# Patient Record
Sex: MALE | Race: WHITE | Employment: FULL TIME | URBAN - METROPOLITAN AREA
[De-identification: names, ages, dates, MRNs, and addresses within clinical notes are randomized per-mention and may not be internally consistent; named-entity substitution may affect disease eponyms.]

---

## 2019-08-19 ENCOUNTER — HOSPITAL ENCOUNTER (INPATIENT)
Facility: CLINIC | Age: 54
LOS: 3 days | Discharge: HOME | DRG: 392 | End: 2019-08-22
Attending: EMERGENCY MEDICINE | Admitting: INTERNAL MEDICINE
Payer: COMMERCIAL

## 2019-08-19 ENCOUNTER — APPOINTMENT (OUTPATIENT)
Dept: CT IMAGING | Facility: CLINIC | Age: 54
DRG: 392 | End: 2019-08-19
Attending: EMERGENCY MEDICINE
Payer: COMMERCIAL

## 2019-08-19 DIAGNOSIS — K57.20 PERFORATION OF SIGMOID COLON DUE TO DIVERTICULITIS: ICD-10-CM

## 2019-08-19 PROBLEM — K57.92 DIVERTICULITIS: Status: ACTIVE | Noted: 2019-08-19

## 2019-08-19 LAB
ALBUMIN SERPL-MCNC: 4.2 G/DL (ref 3.4–5)
ALBUMIN UR-MCNC: 10 MG/DL
ALP SERPL-CCNC: 90 U/L (ref 40–150)
ALT SERPL W P-5'-P-CCNC: 52 U/L (ref 0–70)
ANION GAP SERPL CALCULATED.3IONS-SCNC: 7 MMOL/L (ref 3–14)
APPEARANCE UR: CLEAR
AST SERPL W P-5'-P-CCNC: 28 U/L (ref 0–45)
BASOPHILS # BLD AUTO: 0 10E9/L (ref 0–0.2)
BASOPHILS NFR BLD AUTO: 0.2 %
BILIRUB DIRECT SERPL-MCNC: 0.2 MG/DL (ref 0–0.2)
BILIRUB SERPL-MCNC: 0.8 MG/DL (ref 0.2–1.3)
BILIRUB UR QL STRIP: NEGATIVE
BUN SERPL-MCNC: 14 MG/DL (ref 7–30)
CALCIUM SERPL-MCNC: 9.5 MG/DL (ref 8.5–10.1)
CHLORIDE SERPL-SCNC: 106 MMOL/L (ref 94–109)
CO2 SERPL-SCNC: 22 MMOL/L (ref 20–32)
COLOR UR AUTO: YELLOW
CREAT SERPL-MCNC: 0.93 MG/DL (ref 0.66–1.25)
DIFFERENTIAL METHOD BLD: ABNORMAL
EOSINOPHIL # BLD AUTO: 0.1 10E9/L (ref 0–0.7)
EOSINOPHIL NFR BLD AUTO: 0.4 %
ERYTHROCYTE [DISTWIDTH] IN BLOOD BY AUTOMATED COUNT: 12.5 % (ref 10–15)
GFR SERPL CREATININE-BSD FRML MDRD: >90 ML/MIN/{1.73_M2}
GLUCOSE SERPL-MCNC: 137 MG/DL (ref 70–99)
GLUCOSE UR STRIP-MCNC: NEGATIVE MG/DL
HCT VFR BLD AUTO: 45.5 % (ref 40–53)
HGB BLD-MCNC: 15.6 G/DL (ref 13.3–17.7)
HGB UR QL STRIP: NEGATIVE
IMM GRANULOCYTES # BLD: 0.1 10E9/L (ref 0–0.4)
IMM GRANULOCYTES NFR BLD: 0.7 %
KETONES UR STRIP-MCNC: NEGATIVE MG/DL
LACTATE BLD-SCNC: 1.2 MMOL/L (ref 0.7–2)
LEUKOCYTE ESTERASE UR QL STRIP: NEGATIVE
LYMPHOCYTES # BLD AUTO: 0.8 10E9/L (ref 0.8–5.3)
LYMPHOCYTES NFR BLD AUTO: 6.8 %
MAGNESIUM SERPL-MCNC: 1.7 MG/DL (ref 1.6–2.3)
MCH RBC QN AUTO: 30.4 PG (ref 26.5–33)
MCHC RBC AUTO-ENTMCNC: 34.3 G/DL (ref 31.5–36.5)
MCV RBC AUTO: 89 FL (ref 78–100)
MONOCYTES # BLD AUTO: 0.8 10E9/L (ref 0–1.3)
MONOCYTES NFR BLD AUTO: 7.1 %
MUCOUS THREADS #/AREA URNS LPF: PRESENT /LPF
NEUTROPHILS # BLD AUTO: 9.7 10E9/L (ref 1.6–8.3)
NEUTROPHILS NFR BLD AUTO: 84.8 %
NITRATE UR QL: NEGATIVE
NRBC # BLD AUTO: 0 10*3/UL
NRBC BLD AUTO-RTO: 0 /100
PH UR STRIP: 5.5 PH (ref 5–7)
PLATELET # BLD AUTO: 267 10E9/L (ref 150–450)
POTASSIUM SERPL-SCNC: 4.3 MMOL/L (ref 3.4–5.3)
PROT SERPL-MCNC: 7.9 G/DL (ref 6.8–8.8)
RBC # BLD AUTO: 5.13 10E12/L (ref 4.4–5.9)
RBC #/AREA URNS AUTO: 1 /HPF (ref 0–2)
SODIUM SERPL-SCNC: 135 MMOL/L (ref 133–144)
SOURCE: ABNORMAL
SP GR UR STRIP: 1.03 (ref 1–1.03)
UROBILINOGEN UR STRIP-MCNC: NORMAL MG/DL (ref 0–2)
WBC # BLD AUTO: 11.4 10E9/L (ref 4–11)
WBC #/AREA URNS AUTO: 1 /HPF (ref 0–5)

## 2019-08-19 PROCEDURE — 96365 THER/PROPH/DIAG IV INF INIT: CPT | Mod: 59

## 2019-08-19 PROCEDURE — 25000128 H RX IP 250 OP 636: Performed by: INTERNAL MEDICINE

## 2019-08-19 PROCEDURE — 25000128 H RX IP 250 OP 636: Performed by: EMERGENCY MEDICINE

## 2019-08-19 PROCEDURE — 83605 ASSAY OF LACTIC ACID: CPT | Performed by: INTERNAL MEDICINE

## 2019-08-19 PROCEDURE — 12000000 ZZH R&B MED SURG/OB

## 2019-08-19 PROCEDURE — 85025 COMPLETE CBC W/AUTO DIFF WBC: CPT | Performed by: EMERGENCY MEDICINE

## 2019-08-19 PROCEDURE — 80048 BASIC METABOLIC PNL TOTAL CA: CPT | Performed by: EMERGENCY MEDICINE

## 2019-08-19 PROCEDURE — 25000125 ZZHC RX 250: Performed by: EMERGENCY MEDICINE

## 2019-08-19 PROCEDURE — 96361 HYDRATE IV INFUSION ADD-ON: CPT

## 2019-08-19 PROCEDURE — 99223 1ST HOSP IP/OBS HIGH 75: CPT | Mod: AI | Performed by: INTERNAL MEDICINE

## 2019-08-19 PROCEDURE — 99221 1ST HOSP IP/OBS SF/LOW 40: CPT | Performed by: SURGERY

## 2019-08-19 PROCEDURE — 99285 EMERGENCY DEPT VISIT HI MDM: CPT | Mod: 25

## 2019-08-19 PROCEDURE — 83735 ASSAY OF MAGNESIUM: CPT | Performed by: EMERGENCY MEDICINE

## 2019-08-19 PROCEDURE — 96376 TX/PRO/DX INJ SAME DRUG ADON: CPT

## 2019-08-19 PROCEDURE — 96375 TX/PRO/DX INJ NEW DRUG ADDON: CPT

## 2019-08-19 PROCEDURE — 80076 HEPATIC FUNCTION PANEL: CPT | Performed by: EMERGENCY MEDICINE

## 2019-08-19 PROCEDURE — 74177 CT ABD & PELVIS W/CONTRAST: CPT

## 2019-08-19 PROCEDURE — 81001 URINALYSIS AUTO W/SCOPE: CPT | Performed by: EMERGENCY MEDICINE

## 2019-08-19 PROCEDURE — 25800025 ZZH RX 258: Performed by: INTERNAL MEDICINE

## 2019-08-19 PROCEDURE — 36415 COLL VENOUS BLD VENIPUNCTURE: CPT | Performed by: INTERNAL MEDICINE

## 2019-08-19 PROCEDURE — 25000132 ZZH RX MED GY IP 250 OP 250 PS 637: Performed by: INTERNAL MEDICINE

## 2019-08-19 RX ORDER — ONDANSETRON 2 MG/ML
4 INJECTION INTRAMUSCULAR; INTRAVENOUS
Status: COMPLETED | OUTPATIENT
Start: 2019-08-19 | End: 2019-08-19

## 2019-08-19 RX ORDER — HYDROMORPHONE HYDROCHLORIDE 1 MG/ML
.3-.5 INJECTION, SOLUTION INTRAMUSCULAR; INTRAVENOUS; SUBCUTANEOUS
Status: DISCONTINUED | OUTPATIENT
Start: 2019-08-19 | End: 2019-08-22 | Stop reason: HOSPADM

## 2019-08-19 RX ORDER — PANTOPRAZOLE SODIUM 20 MG/1
20 TABLET, DELAYED RELEASE ORAL
Status: DISCONTINUED | OUTPATIENT
Start: 2019-08-20 | End: 2019-08-22 | Stop reason: HOSPADM

## 2019-08-19 RX ORDER — PROCHLORPERAZINE MALEATE 10 MG
10 TABLET ORAL EVERY 6 HOURS PRN
Status: DISCONTINUED | OUTPATIENT
Start: 2019-08-19 | End: 2019-08-22 | Stop reason: HOSPADM

## 2019-08-19 RX ORDER — ONDANSETRON 4 MG/1
4 TABLET, ORALLY DISINTEGRATING ORAL EVERY 6 HOURS PRN
Status: DISCONTINUED | OUTPATIENT
Start: 2019-08-19 | End: 2019-08-22 | Stop reason: HOSPADM

## 2019-08-19 RX ORDER — ONDANSETRON 2 MG/ML
4 INJECTION INTRAMUSCULAR; INTRAVENOUS EVERY 6 HOURS PRN
Status: DISCONTINUED | OUTPATIENT
Start: 2019-08-19 | End: 2019-08-22 | Stop reason: HOSPADM

## 2019-08-19 RX ORDER — LIDOCAINE 40 MG/G
CREAM TOPICAL
Status: DISCONTINUED | OUTPATIENT
Start: 2019-08-19 | End: 2019-08-22 | Stop reason: HOSPADM

## 2019-08-19 RX ORDER — POTASSIUM CHLORIDE 1500 MG/1
20-40 TABLET, EXTENDED RELEASE ORAL
Status: DISCONTINUED | OUTPATIENT
Start: 2019-08-19 | End: 2019-08-22 | Stop reason: HOSPADM

## 2019-08-19 RX ORDER — ERTAPENEM 1 G/1
1 INJECTION, POWDER, LYOPHILIZED, FOR SOLUTION INTRAMUSCULAR; INTRAVENOUS ONCE
Status: COMPLETED | OUTPATIENT
Start: 2019-08-19 | End: 2019-08-19

## 2019-08-19 RX ORDER — IOPAMIDOL 755 MG/ML
500 INJECTION, SOLUTION INTRAVASCULAR ONCE
Status: COMPLETED | OUTPATIENT
Start: 2019-08-19 | End: 2019-08-19

## 2019-08-19 RX ORDER — DEXTROSE MONOHYDRATE, SODIUM CHLORIDE, AND POTASSIUM CHLORIDE 50; 1.49; 9 G/1000ML; G/1000ML; G/1000ML
INJECTION, SOLUTION INTRAVENOUS CONTINUOUS
Status: DISCONTINUED | OUTPATIENT
Start: 2019-08-19 | End: 2019-08-21

## 2019-08-19 RX ORDER — POTASSIUM CL/LIDO/0.9 % NACL 10MEQ/0.1L
10 INTRAVENOUS SOLUTION, PIGGYBACK (ML) INTRAVENOUS
Status: DISCONTINUED | OUTPATIENT
Start: 2019-08-19 | End: 2019-08-22 | Stop reason: HOSPADM

## 2019-08-19 RX ORDER — PROCHLORPERAZINE 25 MG
25 SUPPOSITORY, RECTAL RECTAL EVERY 12 HOURS PRN
Status: DISCONTINUED | OUTPATIENT
Start: 2019-08-19 | End: 2019-08-22 | Stop reason: HOSPADM

## 2019-08-19 RX ORDER — NALOXONE HYDROCHLORIDE 0.4 MG/ML
.1-.4 INJECTION, SOLUTION INTRAMUSCULAR; INTRAVENOUS; SUBCUTANEOUS
Status: DISCONTINUED | OUTPATIENT
Start: 2019-08-19 | End: 2019-08-22 | Stop reason: HOSPADM

## 2019-08-19 RX ORDER — POTASSIUM CHLORIDE 7.45 MG/ML
10 INJECTION INTRAVENOUS
Status: DISCONTINUED | OUTPATIENT
Start: 2019-08-19 | End: 2019-08-22 | Stop reason: HOSPADM

## 2019-08-19 RX ORDER — MAGNESIUM SULFATE HEPTAHYDRATE 40 MG/ML
4 INJECTION, SOLUTION INTRAVENOUS EVERY 4 HOURS PRN
Status: DISCONTINUED | OUTPATIENT
Start: 2019-08-19 | End: 2019-08-22 | Stop reason: HOSPADM

## 2019-08-19 RX ORDER — LORAZEPAM 1 MG/1
1 TABLET ORAL EVERY 6 HOURS PRN
COMMUNITY
End: 2020-01-02

## 2019-08-19 RX ORDER — POTASSIUM CHLORIDE 1.5 G/1.58G
20-40 POWDER, FOR SOLUTION ORAL
Status: DISCONTINUED | OUTPATIENT
Start: 2019-08-19 | End: 2019-08-22 | Stop reason: HOSPADM

## 2019-08-19 RX ORDER — LORAZEPAM 1 MG/1
1 TABLET ORAL EVERY 6 HOURS PRN
Status: DISCONTINUED | OUTPATIENT
Start: 2019-08-19 | End: 2019-08-22 | Stop reason: HOSPADM

## 2019-08-19 RX ORDER — OXYCODONE HYDROCHLORIDE 5 MG/1
5-10 TABLET ORAL EVERY 4 HOURS PRN
Status: DISCONTINUED | OUTPATIENT
Start: 2019-08-19 | End: 2019-08-22 | Stop reason: HOSPADM

## 2019-08-19 RX ORDER — ACETAMINOPHEN 325 MG/1
650 TABLET ORAL EVERY 4 HOURS PRN
Status: DISCONTINUED | OUTPATIENT
Start: 2019-08-19 | End: 2019-08-22 | Stop reason: HOSPADM

## 2019-08-19 RX ORDER — ACETAMINOPHEN 500 MG
1000 TABLET ORAL EVERY 6 HOURS PRN
COMMUNITY

## 2019-08-19 RX ORDER — MORPHINE SULFATE 4 MG/ML
4 INJECTION, SOLUTION INTRAMUSCULAR; INTRAVENOUS
Status: COMPLETED | OUTPATIENT
Start: 2019-08-19 | End: 2019-08-19

## 2019-08-19 RX ORDER — ERTAPENEM 1 G/1
1 INJECTION, POWDER, LYOPHILIZED, FOR SOLUTION INTRAMUSCULAR; INTRAVENOUS EVERY 24 HOURS
Status: DISCONTINUED | OUTPATIENT
Start: 2019-08-20 | End: 2019-08-19

## 2019-08-19 RX ORDER — POTASSIUM CHLORIDE 29.8 MG/ML
20 INJECTION INTRAVENOUS
Status: DISCONTINUED | OUTPATIENT
Start: 2019-08-19 | End: 2019-08-22 | Stop reason: HOSPADM

## 2019-08-19 RX ADMIN — HYDROMORPHONE HYDROCHLORIDE 0.5 MG: 1 INJECTION, SOLUTION INTRAMUSCULAR; INTRAVENOUS; SUBCUTANEOUS at 18:33

## 2019-08-19 RX ADMIN — ERTAPENEM SODIUM 1 G: 1 INJECTION, POWDER, LYOPHILIZED, FOR SOLUTION INTRAMUSCULAR; INTRAVENOUS at 10:51

## 2019-08-19 RX ADMIN — ACETAMINOPHEN 650 MG: 325 TABLET, FILM COATED ORAL at 19:58

## 2019-08-19 RX ADMIN — ONDANSETRON 4 MG: 2 INJECTION INTRAMUSCULAR; INTRAVENOUS at 08:04

## 2019-08-19 RX ADMIN — SODIUM CHLORIDE 1000 ML: 9 INJECTION, SOLUTION INTRAVENOUS at 08:02

## 2019-08-19 RX ADMIN — POTASSIUM CHLORIDE, DEXTROSE MONOHYDRATE AND SODIUM CHLORIDE: 150; 5; 900 INJECTION, SOLUTION INTRAVENOUS at 13:35

## 2019-08-19 RX ADMIN — MORPHINE SULFATE 4 MG: 4 INJECTION INTRAVENOUS at 09:49

## 2019-08-19 RX ADMIN — HYDROMORPHONE HYDROCHLORIDE 0.5 MG: 1 INJECTION, SOLUTION INTRAMUSCULAR; INTRAVENOUS; SUBCUTANEOUS at 15:36

## 2019-08-19 RX ADMIN — MORPHINE SULFATE 4 MG: 4 INJECTION INTRAVENOUS at 11:34

## 2019-08-19 RX ADMIN — RANITIDINE 150 MG: 150 TABLET ORAL at 21:11

## 2019-08-19 RX ADMIN — SODIUM CHLORIDE 61 ML: 9 INJECTION, SOLUTION INTRAVENOUS at 09:15

## 2019-08-19 RX ADMIN — MORPHINE SULFATE 4 MG: 4 INJECTION INTRAVENOUS at 08:08

## 2019-08-19 RX ADMIN — TAZOBACTAM SODIUM AND PIPERACILLIN SODIUM 3.38 G: 375; 3 INJECTION, SOLUTION INTRAVENOUS at 19:57

## 2019-08-19 RX ADMIN — HYDROMORPHONE HYDROCHLORIDE 0.5 MG: 1 INJECTION, SOLUTION INTRAMUSCULAR; INTRAVENOUS; SUBCUTANEOUS at 21:11

## 2019-08-19 RX ADMIN — HYDROMORPHONE HYDROCHLORIDE 0.3 MG: 1 INJECTION, SOLUTION INTRAMUSCULAR; INTRAVENOUS; SUBCUTANEOUS at 13:35

## 2019-08-19 RX ADMIN — IOPAMIDOL 84 ML: 755 INJECTION, SOLUTION INTRAVENOUS at 09:15

## 2019-08-19 ASSESSMENT — ACTIVITIES OF DAILY LIVING (ADL)
ADLS_ACUITY_SCORE: 11
ADLS_ACUITY_SCORE: 11

## 2019-08-19 ASSESSMENT — ENCOUNTER SYMPTOMS
ABDOMINAL PAIN: 1
DIAPHORESIS: 1
VOMITING: 1
CHILLS: 1

## 2019-08-19 ASSESSMENT — MIFFLIN-ST. JEOR
SCORE: 1553.63
SCORE: 1562.02

## 2019-08-19 NOTE — PHARMACY-ADMISSION MEDICATION HISTORY
Admission medication history interview status for this patient is complete. See [facility] admission navigator for allergy information, prior to admission medications and immunization status.     Medication history interview source(s):Patient  Medication history resources (including written lists, pill bottles, clinic record):None  Primary pharmacy:[facility] in [place]    Changes made to PTA medication list:  Added: all  Deleted: none  Changed: none    Actions taken by pharmacist (provider contacted, etc):None     Additional medication history information:  Note that patient has been taking SPIRAXIN from [place] 200 mg each tablet  Note that patient had Rx for Lexapro 20 mg qam and Lisinopril 10 mg daily written on 6-4-2019: patient has not taken either of these for over 1 month    Medication reconciliation/reorder completed by provider prior to medication history? No    Do you take OTC medications (eg tylenol, ibuprofen, fish oil, eye/ear drops, etc)? See list    For patients on insulin therapy:No    Prior to Admission medications    Medication Sig Last Dose Taking? Auth Provider   acetaminophen (TYLENOL) 500 MG tablet Take 1,000 mg by mouth every 6 hours as needed for mild pain 8/18/2019 at 2300 Yes Unknown, Entered By History   esomeprazole (NEXIUM) 20 MG DR capsule Take 20 mg by mouth every morning (before breakfast) Take 30-60 minutes before eating. 8/18/2019 at Unknown time Yes Unknown, Entered By History   LORazepam (ATIVAN) 1 MG tablet Take 1 mg by mouth every 6 hours as needed for anxiety 8/17/2019 at pm Yes Unknown, Entered By History   rifaximin (XIFAXAN) 200 MG tablet Take 200 mg by mouth 4 times daily BRAND is SPIRAXIN (medication for [place]) 8/18/2019 at 2300 Yes Unknown, Entered By History

## 2019-08-19 NOTE — ED TRIAGE NOTES
Pt has abdominal pain for past 5 days.  He reports hx of diverticulitis and is on Spiraxin a Rx from [place].  Also has Rifaximinia on the box.

## 2019-08-19 NOTE — PLAN OF CARE
Ambulatory Status:  Pt up standby.  Orientation: a/o  VS:  Tmax 99.0  Pain:  abd-IV dilaudid given   Resp: LS clear.   GI:  denies nausea.  NPO with ice chips diet. Faint/Hypo BS.  Denies passing flatus.  Last BM 8/19.  :  voiding without difficulty   Tx:  Zosyn   Consults:  Surgery  Disposition:  tbd   Pt admitted to the floor at 1200 from ER.

## 2019-08-19 NOTE — ED NOTES
[facility]  ED Nurse Handoff Report    Jim Mixon is a 54 year old male   ED Chief complaint: Abdominal Pain  . ED Diagnosis:   Final diagnoses:   Perforation of sigmoid colon due to diverticulitis     Allergies:   Allergies   Allergen Reactions     Ciprofloxacin        Code Status: Full Code  Activity level - Baseline/Home:  Independent. Activity Level - Current:   Independent. Lift room needed: No. Bariatric: No   Needed: Yes. Pt understands and speaks english well. Does need help with some medical terminology. Pt would like  for significant other  Isolation: No. Infection: Not Applicable.     Vital Signs:   Vitals:    08/19/19 0940 08/19/19 0949 08/19/19 0950 08/19/19 1000   BP:    128/89   Pulse:    100   Resp:  20  16   Temp:       TempSrc:       SpO2: 92%  94% 90%   Weight:       Height:           Cardiac Rhythm:  ,      Pain level: 0-10 Pain Scale: 1  Patient confused: No. Patient Falls Risk: No.   Elimination Status: Has voided   Patient Report - Initial Complaint: abd pain . Focused Assessment: left lower quad/pelvis pain  Tests Performed: labs, CT. Abnormal Results:   Labs Ordered and Resulted from Time of ED Arrival Up to the Time of Departure from the ED   CBC WITH PLATELETS DIFFERENTIAL - Abnormal; Notable for the following components:       Result Value    WBC 11.4 (*)     Absolute Neutrophil 9.7 (*)     All other components within normal limits   BASIC METABOLIC PANEL - Abnormal; Notable for the following components:    Glucose 137 (*)     All other components within normal limits   ROUTINE UA WITH MICROSCOPIC - Abnormal; Notable for the following components:    Protein Albumin Urine 10 (*)     Mucous Urine Present (*)     All other components within normal limits   PERIPHERAL IV CATHETER     CT Abdomen Pelvis w Contrast   Final Result   IMPRESSION: Extensive sigmoid diverticulitis. There is contained fluid   within the wall of the sigmoid as well as  immediately adjacent to the   bowel suspicious for abscess formation.       NARESH ANGUIANO MD         Treatments provided: morphine, invanz, ivf,   Family Comments: at the bedside.   OBS brochure/video discussed/provided to patient:  N/A  ED Medications:   Medications   morphine (PF) injection 4 mg (4 mg Intravenous Given 8/19/19 0949)   ertapenem (INVanz) 1 g vial to attach to  mL bag (1 g Intravenous New Bag 8/19/19 1051)   0.9% sodium chloride BOLUS (0 mLs Intravenous Stopped 8/19/19 0929)   ondansetron (ZOFRAN) injection 4 mg (4 mg Intravenous Given 8/19/19 0804)   CT Scan Flush (61 mLs Intravenous Given 8/19/19 0915)   iopamidol (ISOVUE-370) solution 500 mL (84 mLs Intravenous Given 8/19/19 0915)     Drips infusing:  No  For the majority of the shift, the patient's behavior Green. Interventions performed were axb, pain meds and ivf.     Severe Sepsis OR Septic Shock Diagnosis Present: No      ED Nurse Name/Phone Number: Karl LIV Marley,   11:00 AM    RECEIVING UNIT ED HANDOFF REVIEW    Above ED Nurse Handoff Report was reviewed: Yes  Reviewed by: Cesilia Nichole on August 19, 2019 at 11:37 AM

## 2019-08-19 NOTE — CONSULTS
"[facility]  [facility] - H&P     Jim Mixon MRN# [id number]   Age: 54 year old YOB: 1965     HPI:  Jim Mixon is a 54 year old male who has been experiencing acute RLQ and LLQ abdominal pain for the past 3-4 days associated with chills, nausea, vomiting, loose stools and anorexia.  These symptoms have been increasing in severity since Friday starting despite him having taken antibiotics (Spiraxin)  which he has with him from [place] for diverticulitis.      He was diagnosed with diverticulosis 5 years ago on a routine colonoscopy.  No polyps found, no family history of colon cancer.  Since that time, he has had a least two bouts of diverticulitis treated as an outpatient.  This is his first hospitalization for treatment and seems worse than his other bouts.     History is obtained from the patient and his wife.    Review Of Systems:  Respiratory: No shortness of breath, dyspnea on exertion, cough, or hemoptysis  Cardiovascular: negative  Gastrointestinal: as above  Genitourinary: negative    PMH:  Past Medical History:   Diagnosis Date     Anxiety      Diverticulitis      GERD (gastroesophageal reflux disease)      Hypertension        PSH:  Past Surgical History:   Procedure Laterality Date     CHOLECYSTECTOMY  01/2004       Allergies:  Allergies   Allergen Reactions     Ciprofloxacin        Home Medications:  No current outpatient medications on file.       Social History:  Social History     Tobacco Use     Smoking status: Not on file   Substance Use Topics     Alcohol use: Not on file     Drug use: Not on file       Family History:  No family history on file.    Objective:  /67 (BP Location: Right arm)   Pulse 95   Temp 99  F (37.2  C) (Oral)   Resp 16   Ht 1.702 m (5' 7\")   Wt 76.8 kg (169 lb 6.4 oz)   SpO2 98%   BMI 26.53 kg/m      General appearance: healthy, alert and mild distress.  Hydration: well hydrated  HEENT: normocephalic, " atraumatic  Neck: no adenopathy  Lungs: normal and no respiratory distress  Heart: regular rate and rhythm  Abdomen: rounded, protuberant and symmetric, hypoactive bowel sounds. Tenderness: present: LLQ moderate and involuntary guarding.  Masses: none.  Organomegaly: none  Rectal: deferred  Skin: clear without rashes/lesions  Extremities: no gross deformities  Neuro: oriented to time & place, moves all extremities with normal strength, speech clear    Labs Reviewed:  Recent Labs     08/19/19  0756   HGB 15.6   WBC 11.4*       Radiology:  All imaging studies reviewed by me.  CT scan of the abdomen:   Colon: Extensive sigmoid diverticulitis. There is contained fluid  within the wall of the sigmoid as well as immediately adjacent to the  bowel suspicious for abscess formation.   No free air.  CT scan interpreted by radiologist         ASSESSMENT/PLAN:  Jim has complicated diverticulitis with a possible early abscess.  There is nothing to drain at this time and no free air.  I would keep him NPO except ice chips and aggressively treat with antibiotics.  I think he should be considered for elective surgery once this episode resolves, to avoid the chance of a perforation and a two stage operation.  He is high risk for this due to the extent of his disease and his young age.      If he worsens, would repeat imaging to see if he has free air or evolving abscess which is amenable to drainage.  Will follow along with you.      Yi Jacinto MD

## 2019-08-19 NOTE — PLAN OF CARE
Ambulatory Status:  Pt up SBA.  Orientation: a/o  VS:  Tmax 101.1, HR tachy  Pain:  abd-IV dilaudid given   Resp: LS clear.   GI: No c/o nausea.  NPO with ice chips. Faint/Hypo BS. +passing flatus.  Last BM 8/19.  :  voiding without difficulty   Tx:  Zosyn   Consults:  Surgery  Disposition:  tbd   Pt admitted to the floor at 1200 from ER

## 2019-08-19 NOTE — ED PROVIDER NOTES
History     Chief Complaint:  Abdominal Pain    HPI   Jim Mixon is a 54 year old male with a history of diverticulitis and cholecystectomy who presents with abdominal pain. The patient describes how he developed left lower abdominal pain 4-5 days ago. The following day, he says he began to take his antibiotic Spiraxin (perscribed in [place]) and Tylenol, with improvement of symptoms. However, early this morning he began to feel intermittent abdominal pain again at 0100. This pain was accompanied by vomiting and cold sweats and increased until ED arrival. The patient denies other medication use beyond Nexium. Of note, the patient was diagnosed with diverticulitis in [place] five years ago and has previously had a flare up while in the [place] requiring ED treatment. A  was present for the benefit of the patient's wife.     Allergies:  Ciprofloxacin     Medications:    Ativan  Nexium  Lexapro  Lisinopril  Nexium  Spiraxin - day 3    Past Medical History:    Anxiety   Diverticulitis  GERD  Hypertension   Depression    Past Surgical History:    Cholecystectomy    Family History:    Father: diabetes    Social History:  The patient was accompanied to the ED by his wife.  Smoking Status: Never Smoker  Smokeless Tobacco: Never Used  Alcohol Use: Negative  PCP: Alex Cherry  Marital Status:        Review of Systems   Constitutional: Positive for chills (Cold sweats) and diaphoresis.   Gastrointestinal: Positive for abdominal pain and vomiting.   All other systems reviewed and are negative.    Physical Exam   First Vitals:  Patient Vitals for the past 24 hrs:   BP Temp Temp src Pulse Resp SpO2 Height Weight   08/19/19 1100  141/90 -- -- 105 -- 95 % -- --   08/19/19 1000 128/89 -- -- 100 16 90 % -- --   08/19/19 0950 -- -- -- -- -- 94 % -- --   08/19/19 0949 -- -- -- -- 20 -- -- --   08/19/19 0940 -- -- -- -- -- 92 % -- --   08/19/19 0930  146/88 -- -- 98 -- 96 % -- --  "  08/19/19 0920 -- -- -- -- -- 97 % -- --   08/19/19 0900  140/78 -- -- 95 -- 95 % -- --   08/19/19 0734  165/110 98  F (36.7  C) Temporal 103 20 96 % 1.702 m (5' 7\") 76 kg (167 lb 8.8 oz)     Physical Exam  Nursing note and vitals reviewed.  Constitutional: Cooperative. Uncomfortable appearing.   HENT:   Mouth/Throat: Mucous membranes are normal.   Cardiovascular: Normal rate, regular rhythm and normal heart sounds.  No murmur.  Pulmonary/Chest: Effort normal and breath sounds normal. No respiratory distress. No wheezes. No rales.   Abdominal: Soft. Normal appearance and bowel sounds are normal. No distension. Left lower quadrant tenderness.  Neurological: Alert. Oriented x4  Skin: Skin is warm and dry. .   Psychiatric: Normal mood and affect.     Emergency Department Course     Imaging:  Radiology findings were communicated with the patient who voiced understanding of the findings.    CT Abdomen Pelvis w Contrast  Extensive sigmoid diverticulitis. There is contained fluid within the wall of the sigmoid as well as immediately adjacent to the bowel suspicious for abscess formation.   Reading per radiology    Laboratory:  Laboratory findings were communicated with the patient who voiced understanding of the findings.    UA with Microscopic: Protein Albumin: 10 (!), Mucous: Present (!), o/w WNL    CBC: WBC 11.4 (H), HGB 15.6,   BMP: Glucose: 137 (H)  o/w WNL (Creatinine 0.93)    Interventions:  0802 NS, 1 L, IV  0808 Morphine 4 mg IV  0804 Zofran 4 mg IV  0949 Morphine 4 mg IV  1051 Invanz 1 g IV    Emergency Department Course:   Nursing notes and vitals reviewed.    0736 I performed an exam of the patient as documented above.     Medicine administered as documented above.     IV inserted. Blood drawn. This was sent to the lab for further testing, results above.     The patient provided a urine sample here in the emergency department. This was sent for laboratory testing, findings above.     The patient was " sent for a CT Abdomen Pelvis w Contrast while in the emergency department, findings above.     1002 I rechecked the patient and discussed the results of his workup thus far.     1028 I rechecked the patient, who noted his pain as improving to a 1/10 level    1046 I called Dr. Yi Jacinto, general surgery     1101 I called Dr. Roberto, hospitalist.     Prior to admission, I personally reviewed the lab and imaging results with the patient and answered all related questions. Patient was admitted to the care of Dr. Roberto.       Impression & Plan      Medical Decision Making:  Mr. Abelardo Mixon is a 54 year old gentleman with a history of diverticulitis on antibiotics but worsening pain. Unfortunately, CT scan shows fluid collection surrounding the colon, extensive diverticulitis, suspicious for microperforation with abscess. Pain has been well controlled. Will start him on IV antibiotics and admit him to the medical service with surgery consulting.     Diagnosis:    ICD-10-CM    1. Perforation of sigmoid colon due to diverticulitis K57.20        Disposition:  Admitted to the care of Dr. Roberto    Scribe Disclosure:  I, Tomas Garcia, am serving as a scribe on 8/19/2019 at 7:44 AM to personally document services performed by Donnie Sen MD based on my observations and the provider's statements to me.       Tomas Garcia  8/19/2019   [facility] EMERGENCY DEPARTMENT       Donnie Sen MD  08/19/19 1120

## 2019-08-19 NOTE — H&P
Admitted:     08/19/2019      CHIEF COMPLAINT:  Left lower quadrant abdominal pain.      HISTORY OF PRESENT ILLNESS:  Raoul Mixon is a 54-year-old gentleman, Japanese speaking, who understands English well with past medical history of diverticulitis who mostly treats himself with Spiraxin (Rifaximin) from [place] at home when he has symptoms.  This time, he took it for 3 days.  Despite that, abdominal pain got worse and patient came to the hospital.      The patient describes that pain developed about 4 days ago, he started his Spiraxin, which he usually takes for about 5 days with bouts of the symptoms, but this time it did not show improvement.  Last night, the pain got worse and this morning, he had cold sweats and also nauseated and  vomited once.  His last bowel movement was early this morning.  He denied any fever or chills. He denied diarrhea or constipation. Denied any hematochezia, no change in his weight or appetite.  The patient stated he had a colonoscopy about 5 years ago, at that time, he was diagnosed with diverticulosis.  He stated there was no polyp identified.  In the emergency room, he was evaluated. He has mild leukocytosis. CT scan showed microperforation with extensive sigmoid diverticulitis. He was given a dose of IV Invanz and admitted to the hospital.      PAST MEDICAL HISTORY:   1.  Diverticulitis.   2.  Anxiety.   3.  Gastroesophageal reflux disease.   4.  Hypertension.   5.  Diverticulosis.   6.  Depression.      PAST SURGICAL HISTORY:   1.  Cholecystectomy.   2.  Colonoscopy about 5 years ago.      FAMILY HISTORY:  Reviewed, significant for diabetes in his father, otherwise not significant.      SOCIAL HISTORY:  He is with his wife at bedside.  He never smoked.  He does not drink alcohol, does not use illicit drugs.  He is .  He moved here from [place] about 3 years ago.      REVIEW OF SYSTEMS:  Ten points reviewed, and all are negative except those mentioned in history  of present illness.      HOME MEDICATIONS:     Medications Prior to Admission   Medication Sig Dispense Refill Last Dose     acetaminophen (TYLENOL) 500 MG tablet Take 1,000 mg by mouth every 6 hours as needed for mild pain   8/18/2019 at 2300     esomeprazole (NEXIUM) 20 MG DR capsule Take 20 mg by mouth every morning (before breakfast) Take 30-60 minutes before eating.   8/18/2019 at Unknown time     LORazepam (ATIVAN) 1 MG tablet Take 1 mg by mouth every 6 hours as needed for anxiety   8/17/2019 at pm     rifaximin (XIFAXAN) 200 MG tablet Take 200 mg by mouth 4 times daily BRAND is SPIRAXIN (medication for [place])   8/18/2019 at 2300      PHYSICAL EXAMINATION:   GENERAL:  The patient is awake, alert, oriented, comfortable, pleasant.   VITAL SIGNS:  Blood pressure 115/67, pulse rate 90, temperature 99, oxygen saturation 98% on room air.   HEENT:  Pink, Unicteric.  Extraocular muscle movement intact.  Moist mucosa  NECK:  Supple, no JVD, no thyromegaly.   CHEST:  Good air entry bilaterally.  No wheezing, crackles or rales.   CVS:  S1 and S2 regular, no gallop or murmur.   ABDOMEN:  Protuberant, soft, positive bowel sounds.  Tenderness and guarding in the left lower quadrant, no voluntary guarding, no rebound.   EXTREMITIES:  No edema, cyanosis or clubbing.   NEUROLOGIC:  No focal neurologic deficit.  Cranial nerves II-XII grossly intact.   PSYCHIATRIC:  Normal mood and affect, keeps eye contact, responds to question appropriately.      DIAGNOSTIC TESTS OF INTEREST:  Electrolytes all normal.  Liver function tests added on, pending.  Glucose 137.  WBC 14.4, hemoglobin 15, platelets 267,000, neutrophils 48.8%.  Urinalysis is negative.      CT scan of the abdomen and pelvis with contrast showed extensive sigmoid diverticulitis with contained fluid within the wall of the sigmoid as well as immediately adjacent to the bowel, suspicious for abscess formation.      ASSESSMENT:  Jim Mixon is a 54-year-old  gentleman with past medical history significant for diverticulosis and diverticulitis, GERD, hypertension, who presented today with abdominal pain of about 4-5 days and worsening in the last 18 hours and was found to have sigmoid diverticulitis.  I discussed with the ED physician, Dr. Sen, regarding this patient who also called on-call General Surgery and discussed the case.   1.  Sigmoid diverticulitis with microperforation and possible small abscess.   2.  Hypertension.   3.  Anxiety.      PLAN:  The patient is being admitted as an inpatient.  I expect more than a 2 nights' stay in the hospital.  He was given a dose of ertapenem in the emergency room, will transition it to Zosyn starting this evening. We will monitor the patient.  Surgery was consulted, briefly discussed with Dr. Jacinto   We will continue most of his home medication including Ativan and lisinopril.  I will stop his prior Spiraxin at this point.  I discussed with the patient at length the plan of care.  All his questions and concerns were addressed.  His wife, also at bedside agreed with the plan of care.  He is Full Code.         ROSE VELEZ MD             D: 2019   T: 2019   MT: PAU      Name:     LEIGH COUGHLIN   MRN:      [id number]        Account:      [id number]   :      1965        Admitted:     2019                   Document: [id number]       cc: [facility]

## 2019-08-19 NOTE — LETTER
August 22, 2019    RE:  Jim Mixon                                                                                              [address] [address]    To whom it may concern:    Jim Mixon is under my professional care for recent hospitalization.    Please excuse from work for up to one week from today for continued recovery.      Sincerely,         Alisia Flores PA-C

## 2019-08-20 LAB
ANION GAP SERPL CALCULATED.3IONS-SCNC: 1 MMOL/L (ref 3–14)
BUN SERPL-MCNC: 12 MG/DL (ref 7–30)
CALCIUM SERPL-MCNC: 8.5 MG/DL (ref 8.5–10.1)
CHLORIDE SERPL-SCNC: 109 MMOL/L (ref 94–109)
CO2 SERPL-SCNC: 28 MMOL/L (ref 20–32)
CREAT SERPL-MCNC: 1.14 MG/DL (ref 0.66–1.25)
ERYTHROCYTE [DISTWIDTH] IN BLOOD BY AUTOMATED COUNT: 12.8 % (ref 10–15)
GFR SERPL CREATININE-BSD FRML MDRD: 72 ML/MIN/{1.73_M2}
GLUCOSE SERPL-MCNC: 132 MG/DL (ref 70–99)
HCT VFR BLD AUTO: 40 % (ref 40–53)
HGB BLD-MCNC: 12.9 G/DL (ref 13.3–17.7)
HGB BLD-MCNC: 13.4 G/DL (ref 13.3–17.7)
MCH RBC QN AUTO: 30.3 PG (ref 26.5–33)
MCHC RBC AUTO-ENTMCNC: 32.3 G/DL (ref 31.5–36.5)
MCV RBC AUTO: 94 FL (ref 78–100)
PLATELET # BLD AUTO: 232 10E9/L (ref 150–450)
POTASSIUM SERPL-SCNC: 4.5 MMOL/L (ref 3.4–5.3)
RBC # BLD AUTO: 4.26 10E12/L (ref 4.4–5.9)
SODIUM SERPL-SCNC: 138 MMOL/L (ref 133–144)
WBC # BLD AUTO: 12.7 10E9/L (ref 4–11)

## 2019-08-20 PROCEDURE — 25800025 ZZH RX 258: Performed by: INTERNAL MEDICINE

## 2019-08-20 PROCEDURE — 36415 COLL VENOUS BLD VENIPUNCTURE: CPT | Performed by: INTERNAL MEDICINE

## 2019-08-20 PROCEDURE — 85018 HEMOGLOBIN: CPT | Performed by: INTERNAL MEDICINE

## 2019-08-20 PROCEDURE — 25000128 H RX IP 250 OP 636: Performed by: INTERNAL MEDICINE

## 2019-08-20 PROCEDURE — 85027 COMPLETE CBC AUTOMATED: CPT | Performed by: INTERNAL MEDICINE

## 2019-08-20 PROCEDURE — 80048 BASIC METABOLIC PNL TOTAL CA: CPT | Performed by: INTERNAL MEDICINE

## 2019-08-20 PROCEDURE — 12000000 ZZH R&B MED SURG/OB

## 2019-08-20 PROCEDURE — 25000132 ZZH RX MED GY IP 250 OP 250 PS 637: Performed by: INTERNAL MEDICINE

## 2019-08-20 PROCEDURE — 99231 SBSQ HOSP IP/OBS SF/LOW 25: CPT | Performed by: SURGERY

## 2019-08-20 PROCEDURE — 99232 SBSQ HOSP IP/OBS MODERATE 35: CPT | Performed by: INTERNAL MEDICINE

## 2019-08-20 RX ORDER — BUTALBITAL/ASPIRIN/CAFFEINE 50-325-40
1 CAPSULE ORAL EVERY 4 HOURS PRN
Status: DISCONTINUED | OUTPATIENT
Start: 2019-08-20 | End: 2019-08-22 | Stop reason: HOSPADM

## 2019-08-20 RX ORDER — BUTALBITAL, ASPIRIN AND CAFFEINE 50; 325; 40 MG/1; MG/1; MG/1
1 TABLET ORAL EVERY 4 HOURS PRN
Status: DISCONTINUED | OUTPATIENT
Start: 2019-08-20 | End: 2019-08-20

## 2019-08-20 RX ADMIN — POTASSIUM CHLORIDE, DEXTROSE MONOHYDRATE AND SODIUM CHLORIDE: 150; 5; 900 INJECTION, SOLUTION INTRAVENOUS at 19:43

## 2019-08-20 RX ADMIN — ACETAMINOPHEN 650 MG: 325 TABLET, FILM COATED ORAL at 23:43

## 2019-08-20 RX ADMIN — TAZOBACTAM SODIUM AND PIPERACILLIN SODIUM 3.38 G: 375; 3 INJECTION, SOLUTION INTRAVENOUS at 13:53

## 2019-08-20 RX ADMIN — OXYCODONE HYDROCHLORIDE 10 MG: 5 TABLET ORAL at 08:00

## 2019-08-20 RX ADMIN — BUTALBITAL, ASPIRIN, AND CAFFEINE 1 CAPSULE: 50; 325; 40 CAPSULE ORAL at 18:05

## 2019-08-20 RX ADMIN — OXYCODONE HYDROCHLORIDE 5 MG: 5 TABLET ORAL at 03:40

## 2019-08-20 RX ADMIN — HYDROMORPHONE HYDROCHLORIDE 0.5 MG: 1 INJECTION, SOLUTION INTRAMUSCULAR; INTRAVENOUS; SUBCUTANEOUS at 10:43

## 2019-08-20 RX ADMIN — HYDROMORPHONE HYDROCHLORIDE 0.5 MG: 1 INJECTION, SOLUTION INTRAMUSCULAR; INTRAVENOUS; SUBCUTANEOUS at 00:06

## 2019-08-20 RX ADMIN — PANTOPRAZOLE SODIUM 20 MG: 20 TABLET, DELAYED RELEASE ORAL at 06:11

## 2019-08-20 RX ADMIN — HYDROMORPHONE HYDROCHLORIDE 0.5 MG: 1 INJECTION, SOLUTION INTRAMUSCULAR; INTRAVENOUS; SUBCUTANEOUS at 02:03

## 2019-08-20 RX ADMIN — TAZOBACTAM SODIUM AND PIPERACILLIN SODIUM 3.38 G: 375; 3 INJECTION, SOLUTION INTRAVENOUS at 08:02

## 2019-08-20 RX ADMIN — TAZOBACTAM SODIUM AND PIPERACILLIN SODIUM 3.38 G: 375; 3 INJECTION, SOLUTION INTRAVENOUS at 02:03

## 2019-08-20 RX ADMIN — ACETAMINOPHEN 650 MG: 325 TABLET, FILM COATED ORAL at 13:57

## 2019-08-20 RX ADMIN — RANITIDINE 150 MG: 150 TABLET ORAL at 19:43

## 2019-08-20 RX ADMIN — OXYCODONE HYDROCHLORIDE 10 MG: 5 TABLET ORAL at 13:56

## 2019-08-20 RX ADMIN — POTASSIUM CHLORIDE, DEXTROSE MONOHYDRATE AND SODIUM CHLORIDE: 150; 5; 900 INJECTION, SOLUTION INTRAVENOUS at 10:01

## 2019-08-20 RX ADMIN — TAZOBACTAM SODIUM AND PIPERACILLIN SODIUM 3.38 G: 375; 3 INJECTION, SOLUTION INTRAVENOUS at 19:45

## 2019-08-20 RX ADMIN — HYDROMORPHONE HYDROCHLORIDE 0.5 MG: 1 INJECTION, SOLUTION INTRAMUSCULAR; INTRAVENOUS; SUBCUTANEOUS at 06:14

## 2019-08-20 ASSESSMENT — ACTIVITIES OF DAILY LIVING (ADL)
ADLS_ACUITY_SCORE: 11

## 2019-08-20 NOTE — PROGRESS NOTES
"[facility]  General Surgery Progress Note           Assessment and Plan:   Assessment:   C/S for complicated diverticulitis with possible early abscess  Tmax 100.8      Plan:   -Continue NPO for now  -IV antibiotics: Zosyn  -GI prophylaxis: Protonix  -Pain control: IV dilaudid PRN, oxycodone PRN         Interval History:   Comfortable in bed, reports continued pain although severity of pain is improved from yesterday. Requesting Dilaudid about every 1.5 hours. Denies nausea/vomiting or bloating. +flatus, -BM. Up walking.        Physical Exam:   Blood pressure 120/60, pulse 87, temperature 99.4  F (37.4  C), temperature source Oral, resp. rate 16, height 1.702 m (5' 7\"), weight 76.8 kg (169 lb 6.4 oz), SpO2 95 %.    I/O last 3 completed shifts:  In: 1712 [P.O.:25; I.V.:1687]  Out: 500 [Urine:500]    Abdomen: soft, distended, TTP in LLQ, no masses palpated, +BS          Data:     Recent Labs   Lab 08/20/19  0704 08/19/19  0756   WBC 12.7* 11.4*   HGB 12.9* 15.6   HCT 40.0 45.5   MCV 94 89    267     Recent Labs   Lab 08/20/19  0704 08/19/19  0756    135   POTASSIUM 4.5 4.3   CHLORIDE 109 106   CO2 28 22   ANIONGAP 1* 7   * 137*   BUN 12 14   CR 1.14 0.93   GFRESTIMATED 72 >90   GFRESTBLACK 83 >90   JOESFINA 8.5 9.5   MAG  --  1.7   PROTTOTAL  --  7.9   ALBUMIN  --  4.2   BILITOTAL  --  0.8   ALKPHOS  --  90   AST  --  28   ALT  --  52       Rosy Rush PA-C     Clinically he looks excellent.  Still with pain (improved) and fevers.  Ileus still present.  NPO except ice for now.  Possible clear liquids tomorrow.  Needs iv antibiotics until afebrile and limited diet until ileus resolves.  Still recommending eventual elective sigmoidectomy once this episode is resolved.  Yi Jacinto MD    "

## 2019-08-20 NOTE — PLAN OF CARE
VSS, afebrile overnight. Left abdominal pain continuous- requesting prn dilaudid roughly every 1.5 hours, pain consistently 5/10. Receiving prn dilaudid and oxycodone. Passing gas. Up independently. Continues on IVF, zosyn.

## 2019-08-20 NOTE — PROGRESS NOTES
[facility]    Hospitalist Progress Note      Assessment & Plan   Jim Mixon is a 54 year old [place]orese-speaking gentleman who was admitted on 8/19/2019.  Past medical history significant for diverticulosis with diverticulitis, anxiety, GERD, hypertension, and depression.  Patient reports that he usually takes Spiraxin (rifaximin) from [facility] when he has symptoms of diverticulitis.  The patient reported that symptoms started 4 days prior to presentation he started taking his paroxetine.  Did not have any improvement, however, and ultimately presented to emergency department for further evaluation on 8/19.  Patient was afebrile on arrival, tachycardic, and hypertensive.  Lab evaluation notable for elevated white blood count.  Patient underwent CT abdomen pelvis with contrast which revealed fluid collection surrounding the colon, extensive diverticulitis, and suspicion for microperforation with abscess.  Patient was initially given ertapenem in the emergency department and subsequently switched to IV Zosyn.  Patient was admitted to hospitalist service for further evaluation and treatment.    Sigmoid diverticulitis with microperforation and possible small abscess  Appreciate surgery consultation.  Continue n.p.o.  Continue IV fluids at 100 mL/h.  PTA rifaximin on hold.  Continue antibiotics with IV Zosyn.  PRN pain medications available.  Note that hemoglobin decreased overnight, however suspected that this was spurious/due to dilution.  Repeat this afternoon up to 13.4 which is reasonable given the amount of IV fluid hydration.    History of hypertension  Blood pressures have been normotensive.  Patient is not on any medications PTA.  Recommend following with primary care.    Anxiety  Continue PTA PRN Ativan.    DVT Prophylaxis: Pneumatic Compression Devices  Code Status: Full Code  Expected discharge: Anticipate hospitalization at least 2-3 more days pending clinical improvement and advancement  of diet. Anticipate that patient will discharge to home.    Shalonda Landers MD FACP  Hospitalist Service  [facility]  Text Page (7am - 6pm)    Interval History   No acute events. Patient reports continued, but not worse, pain. Continues to request dilaudid. Patient was up to shower this morning and planning to ambulate in the halls. Denies nausea/vomiting or bowel movement. No new complaints.     -Data reviewed today: I reviewed all new labs and imaging results over the last 24 hours.       Physical Exam   Temp: 99.4  F (37.4  C) Temp src: Oral BP: 120/60 Pulse: 87 Heart Rate: 88 Resp: 16 SpO2: 95 % O2 Device: None (Room air)    Vitals:    08/19/19 0734 08/19/19 1206   Weight: 76 kg (167 lb 8.8 oz) 76.8 kg (169 lb 6.4 oz)     Vital Signs with Ranges  Temp:  [97.3  F (36.3  C)-101.1  F (38.4  C)] 99.4  F (37.4  C)  Pulse:  [] 87  Heart Rate:  [] 88  Resp:  [16-20] 16  BP: (105-146)/(60-90) 120/60  SpO2:  [85 %-98 %] 95 %  I/O last 3 completed shifts:  In: 1712 [P.O.:25; I.V.:1687]  Out: 500 [Urine:500]    Constitutional: Alert and oriented x3. No acute distress. Standing in room initially after showering. Subsequently sitting at side of bed with wife and Hungarian- at bedside. Non-toxic. Pleasant.   HEENT: NCAT. EOMI. Moist oral mucosa.  Respiratory: Clear to auscultation bilaterally. No crackles or wheezes.  Cardiovascular: Regular rate and rhythm. No murmur.  GI: Distended, but soft. Non-tender on exam. Hypoactive, but present, bowel sounds.  Musculoskeletal: No gross deformities. No peripheral edema.  Neurologic: Alert and oriented x3. No focal neurologic deficits. Normal gait.      Medications     dextrose 5% and 0.9% NaCl with potassium chloride 20 mEq 100 mL/hr at 08/20/19 0819       pantoprazole  20 mg Oral QAM AC     piperacillin-tazobactam  3.375 g Intravenous Q6H     ranitidine  150 mg Oral At Bedtime     sodium chloride (PF)  3 mL Intracatheter Q8H       Data   Recent  Labs   Lab 08/20/19  0704 08/19/19  0756   WBC 12.7* 11.4*   HGB 12.9* 15.6   MCV 94 89    267    135   POTASSIUM 4.5 4.3   CHLORIDE 109 106   CO2 28 22   BUN 12 14   CR 1.14 0.93   ANIONGAP 1* 7   JOSEFINA 8.5 9.5   * 137*   ALBUMIN  --  4.2   PROTTOTAL  --  7.9   BILITOTAL  --  0.8   ALKPHOS  --  90   ALT  --  52   AST  --  28       Recent Results (from the past 24 hour(s))   CT Abdomen Pelvis w Contrast    Narrative    CT ABDOMEN AND PELVIS WITH CONTRAST August 19, 2019 9:22 AM    HISTORY: Left lower quadrant abdominal pain.    COMPARISON: None.    TECHNIQUE: Routine transverse CT imaging of the abdomen and pelvis was  performed following the uneventful administration of 84mL Isovue-370  intravenous contrast. Radiation dose for this scan was reduced using  automated exposure control, adjustment of the mA and/or kV according  to patient size, or iterative reconstruction technique.    FINDINGS: The visualized lung bases are clear. There is mild diffuse  decreased density of the liver suggesting fatty infiltration. No focal  hepatic abnormality is seen. The spleen and pancreas are normal. There  has been a cholecystectomy. The adrenal glands are normal. There are a  few small cysts within both kidneys. No other urinary tract  abnormality is seen. No enlarged lymph node or other abnormal mass is  demonstrated. There are several diverticula throughout the sigmoid  colon. There is extensive thickening of the wall of the sigmoid colon  with mild inflammation in the adjacent mesenteric fat. There is a  small amount of fluid situated just posterior and superior to the  sigmoid. In addition, there is tracking of fluid along the wall of the  sigmoid colon posteriorly and inferiorly. No other gastrointestinal  tract abnormality is demonstrated. The appendix is not definitely  seen. There is no additional evidence of appendicitis. No vascular  abnormality is seen. The osseous structures are unremarkable.  No  abdominal or pelvic wall pathology is demonstrated.       Impression    IMPRESSION: Extensive sigmoid diverticulitis. There is contained fluid  within the wall of the sigmoid as well as immediately adjacent to the  bowel suspicious for abscess formation.     NARESH ANGUIANO MD

## 2019-08-21 LAB
ANION GAP SERPL CALCULATED.3IONS-SCNC: 4 MMOL/L (ref 3–14)
BUN SERPL-MCNC: 12 MG/DL (ref 7–30)
CALCIUM SERPL-MCNC: 8.7 MG/DL (ref 8.5–10.1)
CHLORIDE SERPL-SCNC: 110 MMOL/L (ref 94–109)
CO2 SERPL-SCNC: 23 MMOL/L (ref 20–32)
CREAT SERPL-MCNC: 0.96 MG/DL (ref 0.66–1.25)
ERYTHROCYTE [DISTWIDTH] IN BLOOD BY AUTOMATED COUNT: 12.5 % (ref 10–15)
GFR SERPL CREATININE-BSD FRML MDRD: 89 ML/MIN/{1.73_M2}
GLUCOSE SERPL-MCNC: 126 MG/DL (ref 70–99)
HCT VFR BLD AUTO: 39.7 % (ref 40–53)
HGB BLD-MCNC: 13.5 G/DL (ref 13.3–17.7)
MAGNESIUM SERPL-MCNC: 1.9 MG/DL (ref 1.6–2.3)
MCH RBC QN AUTO: 30.6 PG (ref 26.5–33)
MCHC RBC AUTO-ENTMCNC: 34 G/DL (ref 31.5–36.5)
MCV RBC AUTO: 90 FL (ref 78–100)
PLATELET # BLD AUTO: 128 10E9/L (ref 150–450)
POTASSIUM SERPL-SCNC: 4.4 MMOL/L (ref 3.4–5.3)
RBC # BLD AUTO: 4.41 10E12/L (ref 4.4–5.9)
SODIUM SERPL-SCNC: 137 MMOL/L (ref 133–144)
WBC # BLD AUTO: 13.2 10E9/L (ref 4–11)

## 2019-08-21 PROCEDURE — 99232 SBSQ HOSP IP/OBS MODERATE 35: CPT | Performed by: INTERNAL MEDICINE

## 2019-08-21 PROCEDURE — 25000128 H RX IP 250 OP 636: Performed by: INTERNAL MEDICINE

## 2019-08-21 PROCEDURE — 99231 SBSQ HOSP IP/OBS SF/LOW 25: CPT | Performed by: PHYSICIAN ASSISTANT

## 2019-08-21 PROCEDURE — 83735 ASSAY OF MAGNESIUM: CPT | Performed by: INTERNAL MEDICINE

## 2019-08-21 PROCEDURE — 12000000 ZZH R&B MED SURG/OB

## 2019-08-21 PROCEDURE — 25800030 ZZH RX IP 258 OP 636: Performed by: INTERNAL MEDICINE

## 2019-08-21 PROCEDURE — 25000125 ZZHC RX 250: Performed by: INTERNAL MEDICINE

## 2019-08-21 PROCEDURE — 85027 COMPLETE CBC AUTOMATED: CPT | Performed by: INTERNAL MEDICINE

## 2019-08-21 PROCEDURE — 25800025 ZZH RX 258: Performed by: INTERNAL MEDICINE

## 2019-08-21 PROCEDURE — 25000132 ZZH RX MED GY IP 250 OP 250 PS 637: Performed by: INTERNAL MEDICINE

## 2019-08-21 PROCEDURE — 36415 COLL VENOUS BLD VENIPUNCTURE: CPT | Performed by: INTERNAL MEDICINE

## 2019-08-21 PROCEDURE — 80048 BASIC METABOLIC PNL TOTAL CA: CPT | Performed by: INTERNAL MEDICINE

## 2019-08-21 RX ORDER — SODIUM CHLORIDE, SODIUM LACTATE, POTASSIUM CHLORIDE, CALCIUM CHLORIDE 600; 310; 30; 20 MG/100ML; MG/100ML; MG/100ML; MG/100ML
INJECTION, SOLUTION INTRAVENOUS CONTINUOUS
Status: DISCONTINUED | OUTPATIENT
Start: 2019-08-21 | End: 2019-08-22 | Stop reason: HOSPADM

## 2019-08-21 RX ADMIN — POTASSIUM CHLORIDE, DEXTROSE MONOHYDRATE AND SODIUM CHLORIDE: 150; 5; 900 INJECTION, SOLUTION INTRAVENOUS at 06:08

## 2019-08-21 RX ADMIN — TAZOBACTAM SODIUM AND PIPERACILLIN SODIUM 3.38 G: 375; 3 INJECTION, SOLUTION INTRAVENOUS at 16:04

## 2019-08-21 RX ADMIN — RANITIDINE 150 MG: 150 TABLET ORAL at 21:22

## 2019-08-21 RX ADMIN — TAZOBACTAM SODIUM AND PIPERACILLIN SODIUM 3.38 G: 375; 3 INJECTION, SOLUTION INTRAVENOUS at 09:38

## 2019-08-21 RX ADMIN — HYDROMORPHONE HYDROCHLORIDE 0.5 MG: 1 INJECTION, SOLUTION INTRAMUSCULAR; INTRAVENOUS; SUBCUTANEOUS at 12:33

## 2019-08-21 RX ADMIN — OXYCODONE HYDROCHLORIDE 10 MG: 5 TABLET ORAL at 04:03

## 2019-08-21 RX ADMIN — SODIUM CHLORIDE, POTASSIUM CHLORIDE, SODIUM LACTATE AND CALCIUM CHLORIDE: 600; 310; 30; 20 INJECTION, SOLUTION INTRAVENOUS at 12:15

## 2019-08-21 RX ADMIN — PANTOPRAZOLE SODIUM 20 MG: 20 TABLET, DELAYED RELEASE ORAL at 06:08

## 2019-08-21 RX ADMIN — Medication 2 G: at 12:17

## 2019-08-21 RX ADMIN — SODIUM CHLORIDE, POTASSIUM CHLORIDE, SODIUM LACTATE AND CALCIUM CHLORIDE: 600; 310; 30; 20 INJECTION, SOLUTION INTRAVENOUS at 22:51

## 2019-08-21 RX ADMIN — TAZOBACTAM SODIUM AND PIPERACILLIN SODIUM 3.38 G: 375; 3 INJECTION, SOLUTION INTRAVENOUS at 01:44

## 2019-08-21 RX ADMIN — ACETAMINOPHEN 650 MG: 325 TABLET, FILM COATED ORAL at 16:04

## 2019-08-21 RX ADMIN — HYDROMORPHONE HYDROCHLORIDE 0.5 MG: 1 INJECTION, SOLUTION INTRAMUSCULAR; INTRAVENOUS; SUBCUTANEOUS at 09:45

## 2019-08-21 RX ADMIN — OXYCODONE HYDROCHLORIDE 10 MG: 5 TABLET ORAL at 20:34

## 2019-08-21 RX ADMIN — TAZOBACTAM SODIUM AND PIPERACILLIN SODIUM 3.38 G: 375; 3 INJECTION, SOLUTION INTRAVENOUS at 22:00

## 2019-08-21 ASSESSMENT — ACTIVITIES OF DAILY LIVING (ADL)
ADLS_ACUITY_SCORE: 11

## 2019-08-21 NOTE — PLAN OF CARE
Pt up ind. VSS LS clear. BS hypo. Flatus+ BMx2. IVF infusing via PIV. LLQ pain, taking IV dilaudid. Tylenol for HA. Mag 1.9, replaced. Advanced to clear liquids, tolerated, then advanced to full liquids, denies nausea. Voiding adequate amount.

## 2019-08-21 NOTE — PROGRESS NOTES
"[facility]  General Surgery Progress Note         Assessment and Plan:   Assessment:   -C/S for complicated diverticulitis with possible early abscess  -Tmax 100.6      Plan:   -Ok for sips of clears  -Continue IV antibiotics: Zosyn  -GI prophylaxis: Protonix  -Pain control: IV dilaudid PRN, oxycodone PRN  -No surgical plans. Recommend eventual elective sigmoidectomy in the future.     Addendum  Pt much improved from admission, pain minimal. Passing flatus and had a BM  Tolerating clears today  Ok to advance to full liquids (ordered)  Discussed possible discharge tomorrow vs next day with 2 weeks of oral abx if continues to tolerate PO, is afebrile and wbc normalizing.  Discussed colonoscopy in 6 weeks and elective sigmoidectomy after that - my office will arrange  Jaimee Barrios MD  .        Interval History:   Comfortable in bed, pain is slowly improving. Getting oxycodone prn. Had some night sweats with fever around 11:30pm. He states he has been passing quite a bit of flatus and may have BM soon. He has been up walking the halls. Voiding independently. He requests to drink something sweet.         Physical Exam:   Blood pressure 137/84, pulse 91, temperature 96.8  F (36  C), temperature source Oral, resp. rate 20, height 1.702 m (5' 7\"), weight 76.8 kg (169 lb 6.4 oz), SpO2 95 %.    I/O last 3 completed shifts:  In: 1591 [P.O.:145; I.V.:1446]  Out: 100 [Urine:100]    Abdomen: soft, +distended, TTP in LLQ, +Hyperactive BS          Data:     Recent Labs   Lab 08/21/19  0727 08/20/19  1442 08/20/19  0704 08/19/19  0756   WBC 13.2*  --  12.7* 11.4*   HGB 13.5 13.4 12.9* 15.6   HCT 39.7*  --  40.0 45.5   MCV 90  --  94 89   *  --  232 267     Recent Labs   Lab 08/21/19  0727 08/20/19  0704 08/19/19  0756    138 135   POTASSIUM 4.4 4.5 4.3   CHLORIDE 110* 109 106   CO2 23 28 22   ANIONGAP 4 1* 7   * 132* 137*   BUN 12 12 14   CR 0.96 1.14 0.93   GFRESTIMATED 89 72 >90   GFRESTBLACK " >90 83 >90   JOSEFINA 8.7 8.5 9.5   MAG  --   --  1.7   PROTTOTAL  --   --  7.9   ALBUMIN  --   --  4.2   BILITOTAL  --   --  0.8   ALKPHOS  --   --  90   AST  --   --  28   ALT  --   --  52       Atul Merchant PA-C

## 2019-08-21 NOTE — PLAN OF CARE
Ambulatory Status:  Pt up Ind  Orientation: a/o  VS: VSS  Pain:  HA- Fiorinol given x1  Resp: LS clear.   GI: No c/o nausea.  NPO with ice chips. Active BS. +passing flatus.  Last BM 8/19.  :  voiding without difficulty   Tx:  Zosyn   Consults:  Surgery  Disposition:  tbd   WBC 12.7

## 2019-08-21 NOTE — PROGRESS NOTES
[facility]    Hospitalist Progress Note      Assessment & Plan   Jim Mixon is a 54 year old Sami-speaking gentleman who was admitted on 8/19/2019.  Past medical history significant for diverticulosis with diverticulitis, anxiety, GERD, hypertension, and depression.  Patient reports that he usually takes Spiraxin (rifaximin) from [facility] when he has symptoms of diverticulitis.  The patient reported that symptoms started 4 days prior to presentation he started taking his paroxetine.  Did not have any improvement, however, and ultimately presented to emergency department for further evaluation on 8/19.  Patient was afebrile on arrival, tachycardic, and hypertensive.  Lab evaluation notable for elevated white blood count.  Patient underwent CT abdomen pelvis with contrast which revealed fluid collection surrounding the colon, extensive diverticulitis, and suspicion for microperforation with abscess.  Patient was initially given ertapenem in the emergency department and subsequently switched to IV Zosyn.  Patient was admitted to hospitalist service for further evaluation and treatment.    Sigmoid diverticulitis with microperforation and possible small abscess  Appreciate surgery consultation.  Surgery has advanced to sips of clears.   Continue IV fluids at 100 mL/h, though changing to LR.  PTA rifaximin on hold.  Continue IV Zosyn.  PRN pain medications available.    History of hypertension  Blood pressures have been normotensive.  Patient is not on any medications PTA.  Recommend following with primary care.    Anxiety  Continue PTA PRN Ativan.    DVT Prophylaxis: Pneumatic Compression Devices  Code Status: Full Code  Expected discharge: Anticipate hospitalization at least 1-2 more days pending clinical improvement and advancement of diet. Anticipate that patient will discharge to home.    Shalonda Landers MD FACP  Hospitalist Service  [facility]  Text Page (7am -  6pm)    Interval History   No acute events.  Patient has had a bowel movement.  Denies any blood in bowel movement.  Did report increased pain overnight.  Temperature 100.6 overnight and white count slightly increased.  Surgery has advanced to sips of clears.  Patient without any new complaints and planning to continue incentive spirometry and ambulate in boo today.    -Data reviewed today: I reviewed all new labs and imaging results over the last 24 hours.       Physical Exam   Temp: 100.6  F (38.1  C) Temp src: Oral BP: 117/59 Pulse: 91 Heart Rate: 94 Resp: 20 SpO2: 94 % O2 Device: None (Room air)    Vitals:    08/19/19 0734 08/19/19 1206   Weight: 76 kg (167 lb 8.8 oz) 76.8 kg (169 lb 6.4 oz)     Vital Signs with Ranges  Temp:  [97.8  F (36.6  C)-100.6  F (38.1  C)] 100.6  F (38.1  C)  Pulse:  [91] 91  Heart Rate:  [93-94] 94  Resp:  [18-20] 20  BP: (117-131)/(59-82) 117/59  SpO2:  [94 %-97 %] 94 %  I/O last 3 completed shifts:  In: 1591 [P.O.:145; I.V.:1446]  Out: 100 [Urine:100]    Constitutional: Alert and oriented x3. No acute distress.  Sitting up in bed with wife at bedside.   in room.  Patient is nontoxic and pleasant.    HEENT: NCAT. EOMI. Moist oral mucosa.  Respiratory: Clear to auscultation bilaterally. No crackles or wheezes.  Cardiovascular: Regular rate and rhythm. No murmur.  GI: Continues to be distended, but soft.  Nontender on exam.  Normoactive bowel sounds appreciated.  Musculoskeletal: No gross deformities. No peripheral edema.  Neurologic: Alert and oriented x3. No focal neurologic deficits.       Medications     lactated ringers         pantoprazole  20 mg Oral QAM AC     piperacillin-tazobactam  3.375 g Intravenous Q6H     ranitidine  150 mg Oral At Bedtime     sodium chloride (PF)  3 mL Intracatheter Q8H       Data   Recent Labs   Lab 08/20/19  1442 08/20/19  0704 08/19/19  0756   WBC  --  12.7* 11.4*   HGB 13.4 12.9* 15.6   MCV  --  94 89   PLT  --  232 267   NA  --   138 135   POTASSIUM  --  4.5 4.3   CHLORIDE  --  109 106   CO2  --  28 22   BUN  --  12 14   CR  --  1.14 0.93   ANIONGAP  --  1* 7   JOSEFINA  --  8.5 9.5   GLC  --  132* 137*   ALBUMIN  --   --  4.2   PROTTOTAL  --   --  7.9   BILITOTAL  --   --  0.8   ALKPHOS  --   --  90   ALT  --   --  52   AST  --   --  28       No results found for this or any previous visit (from the past 24 hour(s)).

## 2019-08-22 VITALS
SYSTOLIC BLOOD PRESSURE: 134 MMHG | OXYGEN SATURATION: 98 % | HEIGHT: 67 IN | RESPIRATION RATE: 18 BRPM | WEIGHT: 169.4 LBS | TEMPERATURE: 97.5 F | HEART RATE: 78 BPM | BODY MASS INDEX: 26.59 KG/M2 | DIASTOLIC BLOOD PRESSURE: 85 MMHG

## 2019-08-22 LAB
BASOPHILS # BLD AUTO: 0 10E9/L (ref 0–0.2)
BASOPHILS NFR BLD AUTO: 0.4 %
DIFFERENTIAL METHOD BLD: NORMAL
EOSINOPHIL # BLD AUTO: 0.3 10E9/L (ref 0–0.7)
EOSINOPHIL NFR BLD AUTO: 2.7 %
ERYTHROCYTE [DISTWIDTH] IN BLOOD BY AUTOMATED COUNT: 12.1 % (ref 10–15)
HCT VFR BLD AUTO: 40.3 % (ref 40–53)
HGB BLD-MCNC: 13.3 G/DL (ref 13.3–17.7)
IMM GRANULOCYTES # BLD: 0.2 10E9/L (ref 0–0.4)
IMM GRANULOCYTES NFR BLD: 1.8 %
LYMPHOCYTES # BLD AUTO: 1.7 10E9/L (ref 0.8–5.3)
LYMPHOCYTES NFR BLD AUTO: 15.9 %
MCH RBC QN AUTO: 29.9 PG (ref 26.5–33)
MCHC RBC AUTO-ENTMCNC: 33 G/DL (ref 31.5–36.5)
MCV RBC AUTO: 91 FL (ref 78–100)
MONOCYTES # BLD AUTO: 0.9 10E9/L (ref 0–1.3)
MONOCYTES NFR BLD AUTO: 8.6 %
NEUTROPHILS # BLD AUTO: 7.4 10E9/L (ref 1.6–8.3)
NEUTROPHILS NFR BLD AUTO: 70.6 %
NRBC # BLD AUTO: 0 10*3/UL
NRBC BLD AUTO-RTO: 0 /100
PLATELET # BLD AUTO: 311 10E9/L (ref 150–450)
RBC # BLD AUTO: 4.45 10E12/L (ref 4.4–5.9)
WBC # BLD AUTO: 10.5 10E9/L (ref 4–11)

## 2019-08-22 PROCEDURE — 25000132 ZZH RX MED GY IP 250 OP 250 PS 637: Performed by: INTERNAL MEDICINE

## 2019-08-22 PROCEDURE — 36415 COLL VENOUS BLD VENIPUNCTURE: CPT | Performed by: INTERNAL MEDICINE

## 2019-08-22 PROCEDURE — 99232 SBSQ HOSP IP/OBS MODERATE 35: CPT | Performed by: PHYSICIAN ASSISTANT

## 2019-08-22 PROCEDURE — 99239 HOSP IP/OBS DSCHRG MGMT >30: CPT | Performed by: INTERNAL MEDICINE

## 2019-08-22 PROCEDURE — 85025 COMPLETE CBC W/AUTO DIFF WBC: CPT | Performed by: INTERNAL MEDICINE

## 2019-08-22 PROCEDURE — 25000128 H RX IP 250 OP 636: Performed by: INTERNAL MEDICINE

## 2019-08-22 PROCEDURE — 25000125 ZZHC RX 250: Performed by: INTERNAL MEDICINE

## 2019-08-22 RX ORDER — OXYCODONE HYDROCHLORIDE 5 MG/1
5-10 TABLET ORAL EVERY 6 HOURS PRN
Qty: 6 TABLET | Refills: 0 | Status: ON HOLD | OUTPATIENT
Start: 2019-08-22 | End: 2019-09-04

## 2019-08-22 RX ADMIN — TAZOBACTAM SODIUM AND PIPERACILLIN SODIUM 3.38 G: 375; 3 INJECTION, SOLUTION INTRAVENOUS at 04:03

## 2019-08-22 RX ADMIN — TAZOBACTAM SODIUM AND PIPERACILLIN SODIUM 3.38 G: 375; 3 INJECTION, SOLUTION INTRAVENOUS at 09:04

## 2019-08-22 RX ADMIN — OXYCODONE HYDROCHLORIDE 10 MG: 5 TABLET ORAL at 16:01

## 2019-08-22 RX ADMIN — OXYCODONE HYDROCHLORIDE 10 MG: 5 TABLET ORAL at 09:04

## 2019-08-22 RX ADMIN — Medication 2 G: at 09:41

## 2019-08-22 RX ADMIN — OXYCODONE HYDROCHLORIDE 10 MG: 5 TABLET ORAL at 02:21

## 2019-08-22 RX ADMIN — AMOXICILLIN AND CLAVULANATE POTASSIUM 1 TABLET: 875; 125 TABLET, FILM COATED ORAL at 13:03

## 2019-08-22 RX ADMIN — PANTOPRAZOLE SODIUM 20 MG: 20 TABLET, DELAYED RELEASE ORAL at 06:54

## 2019-08-22 ASSESSMENT — ACTIVITIES OF DAILY LIVING (ADL)
ADLS_ACUITY_SCORE: 11

## 2019-08-22 NOTE — PROGRESS NOTES
Pt d/c'd home. discharge instructions given & verbalized understanding. discharge meds sent with pt.

## 2019-08-22 NOTE — PLAN OF CARE
Pt VSS. Reports pain, oxy given x2.  Denies N/V.  LS CTA bilat, on RA.  BS active, +ve flatus. LBM 8/21.  Reports voiding w/no difficulty.  Up independently.  Full liquid diet, tolerating.  LR infusing @ 100/hr.  Tx: Zosyn.  Consults: Surgery.  Discharge: TBD.

## 2019-08-22 NOTE — DISCHARGE SUMMARY
[facility]    Discharge Summary  Hospitalist    Date of Admission:  8/19/2019  Date of Discharge:  8/22/2019  Discharging Provider: Shalonda Landers MD  Date of Service (when I saw the patient): 08/22/19    Discharge Diagnoses   Sigmoid diverticulitis with microperforation and possible small abscess  History of hypertension  Anxiety    History of Present Illness   Jim Mixon is a 54 year old Ugandan-speaking gentleman who was admitted on 8/19/2019.  Past medical history significant for diverticulosis with diverticulitis, anxiety, GERD, hypertension, and depression.  Patient reports that he usually takes Spiraxin (rifaximin) from [facility] when he has symptoms of diverticulitis.  The patient reported that symptoms started 4 days prior to presentation he started taking his paroxetine.  Did not have any improvement, however, and ultimately presented to emergency department for further evaluation on 8/19.  Patient was afebrile on arrival, tachycardic, and hypertensive.  Lab evaluation notable for elevated white blood count.  Patient underwent CT abdomen pelvis with contrast which revealed fluid collection surrounding the colon, extensive diverticulitis, and suspicion for microperforation with abscess.  Patient was initially given ertapenem in the emergency department and subsequently switched to IV Zosyn.  Patient was admitted to hospitalist service for further evaluation and treatment.     Hospital Course   Jim Mixon was admitted on 8/19/2019.  The following problems were addressed during his hospitalization:    Sigmoid diverticulitis with microperforation and possible small abscess  Appreciate surgery consultation.  Diet has been advanced to soft diet today by surgery.   PTA rifaximin on hold.  Patient has been on IV Zosyn during hospitalization.  Has been changed to p.o. Augmentin at this time and will discharge with an additional 14 days per surgery team.  Patient tolerating  diet with no nausea or increasing pain.  Surgery team has provided patient with oxycodone 5 mg, #6 tabs.  Patient is to follow with Dr. Barrios in 2 weeks to discuss/plan outpatient colonoscopy in 6 weeks and possible future surgery.     History of hypertension  Blood pressures have been normotensive.  Patient is not on any medications PTA.  Recommend following with primary care.     Anxiety  Continue PTA PRN Ativan.    Shalonda Landers MD FACP  Hospitalist Service  [facility]    Significant Results and Procedures   None    Pending Results   None    Code Status   Full Code       Primary Care Physician   [facility]    Physical Exam   Temp: 100  F (37.8  C) Temp src: Oral BP: 134/85 Pulse: 78 Heart Rate: 78 Resp: 18 SpO2: 98 % O2 Device: None (Room air)    Vitals:    08/19/19 0734 08/19/19 1206   Weight: 76 kg (167 lb 8.8 oz) 76.8 kg (169 lb 6.4 oz)     Vital Signs with Ranges  Temp:  [98  F (36.7  C)-100  F (37.8  C)] 100  F (37.8  C)  Pulse:  [78-80] 78  Heart Rate:  [78-91] 78  Resp:  [18-20] 18  BP: (121-143)/(75-85) 134/85  SpO2:  [95 %-98 %] 98 %  I/O last 3 completed shifts:  In: 1972 [P.O.:200; I.V.:1772]  Out: -     Constitutional: Alert and oriented x3.  Resting in bed with Dr. Barrios, wife, and  at bedside.  Patient is nontoxic.  Pleasant.  No acute distress.    HEENT: NCAT. EOMI. Moist oral mucosa.  Respiratory: Clear to auscultation bilaterally. No crackles or wheezes.  Cardiovascular: Regular rate and rhythm. No murmur.  GI: Slightly distended, but soft.  Nontender.  Normoactive bowel sounds.    Musculoskeletal: No gross deformities.  No peripheral edema.  Neurologic: Alert and oriented x3. No focal neurologic deficits.     Discharge Disposition   Discharged to home  Condition at discharge: Stable    Consultations This Hospital Stay   SURGERY GENERAL IP CONSULT    Time Spent on this Encounter   I, Shalonda Landers MD, personally saw the patient today and  spent greater than 30 minutes discharging this patient.    Discharge Orders      Reason for your hospital stay    Complicated sigmoid diverticulitis with possible early abscess     Activity    Your activity upon discharge: activity as tolerated     Follow-up and recommended labs and tests     Follow up with primary care provider, Alex Cherry, within 7 days for hospital follow- up.  The following labs/tests are recommended: CBC, BMP.    Follow up with Dr. Jaimee Barrios in 2 weeks for appointment and to discuss/plan outpatient colonoscopy in 6 weeks and possible future surgery. Call [phone] to schedule this.     Full Code     Diet    Follow this diet upon discharge: Soft Diet as recommended by Dr. Barrios.     Discharge Medications   Current Discharge Medication List      START taking these medications    Details   amoxicillin-clavulanate (AUGMENTIN) 875-125 MG tablet Take 1 tablet by mouth 2 times daily for 14 days  Qty: 28 tablet, Refills: 0    Associated Diagnoses: Perforation of sigmoid colon due to diverticulitis      oxyCODONE (ROXICODONE) 5 MG tablet Take 1-2 tablets (5-10 mg) by mouth every 6 hours as needed for severe pain Take with food to minimize side effects.  Qty: 6 tablet, Refills: 0    Associated Diagnoses: Perforation of sigmoid colon due to diverticulitis         CONTINUE these medications which have NOT CHANGED    Details   acetaminophen (TYLENOL) 500 MG tablet Take 1,000 mg by mouth every 6 hours as needed for mild pain      esomeprazole (NEXIUM) 20 MG DR capsule Take 20 mg by mouth every morning (before breakfast) Take 30-60 minutes before eating.      LORazepam (ATIVAN) 1 MG tablet Take 1 mg by mouth every 6 hours as needed for anxiety         STOP taking these medications       rifaximin (XIFAXAN) 200 MG tablet Comments:   Reason for Stopping:             Allergies   Allergies   Allergen Reactions     Ciprofloxacin      Data   Most Recent 3 CBC's:  Recent Labs   Lab Test  08/22/19  0710 08/21/19  0727 08/20/19  1442 08/20/19  0704   WBC 10.5 13.2*  --  12.7*   HGB 13.3 13.5 13.4 12.9*   MCV 91 90  --  94    128*  --  232      Most Recent 3 BMP's:  Recent Labs   Lab Test 08/21/19  0727 08/20/19  0704 08/19/19  0756    138 135   POTASSIUM 4.4 4.5 4.3   CHLORIDE 110* 109 106   CO2 23 28 22   BUN 12 12 14   CR 0.96 1.14 0.93   ANIONGAP 4 1* 7   JOSEFINA 8.7 8.5 9.5   * 132* 137*     Most Recent 2 LFT's:  Recent Labs   Lab Test 08/19/19  0756   AST 28   ALT 52   ALKPHOS 90   BILITOTAL 0.8     Most Recent INR's and Anticoagulation Dosing History:  Anticoagulation Dose History     There is no flowsheet data to display.        Most Recent 3 Troponin's:No lab results found.  Most Recent Cholesterol Panel:No lab results found.  Most Recent 6 Bacteria Isolates From Any Culture (See EPIC Reports for Culture Details):No lab results found.  Most Recent TSH, T4 and A1c Labs:No lab results found.  Results for orders placed or performed during the hospital encounter of 08/19/19   CT Abdomen Pelvis w Contrast    Narrative    CT ABDOMEN AND PELVIS WITH CONTRAST August 19, 2019 9:22 AM    HISTORY: Left lower quadrant abdominal pain.    COMPARISON: None.    TECHNIQUE: Routine transverse CT imaging of the abdomen and pelvis was  performed following the uneventful administration of 84mL Isovue-370  intravenous contrast. Radiation dose for this scan was reduced using  automated exposure control, adjustment of the mA and/or kV according  to patient size, or iterative reconstruction technique.    FINDINGS: The visualized lung bases are clear. There is mild diffuse  decreased density of the liver suggesting fatty infiltration. No focal  hepatic abnormality is seen. The spleen and pancreas are normal. There  has been a cholecystectomy. The adrenal glands are normal. There are a  few small cysts within both kidneys. No other urinary tract  abnormality is seen. No enlarged lymph node or other  abnormal mass is  demonstrated. There are several diverticula throughout the sigmoid  colon. There is extensive thickening of the wall of the sigmoid colon  with mild inflammation in the adjacent mesenteric fat. There is a  small amount of fluid situated just posterior and superior to the  sigmoid. In addition, there is tracking of fluid along the wall of the  sigmoid colon posteriorly and inferiorly. No other gastrointestinal  tract abnormality is demonstrated. The appendix is not definitely  seen. There is no additional evidence of appendicitis. No vascular  abnormality is seen. The osseous structures are unremarkable. No  abdominal or pelvic wall pathology is demonstrated.       Impression    IMPRESSION: Extensive sigmoid diverticulitis. There is contained fluid  within the wall of the sigmoid as well as immediately adjacent to the  bowel suspicious for abscess formation.     NARESH ANGUIANO MD

## 2019-08-22 NOTE — DISCHARGE INSTRUCTIONS
"HOME CARE FOLLOWING DIVERTICULITIS ADMISSION  PARRIS Guzman, MAJOR Collins R. O Donnell, SCARLETT Barrios     RETURN APPOINTMENT:  Schedule a follow-up visit with Dr Barrios 2 weeks after discharge from the hospital.  Office Phone:  [phone]    BATHING:  If you had a drain in place at any time, avoid baths until 1 week after drain removal.  Showers are okay any time after the drain is out.  You may wash your hair at any time.  Gently pat your incision dry after bathing before replacing a dressing.    ACTIVITY:  Light Activity -- you may immediately be up and about as tolerated.  Driving -- you may drive when comfortable and off narcotic pain medications.  Light Work -- resume when comfortable off pain medications.  (If you can drive, you probably can work.)  Strenuous Work/Activity -- limit lifting to 15 pounds for 1-2 weeks.  Then, progressively increase with time.  Active Sports (running, biking, etc.) -- cautiously resume after 4-6 weeks, or when cleared by your surgeon.    DISCOMFORT:  Use pain medications as prescribed by your surgeon.  Take the pain medication with some food, when possible, to minimize side effects.  Expect gradual improvement.    ANTIBIOTIC THERAPY:  Finish the entire course of antibiotics which have been prescribed.  Contact your surgeon's office if you finish your course of antibiotics and are still feeling any residual abdominal pain or signs of your infection.    DIET:  Continue on a \"soft\" diet (i.e. cooked vegetables, soups, processed meats, light/white bread, mashed potatoes, rice, yogurt) for 3-5 days after discharge from the hospital.  After this time, you may return to diet you were on before surgery.  In general, and specifically while taking pain medications, increase dietary fiber or add a fiber supplementation like Metamucil or Citrucel to help prevent constipation (this is also a possible side effect of pain medications).  Drink plenty of " fluids.     CONTACT US IF THE FOLLOWING DEVELOPS:   1. A fever that is above 101     2. If there is a large amount of drainage, bleeding, or swelling.   3. Severe pain that is not relieved by your prescription.   4. Drainage that is thick, cloudy, yellow, green or white.   5. Any other questions not answered by  Frequently Asked Questions  sheet.      FREQUENTLY ASKED QUESTIONS:    Q:  What can I do to minimize constipation (very hard stools, or lack of stools)?  A:  Stay well hydrated.  Increase your dietary fiber intake or take a fiber supplement -with plenty of water.  Walk around frequently.  You may consider an over-the-counter stool-softener.  Your Pharmacist can assist you with choosing one that is stocked at your pharmacy.  Constipation is also one of the most common side effects of pain medication.  If you are using pain medication, be pro-active and try to PREVENT problems with constipation by taking the steps above BEFORE constipation becomes a problem.    Q:  What do I do if I need more pain medications?  A:  Call the office to receive refills.  Be aware that certain pain meds cannot be called into a pharmacy and actually require a paper prescription.  A change may be made in your pain med as you progress thru your recovery period or if you have side effects to certain meds.    --Pain meds are NOT refilled after 5pm on weekdays, and NOT AT ALL on the weekends, so please look ahead to prevent problems.      Q:  Why am I having a hard time sleeping now that I am at home?  A:  Many medications you receive while you are in the hospital can impact your sleep for a number of days after your surgery/hospitalization.  Decreased level of activity and naps during the day may also make sleeping at night difficult.  Try to minimize day-time naps, and get up frequently during the day to walk around your home during your recovery time.  Sleep aides may be of some help, but are not recommended for long-term use.       Q:  I am having some back discomfort.  What should I do?  A:  This may be related to certain positioning that was required for your surgery, extended periods of time in bed, or other changes in your overall activity level.  You may try ice, heat, acetaminophen, or ibuprofen to treat this temporarily.  Note that many pain medications have acetaminophen in them and would state this on the prescription bottle.  Be sure not to exceed the maximum of 4000mg per day of acetaminophen.     **If the pain you are having does not resolve, is severe, or is a flare of back pain you have had on other occasions prior to surgery, please contact your primary physician for further recommendations or for an appointment to be examined at their office.    Q:  Why am I having headaches?  A:  Headaches can be caused by many things:  caffeine withdrawal, use of pain meds, dehydration, high blood pressure, lack of sleep, over-activity/exhaustion, flare-up of usual migraine headaches.  If you feel this is related to muscle tension (a band-like feeling around the head, or a pressure at the low-back of the head) you may try ice or heat to this area.  You may need to drink more fluids (try electrolyte drink like Gatorade), rest, or take your usual migraine medications.   **If your headaches do not resolve, worsen, are accompanied by other symptoms, or if your blood pressure is high, please call your primary physician for recommendation and/or examination.        If you have other questions, please call the office Monday thru Friday between 8am and 5pm to discuss with the nurse or physician assistant.  #[phone]    There is a surgeon ON CALL on weekday evenings and over the weekend in case of urgent need only, and may be contacted at the same number.    If you are having an emergency, call 911 or proceed to your nearest emergency department.

## 2019-08-22 NOTE — PLAN OF CARE
Patient alert and oriented x4. Up independent in room. Pain 4/10, PO pain medication given x1, decrease in pain reported. Transitioned to PO antibiotic. Mag replacement started, pt lost IV shortly after infusion starting, MD okay'd stopping of infusion and no need to finish replacement. Advanced to a regular, soft diet, tolerating for lunch, denies nausea, and increase in pain.  used as needed. Wife at bedside and updated on POC. Plan is to discharge later this afternoon if continuing to tolerate diet, discharge medications locked in med room.

## 2019-08-22 NOTE — PROGRESS NOTES
"[facility]  General Surgery Progress Note         Assessment and Plan:   Assessment:   -C/S for complicated sigmoid diverticulitis with possible early abscess  -Afebrile      Plan:   -Diet: full liquids for breakfast, then soft diet.  Continue soft diet for 3-5 days until abd pain fully resolves.  -Continue IV antibiotics: Zosyn  -GI prophylaxis: Protonix  -Pain control: acetaminophen, ibuprofen, oxycodone PRN  -Possible DC later today if tolerating diet without increase in pain. DC Rx: oxycodone, Augmentin x 2 weeks.  RTC 2 weeks for postop appt with Dr Barrios; discuss/plan outpt colonoscopy in 6 weeks and possible future surgery.  Discharge instructions were reviewed with the patient in detail.  All his questions/concerns were addressed.  He is aware that a printed copy of instructions will be given to him upon discharge.  -Work note done     Addendum  Agree with above  Pt ready for discharge. Afebrile, no leukocytosis, pain improved, tolerating diet  Jaimee Barrios MD          Interval History:   Comfortable in bed, pain much improved but noted pain overnight when lying on his side.  Oxycodone was taken and quite helpful.  No fevers/chills/sweats.  Tolerating diet thus far without early satiety, good appetite.  No bloating sensation.  Discussed increase of diet to soft foods and remaining on this for a few days as abd pain continues to resolve.  Pt likes yogurt, so this was recommended daily while on antibiotics.  Discussed f/u plan and future colonoscopy.         Physical Exam:   Blood pressure 135/75, pulse 80, temperature 98.2  F (36.8  C), temperature source Oral, resp. rate 18, height 1.702 m (5' 7\"), weight 76.8 kg (169 lb 6.4 oz), SpO2 98 %.    I/O last 3 completed shifts:  In: 3065 [I.V.:3065]  Out: -     Abdomen: soft, non-distended, very slight TTP in LLQ, normal BS          Data:     Recent Labs   Lab 08/22/19  0710 08/21/19  0727 08/20/19  1442 08/20/19  0704   WBC 10.5 13.2*  --  " 12.7*   HGB 13.3 13.5 13.4 12.9*   HCT 40.3 39.7*  --  40.0   MCV 91 90  --  94    128*  --  232     Recent Labs   Lab 08/21/19  0727 08/20/19  0704 08/19/19  0756    138 135   POTASSIUM 4.4 4.5 4.3   CHLORIDE 110* 109 106   CO2 23 28 22   ANIONGAP 4 1* 7   * 132* 137*   BUN 12 12 14   CR 0.96 1.14 0.93   GFRESTIMATED 89 72 >90   GFRESTBLACK >90 83 >90   JOSEFINA 8.7 8.5 9.5   MAG 1.9  --  1.7   PROTTOTAL  --   --  7.9   ALBUMIN  --   --  4.2   BILITOTAL  --   --  0.8   ALKPHOS  --   --  90   AST  --   --  28   ALT  --   --  52       Alisia Flores PA-C

## 2019-09-04 ENCOUNTER — OFFICE VISIT (OUTPATIENT)
Dept: SURGERY | Facility: CLINIC | Age: 54
End: 2019-09-04
Payer: COMMERCIAL

## 2019-09-04 ENCOUNTER — HOSPITAL ENCOUNTER (OUTPATIENT)
Dept: LAB | Facility: CLINIC | Age: 54
DRG: 392 | End: 2019-09-04
Attending: SURGERY
Payer: COMMERCIAL

## 2019-09-04 ENCOUNTER — HOSPITAL ENCOUNTER (OUTPATIENT)
Dept: CT IMAGING | Facility: CLINIC | Age: 54
DRG: 392 | End: 2019-09-04
Attending: SURGERY
Payer: COMMERCIAL

## 2019-09-04 ENCOUNTER — HOSPITAL ENCOUNTER (INPATIENT)
Facility: CLINIC | Age: 54
LOS: 6 days | Discharge: HOME | DRG: 392 | End: 2019-09-10
Attending: SURGERY | Admitting: SURGERY
Payer: COMMERCIAL

## 2019-09-04 VITALS
BODY MASS INDEX: 26.53 KG/M2 | DIASTOLIC BLOOD PRESSURE: 86 MMHG | HEART RATE: 82 BPM | TEMPERATURE: 98.5 F | RESPIRATION RATE: 16 BRPM | OXYGEN SATURATION: 97 % | SYSTOLIC BLOOD PRESSURE: 120 MMHG | WEIGHT: 169 LBS | HEIGHT: 67 IN

## 2019-09-04 VITALS
DIASTOLIC BLOOD PRESSURE: 86 MMHG | OXYGEN SATURATION: 97 % | WEIGHT: 169 LBS | HEART RATE: 82 BPM | BODY MASS INDEX: 26.53 KG/M2 | SYSTOLIC BLOOD PRESSURE: 120 MMHG | RESPIRATION RATE: 16 BRPM | HEIGHT: 67 IN

## 2019-09-04 DIAGNOSIS — K57.32 DIVERTICULITIS OF COLON: Primary | ICD-10-CM

## 2019-09-04 DIAGNOSIS — K65.1 INTRA-ABDOMINAL ABSCESS (H): ICD-10-CM

## 2019-09-04 DIAGNOSIS — B37.9 YEAST INFECTION: Primary | ICD-10-CM

## 2019-09-04 DIAGNOSIS — A49.8 ESCHERICHIA COLI (E. COLI) INFECTION: ICD-10-CM

## 2019-09-04 DIAGNOSIS — A31.9: ICD-10-CM

## 2019-09-04 DIAGNOSIS — K57.92 DIVERTICULITIS: ICD-10-CM

## 2019-09-04 DIAGNOSIS — K57.92 DIVERTICULITIS: Primary | ICD-10-CM

## 2019-09-04 DIAGNOSIS — Z16.342: ICD-10-CM

## 2019-09-04 LAB
ANION GAP SERPL CALCULATED.3IONS-SCNC: 4 MMOL/L (ref 3–14)
BUN SERPL-MCNC: 10 MG/DL (ref 7–30)
CALCIUM SERPL-MCNC: 9.3 MG/DL (ref 8.5–10.1)
CHLORIDE SERPL-SCNC: 101 MMOL/L (ref 94–109)
CO2 SERPL-SCNC: 29 MMOL/L (ref 20–32)
CREAT SERPL-MCNC: 1.01 MG/DL (ref 0.66–1.25)
ERYTHROCYTE [DISTWIDTH] IN BLOOD BY AUTOMATED COUNT: 12.4 % (ref 10–15)
GFR SERPL CREATININE-BSD FRML MDRD: 83 ML/MIN/{1.73_M2}
GLUCOSE SERPL-MCNC: 138 MG/DL (ref 70–99)
HCT VFR BLD AUTO: 39.3 % (ref 40–53)
HGB BLD-MCNC: 12.8 G/DL (ref 13.3–17.7)
INR PPP: 1.06 (ref 0.86–1.14)
MCH RBC QN AUTO: 29.5 PG (ref 26.5–33)
MCHC RBC AUTO-ENTMCNC: 32.6 G/DL (ref 31.5–36.5)
MCV RBC AUTO: 91 FL (ref 78–100)
PLATELET # BLD AUTO: 458 10E9/L (ref 150–450)
POTASSIUM SERPL-SCNC: 4.4 MMOL/L (ref 3.4–5.3)
RBC # BLD AUTO: 4.34 10E12/L (ref 4.4–5.9)
SODIUM SERPL-SCNC: 134 MMOL/L (ref 133–144)
WBC # BLD AUTO: 11 10E9/L (ref 4–11)

## 2019-09-04 PROCEDURE — 25800029 ZZH RX IP 258 OP 250: Performed by: PHYSICIAN ASSISTANT

## 2019-09-04 PROCEDURE — 25000128 H RX IP 250 OP 636: Performed by: PHYSICIAN ASSISTANT

## 2019-09-04 PROCEDURE — 85027 COMPLETE CBC AUTOMATED: CPT | Performed by: SURGERY

## 2019-09-04 PROCEDURE — 74177 CT ABD & PELVIS W/CONTRAST: CPT

## 2019-09-04 PROCEDURE — 25000128 H RX IP 250 OP 636: Performed by: SURGERY

## 2019-09-04 PROCEDURE — 25000132 ZZH RX MED GY IP 250 OP 250 PS 637: Performed by: PHYSICIAN ASSISTANT

## 2019-09-04 PROCEDURE — T1013 SIGN LANG/ORAL INTERPRETER: HCPCS | Mod: U3

## 2019-09-04 PROCEDURE — 25000125 ZZHC RX 250: Performed by: SURGERY

## 2019-09-04 PROCEDURE — 99214 OFFICE O/P EST MOD 30 MIN: CPT | Performed by: SURGERY

## 2019-09-04 PROCEDURE — 80048 BASIC METABOLIC PNL TOTAL CA: CPT | Performed by: SURGERY

## 2019-09-04 PROCEDURE — 99221 1ST HOSP IP/OBS SF/LOW 40: CPT | Performed by: SURGERY

## 2019-09-04 PROCEDURE — 85610 PROTHROMBIN TIME: CPT | Performed by: SURGERY

## 2019-09-04 PROCEDURE — 12000000 ZZH R&B MED SURG/OB

## 2019-09-04 RX ORDER — NALOXONE HYDROCHLORIDE 0.4 MG/ML
.1-.4 INJECTION, SOLUTION INTRAMUSCULAR; INTRAVENOUS; SUBCUTANEOUS
Status: DISCONTINUED | OUTPATIENT
Start: 2019-09-04 | End: 2019-09-10 | Stop reason: HOSPADM

## 2019-09-04 RX ORDER — LORAZEPAM 1 MG/1
1 TABLET ORAL EVERY 6 HOURS PRN
Status: DISCONTINUED | OUTPATIENT
Start: 2019-09-04 | End: 2019-09-10 | Stop reason: HOSPADM

## 2019-09-04 RX ORDER — OXYCODONE HYDROCHLORIDE 5 MG/1
5 TABLET ORAL EVERY 6 HOURS PRN
Qty: 20 TABLET | Refills: 0 | Status: ON HOLD | OUTPATIENT
Start: 2019-09-04 | End: 2019-09-10

## 2019-09-04 RX ORDER — HYDROMORPHONE HYDROCHLORIDE 1 MG/ML
0.2 INJECTION, SOLUTION INTRAMUSCULAR; INTRAVENOUS; SUBCUTANEOUS
Status: DISCONTINUED | OUTPATIENT
Start: 2019-09-04 | End: 2019-09-05

## 2019-09-04 RX ORDER — IBUPROFEN 600 MG/1
600 TABLET, FILM COATED ORAL EVERY 6 HOURS PRN
Status: DISCONTINUED | OUTPATIENT
Start: 2019-09-04 | End: 2019-09-10 | Stop reason: HOSPADM

## 2019-09-04 RX ORDER — LIDOCAINE 40 MG/G
CREAM TOPICAL
Status: DISCONTINUED | OUTPATIENT
Start: 2019-09-04 | End: 2019-09-10 | Stop reason: HOSPADM

## 2019-09-04 RX ORDER — ONDANSETRON 2 MG/ML
4 INJECTION INTRAMUSCULAR; INTRAVENOUS EVERY 6 HOURS PRN
Status: DISCONTINUED | OUTPATIENT
Start: 2019-09-04 | End: 2019-09-10 | Stop reason: HOSPADM

## 2019-09-04 RX ORDER — PANTOPRAZOLE SODIUM 20 MG/1
20 TABLET, DELAYED RELEASE ORAL
Status: DISCONTINUED | OUTPATIENT
Start: 2019-09-05 | End: 2019-09-10 | Stop reason: HOSPADM

## 2019-09-04 RX ORDER — ONDANSETRON 4 MG/1
4 TABLET, ORALLY DISINTEGRATING ORAL EVERY 6 HOURS PRN
Status: DISCONTINUED | OUTPATIENT
Start: 2019-09-04 | End: 2019-09-10 | Stop reason: HOSPADM

## 2019-09-04 RX ORDER — ACETAMINOPHEN 650 MG/1
650 SUPPOSITORY RECTAL EVERY 4 HOURS PRN
Status: DISCONTINUED | OUTPATIENT
Start: 2019-09-04 | End: 2019-09-10 | Stop reason: HOSPADM

## 2019-09-04 RX ORDER — IOPAMIDOL 755 MG/ML
500 INJECTION, SOLUTION INTRAVASCULAR ONCE
Status: COMPLETED | OUTPATIENT
Start: 2019-09-04 | End: 2019-09-04

## 2019-09-04 RX ORDER — OXYCODONE HYDROCHLORIDE 5 MG/1
5-10 TABLET ORAL
Status: DISCONTINUED | OUTPATIENT
Start: 2019-09-04 | End: 2019-09-10 | Stop reason: HOSPADM

## 2019-09-04 RX ORDER — ACETAMINOPHEN 325 MG/1
650 TABLET ORAL EVERY 4 HOURS PRN
Status: DISCONTINUED | OUTPATIENT
Start: 2019-09-04 | End: 2019-09-10 | Stop reason: HOSPADM

## 2019-09-04 RX ORDER — SODIUM CHLORIDE 450 MG/100ML
INJECTION, SOLUTION INTRAVENOUS CONTINUOUS
Status: DISCONTINUED | OUTPATIENT
Start: 2019-09-04 | End: 2019-09-10 | Stop reason: HOSPADM

## 2019-09-04 RX ADMIN — SODIUM CHLORIDE: 4.5 INJECTION, SOLUTION INTRAVENOUS at 17:28

## 2019-09-04 RX ADMIN — HYDROMORPHONE HYDROCHLORIDE 0.2 MG: 1 INJECTION, SOLUTION INTRAMUSCULAR; INTRAVENOUS; SUBCUTANEOUS at 17:35

## 2019-09-04 RX ADMIN — HYDROMORPHONE HYDROCHLORIDE 0.2 MG: 1 INJECTION, SOLUTION INTRAMUSCULAR; INTRAVENOUS; SUBCUTANEOUS at 20:34

## 2019-09-04 RX ADMIN — IOPAMIDOL 85 ML: 755 INJECTION, SOLUTION INTRAVENOUS at 15:14

## 2019-09-04 RX ADMIN — HYDROMORPHONE HYDROCHLORIDE 0.2 MG: 1 INJECTION, SOLUTION INTRAMUSCULAR; INTRAVENOUS; SUBCUTANEOUS at 22:30

## 2019-09-04 RX ADMIN — TAZOBACTAM SODIUM AND PIPERACILLIN SODIUM 3.38 G: 375; 3 INJECTION, SOLUTION INTRAVENOUS at 19:25

## 2019-09-04 RX ADMIN — SODIUM CHLORIDE 61 ML: 9 INJECTION, SOLUTION INTRAVENOUS at 15:14

## 2019-09-04 RX ADMIN — ACETAMINOPHEN 650 MG: 325 TABLET ORAL at 22:35

## 2019-09-04 ASSESSMENT — ACTIVITIES OF DAILY LIVING (ADL): ADLS_ACUITY_SCORE: 11

## 2019-09-04 ASSESSMENT — MIFFLIN-ST. JEOR
SCORE: 1560.21
SCORE: 1560.21

## 2019-09-04 NOTE — PROGRESS NOTES
"[facility] Note     Subjective:  Jim Mixon is here for follow up after his CT scan this afternoon. He was found to have worsening of his diverticulitis with multiple abscesses. He was asked to come to the clinic by Dr. Barrios so that he can be admitted to [facility] for IV antibiotics and IR drainage of his abscesses. The patient tells me that he saw Dr. Barrios this morning and that he subjectively feels better than when he was in the hospital, however, he still has LLQ pain. He is also complaining of some left lower back pain. He is voiding independently, his last BM was this morning (soft) and he has been on a liquid diet. His last dose of oral antibiotics is tomorrow. He is OK with being admitted to the hospital.    Objective:  /86   Pulse 82   Resp 16   Ht 1.702 m (5' 7\")   Wt 76.7 kg (169 lb)   SpO2 97%   BMI 26.47 kg/m                                                                 CT 9/4/19 IMPRESSION:   1. Increasing severity of sigmoid diverticulitis since 8/19/2019 now  with multiple abscesses, localized perforation and new left  hydronephrosis.    Assessment:  Complicated diverticulitis with multiple abscesses    Plan:  Admit to [facility]  IV ABX  NPO after midnight  Plan to have IR drain the abscesses      Atul Merchant PA-C  9/4/2019      Please route or send letter to:  *None*      "

## 2019-09-04 NOTE — PATIENT INSTRUCTIONS
CT ABDOMEN AND PELVIS WITH CONTRAST     Date: 9-4-19  Time: 3:00  PM  Location: [facility] [address] [place] [address]        Please check in at 2:30 PM      Preparation for CT scanning      Do not eat or drink anything TWO hours prior to your exam       LAB WORK TO BE DONE - WALK IN ONLY     [facility] [address] [place]  [address]

## 2019-09-04 NOTE — LETTER
2019    RE: Jim Mixon, : 1965      Assessment:    Jim Mixon is a 54 year old male with recurrent sigmoid diverticulitis.  I am considering this complicated diverticulitis due to evidence of microperforation with early abscess formation on his CT scan on admission 2 weeks ago.  Today he returns to clinic to discuss elective sigmoidectomy however he has ongoing pain in the left lower quadrant which indicates some smoldering diverticulitis..       Plan:    Given his ongoing symptoms we will obtain a CT scan of the abdomen and pelvis with IV contrast today as well as CBC.  We have discussed possible drain placement if there is an abscess versus admission for IV antibiotics if his symptoms worsen.  If the studies look okay today, I will give him another 2 weeks of oral antibiotics and we will plan for expedited sigmoidectomy in the next 2 weeks.  My goal was to perform a colonoscopy prior to his elective sigmoidectomy however if there is active diverticulitis this will likely need to be deferred.     We have discussed the goal of sigmoidectomy would be to perform a primary anastomosis however if there is significant contamination or abscess he understands he may need a temporary ostomy.     We have discussed laparoscopic-assisted sigmoid colectomy in detail.  We have discussed risks, recovery, anesthesia, preoperative bowel prep, postoperative hospitalization, postop limitations, incisions, bleeding, blood transfusion, postoperative infections, injury to intraabdominal organs and structures, open conversion, bowel resection, anastomotic leak, anastomotic stricture, abdominal wall hernia, intraabdominal adhesions causing bowel obstruction, DVT, and PE.  We have discussed interventions and treatment for these complications.   All questions have been answered to the best of my ability.  We will request Urology place bilateral ureteral stents at time of  "surgery.     Recommended time off work postop:  2-4 wks     He was prescribed 20 tablets of 5 mg oxycodone for his ongoing pain.                    Chief Complaint:   Recurrent sigmoid diverticulitis.             History of Present Illness:   Jim Mixon is a 54 year old  male with a history of recurrent sigmoid diverticulitis.  He was admitted on 8/19/2019 with complicated diverticulitis.  CT scan showed a very small amount of free air within the mesentery and stranding consistent with possible early abscess.  He clinically improved with IV antibiotics and bowel rest and was discharged on 8/22/19.  His previous episode of diverticulitis was approximately 5 years ago which occurred after he had his for screening colonoscopy at the age of 49.     Constipation-  No  Diarrhea-  No  Blood in the stool-  No  Colonoscopy-  done 5 years ago [facility] [place].      Today patient returns to clinic and states he had been doing fairly well however the last 3 days had increasing pain in the LLQ, similar to when he was admitted 2 weeks ago. He has switched over to a liquid diet. Oxycodone improves his pain. He has 2 days left of antibiotics. He has not had any nausea/vomiting or fevers though has had chills.          Past Medical History:   Has a past medical history of Anxiety, Diverticulitis, GERD (gastroesophageal reflux disease), and Hypertension.     Additional abdominal surgery: lap cholecystectomy           Family History:   Negative for colon cancer          Review of Systems:   The 10 point review of systems is negative other than noted in the HPI and above.          Physical Exam:   /86   Pulse 82   Temp 98.5  F (36.9  C) (Oral)   Resp 16   Ht 1.702 m (5' 7\")   Wt 76.7 kg (169 lb)   SpO2 97%   BMI 26.47 kg/m    General - Well developed, well nourished male in no apparent distress  HEENT:  Head normocephalic and atraumatic, pupils equal and round, conjunctivae clear, no scleral icterus, mucous " membranes moist, external ears and nose normal  Lungs: Breathing comfortably on room air  Heart: regular pulse  Abdomen: Abdomen appears rounded, mildly distended.  Focal tenderness in left lower quadrant.  There is no peritonitis  Extremities: Warm without edema  Neurologic: alert, speech is clear, moves all extremities with good strength  Psychiatric: Mood and affect appropriate  Skin: Without lesions, rashes, or juandice          Data:              Lab Results   Component Value Date     WBC 10.5 08/22/2019              Lab Results   Component Value Date     HGB 13.3 08/22/2019              Lab Results   Component Value Date      08/22/2019      Last Basic Metabolic Panel:          Lab Results   Component Value Date      08/21/2019              Lab Results   Component Value Date     POTASSIUM 4.4 08/21/2019              Lab Results   Component Value Date     CHLORIDE 110 08/21/2019              Lab Results   Component Value Date     JOSEFINA 8.7 08/21/2019              Lab Results   Component Value Date     CO2 23 08/21/2019              Lab Results   Component Value Date     BUN 12 08/21/2019              Lab Results   Component Value Date     CR 0.96 08/21/2019              Lab Results   Component Value Date      08/21/2019               All imaging studies reviewed by me.  Imaging:         Recent Results (from the past 744 hour(s))   CT Abdomen Pelvis w Contrast     Narrative     CT ABDOMEN AND PELVIS WITH CONTRAST August 19, 2019 9:22 AM     HISTORY: Left lower quadrant abdominal pain.     COMPARISON: None.     TECHNIQUE: Routine transverse CT imaging of the abdomen and pelvis was  performed following the uneventful administration of 84mL Isovue-370  intravenous contrast. Radiation dose for this scan was reduced using  automated exposure control, adjustment of the mA and/or kV according  to patient size, or iterative reconstruction technique.     FINDINGS: The visualized lung bases are clear.  There is mild diffuse  decreased density of the liver suggesting fatty infiltration. No focal  hepatic abnormality is seen. The spleen and pancreas are normal. There  has been a cholecystectomy. The adrenal glands are normal. There are a  few small cysts within both kidneys. No other urinary tract  abnormality is seen. No enlarged lymph node or other abnormal mass is  demonstrated. There are several diverticula throughout the sigmoid  colon. There is extensive thickening of the wall of the sigmoid colon  with mild inflammation in the adjacent mesenteric fat. There is a  small amount of fluid situated just posterior and superior to the  sigmoid. In addition, there is tracking of fluid along the wall of the  sigmoid colon posteriorly and inferiorly. No other gastrointestinal  tract abnormality is demonstrated. The appendix is not definitely  seen. There is no additional evidence of appendicitis. No vascular  abnormality is seen. The osseous structures are unremarkable. No  abdominal or pelvic wall pathology is demonstrated.         Impression     IMPRESSION: Extensive sigmoid diverticulitis. There is contained fluid  within the wall of the sigmoid as well as immediately adjacent to the  bowel suspicious for abscess formation.      MD Jaimee TAYLOR MD

## 2019-09-04 NOTE — PHARMACY-ADMISSION MEDICATION HISTORY
Admission medication history interview status for this patient is complete. See [facility] admission navigator for allergy information, prior to admission medications and immunization status.     Medication history interview source(s):Patient  Medication history resources (including written lists, pill bottles, clinic record):None    Changes made to PTA medication list:  Added: none  Deleted: oxycodone (duplicate)  Changed: none    Actions taken by pharmacist (provider contacted, etc):None     Additional medication history information:None    Medication reconciliation/reorder completed by provider prior to medication history? No    For patients on insulin therapy: no (Yes/No)   Lantus/levemir/NPH/Mix 70/30 dose: ___ in AM/PM or twice daily   Sliding scale Novolog Y/N   If Yes, do you have a baseline novolog pre-meal dose: ______units with meals   Patients eat three meals a day: Y/N ---  How many episodes of hypoglycemia (low blood glucose) do you have weekly: ---   How many missed doses do you have a week: ---  How many times do you check your blood glucose per day: ---  Any Barriers to therapy: cost of medications/comfortable with giving injections (if applicable)/ comfortable and confident with current diabetes regimen ---      Prior to Admission medications    Medication Sig Last Dose Taking? Auth Provider   acetaminophen (TYLENOL) 500 MG tablet Take 1,000 mg by mouth every 6 hours as needed for mild pain  Yes Unknown, Entered By History   amoxicillin-clavulanate (AUGMENTIN) 875-125 MG tablet Take 1 tablet by mouth 2 times daily for 14 days 9/4/2019 at am Yes Alisia Flores PA-C   esomeprazole (NEXIUM) 20 MG DR capsule Take 20 mg by mouth every morning (before breakfast) Take 30-60 minutes before eating. 9/3/2019 at Unknown time Yes Unknown, Entered By History   LORazepam (ATIVAN) 1 MG tablet Take 1 mg by mouth every 6 hours as needed for anxiety  Yes Unknown, Entered By History   oxyCODONE (ROXICODONE) 5 MG  tablet Take 1 tablet (5 mg) by mouth every 6 hours as needed for pain  Yes Jaimee Barrios MD

## 2019-09-04 NOTE — PROGRESS NOTES
General Surgery Consultation Follow Up                           Assessment and Plan:   Assessment:   Jim Mixon is a 54 year old male with recurrent sigmoid diverticulitis.  I am considering this complicated diverticulitis due to evidence of microperforation with early abscess formation on his CT scan on admission 2 weeks ago.  Today he returns to clinic to discuss elective sigmoidectomy however he has ongoing pain in the left lower quadrant which indicates some smoldering diverticulitis..       Plan:   Given his ongoing symptoms we will obtain a CT scan of the abdomen and pelvis with IV contrast today as well as CBC.  We have discussed possible drain placement if there is an abscess versus admission for IV antibiotics if his symptoms worsen.  If the studies look okay today, I will give him another 2 weeks of oral antibiotics and we will plan for expedited sigmoidectomy in the next 2 weeks.  My goal was to perform a colonoscopy prior to his elective sigmoidectomy however if there is active diverticulitis this will likely need to be deferred.    We have discussed the goal of sigmoidectomy would be to perform a primary anastomosis however if there is significant contamination or abscess he understands he may need a temporary ostomy.    We have discussed laparoscopic-assisted sigmoid colectomy in detail.  We have discussed risks, recovery, anesthesia, preoperative bowel prep, postoperative hospitalization, postop limitations, incisions, bleeding, blood transfusion, postoperative infections, injury to intraabdominal organs and structures, open conversion, bowel resection, anastomotic leak, anastomotic stricture, abdominal wall hernia, intraabdominal adhesions causing bowel obstruction, DVT, and PE.  We have discussed interventions and treatment for these complications.   All questions have been answered to the best of my ability.  We will request Urology place bilateral ureteral stents at time of  surgery.    Recommended time off work postop:  2-4 wks    He was prescribed 20 tablets of 5 mg oxycodone for his ongoing pain.                 Chief Complaint:   Recurrent sigmoid diverticulitis.           History of Present Illness:   Jim Mixon is a 54 year old  male with a history of recurrent sigmoid diverticulitis.  He was admitted on 8/19/2019 with complicated diverticulitis.  CT scan showed a very small amount of free air within the mesentery and stranding consistent with possible early abscess.  He clinically improved with IV antibiotics and bowel rest and was discharged on 8/22/19.  His previous episode of diverticulitis was approximately 5 years ago which occurred after he had his for screening colonoscopy at the age of 49.    Constipation-  No  Diarrhea-  No  Blood in the stool-  No  Colonoscopy-  done 5 years ago in [facility].     Today patient returns to clinic and states he had been doing fairly well however the last 3 days had increasing pain in the LLQ, similar to when he was admitted 2 weeks ago. He has switched over to a liquid diet. Oxycodone improves his pain. He has 2 days left of antibiotics. He has not had any nausea/vomiting or fevers though has had chills.         Past Medical History:    has a past medical history of Anxiety, Diverticulitis, GERD (gastroesophageal reflux disease), and Hypertension.          Past Surgical History:     Past Surgical History:   Procedure Laterality Date     CHOLECYSTECTOMY  01/2004     Additional abdominal surgery: lap cholecystectomy          Social History:     Social History     Tobacco Use     Smoking status: Never Smoker     Smokeless tobacco: Never Used   Substance Use Topics     Alcohol use: Not Currently             Family History:   Negative for colon cancer         Allergies:     Allergies   Allergen Reactions     Ciprofloxacin              Medications:     Current Outpatient Medications   Medication     acetaminophen (TYLENOL) 500  "MG tablet     amoxicillin-clavulanate (AUGMENTIN) 875-125 MG tablet     esomeprazole (NEXIUM) 20 MG DR capsule     LORazepam (ATIVAN) 1 MG tablet     oxyCODONE (ROXICODONE) 5 MG tablet     oxyCODONE (ROXICODONE) 5 MG tablet     No current facility-administered medications for this visit.             Review of Systems:   The 10 point review of systems is negative other than noted in the HPI and above.          Physical Exam:   /86   Pulse 82   Temp 98.5  F (36.9  C) (Oral)   Resp 16   Ht 1.702 m (5' 7\")   Wt 76.7 kg (169 lb)   SpO2 97%   BMI 26.47 kg/m    General - Well developed, well nourished male in no apparent distress  HEENT:  Head normocephalic and atraumatic, pupils equal and round, conjunctivae clear, no scleral icterus, mucous membranes moist, external ears and nose normal  Lungs: Breathing comfortably on room air  Heart: regular pulse  Abdomen: Abdomen appears rounded, mildly distended.  Focal tenderness in left lower quadrant.  There is no peritonitis  Extremities: Warm without edema  Neurologic: alert, speech is clear, moves all extremities with good strength  Psychiatric: Mood and affect appropriate  Skin: Without lesions, rashes, or juandice         Data:     Lab Results   Component Value Date    WBC 10.5 08/22/2019     Lab Results   Component Value Date    HGB 13.3 08/22/2019     Lab Results   Component Value Date     08/22/2019     Last Basic Metabolic Panel:  Lab Results   Component Value Date     08/21/2019      Lab Results   Component Value Date    POTASSIUM 4.4 08/21/2019     Lab Results   Component Value Date    CHLORIDE 110 08/21/2019     Lab Results   Component Value Date    JOSEFINA 8.7 08/21/2019     Lab Results   Component Value Date    CO2 23 08/21/2019     Lab Results   Component Value Date    BUN 12 08/21/2019     Lab Results   Component Value Date    CR 0.96 08/21/2019     Lab Results   Component Value Date     08/21/2019           All imaging studies " reviewed by me.  Imaging:    Recent Results (from the past 744 hour(s))   CT Abdomen Pelvis w Contrast    Narrative    CT ABDOMEN AND PELVIS WITH CONTRAST August 19, 2019 9:22 AM    HISTORY: Left lower quadrant abdominal pain.    COMPARISON: None.    TECHNIQUE: Routine transverse CT imaging of the abdomen and pelvis was  performed following the uneventful administration of 84mL Isovue-370  intravenous contrast. Radiation dose for this scan was reduced using  automated exposure control, adjustment of the mA and/or kV according  to patient size, or iterative reconstruction technique.    FINDINGS: The visualized lung bases are clear. There is mild diffuse  decreased density of the liver suggesting fatty infiltration. No focal  hepatic abnormality is seen. The spleen and pancreas are normal. There  has been a cholecystectomy. The adrenal glands are normal. There are a  few small cysts within both kidneys. No other urinary tract  abnormality is seen. No enlarged lymph node or other abnormal mass is  demonstrated. There are several diverticula throughout the sigmoid  colon. There is extensive thickening of the wall of the sigmoid colon  with mild inflammation in the adjacent mesenteric fat. There is a  small amount of fluid situated just posterior and superior to the  sigmoid. In addition, there is tracking of fluid along the wall of the  sigmoid colon posteriorly and inferiorly. No other gastrointestinal  tract abnormality is demonstrated. The appendix is not definitely  seen. There is no additional evidence of appendicitis. No vascular  abnormality is seen. The osseous structures are unremarkable. No  abdominal or pelvic wall pathology is demonstrated.       Impression    IMPRESSION: Extensive sigmoid diverticulitis. There is contained fluid  within the wall of the sigmoid as well as immediately adjacent to the  bowel suspicious for abscess formation.     NARESH ANGUIANO MD       This note was created using voice  recognition software. Undetected word substitutions or other errors may have occurred.     Time spent of which more than 50% was counseling and coordinating care:  30 minutes.       Jaimee Barrios MD

## 2019-09-05 ENCOUNTER — APPOINTMENT (OUTPATIENT)
Dept: CT IMAGING | Facility: CLINIC | Age: 54
DRG: 392 | End: 2019-09-05
Attending: SURGERY
Payer: COMMERCIAL

## 2019-09-05 ENCOUNTER — OFFICE VISIT (OUTPATIENT)
Dept: INTERPRETER SERVICES | Facility: CLINIC | Age: 54
End: 2019-09-05
Payer: COMMERCIAL

## 2019-09-05 VITALS
SYSTOLIC BLOOD PRESSURE: 120 MMHG | OXYGEN SATURATION: 94 % | DIASTOLIC BLOOD PRESSURE: 79 MMHG | TEMPERATURE: 97.9 F | RESPIRATION RATE: 20 BRPM

## 2019-09-05 LAB
ERYTHROCYTE [DISTWIDTH] IN BLOOD BY AUTOMATED COUNT: 12.4 % (ref 10–15)
GLUCOSE BLDC GLUCOMTR-MCNC: 84 MG/DL (ref 70–99)
HCT VFR BLD AUTO: 39.7 % (ref 40–53)
HGB BLD-MCNC: 12.9 G/DL (ref 13.3–17.7)
MCH RBC QN AUTO: 29.3 PG (ref 26.5–33)
MCHC RBC AUTO-ENTMCNC: 32.5 G/DL (ref 31.5–36.5)
MCV RBC AUTO: 90 FL (ref 78–100)
PLATELET # BLD AUTO: 479 10E9/L (ref 150–450)
RBC # BLD AUTO: 4.4 10E12/L (ref 4.4–5.9)
WBC # BLD AUTO: 10.6 10E9/L (ref 4–11)

## 2019-09-05 PROCEDURE — 87106 FUNGI IDENTIFICATION YEAST: CPT | Performed by: RADIOLOGY

## 2019-09-05 PROCEDURE — 25000128 H RX IP 250 OP 636

## 2019-09-05 PROCEDURE — 99231 SBSQ HOSP IP/OBS SF/LOW 25: CPT | Performed by: PHYSICIAN ASSISTANT

## 2019-09-05 PROCEDURE — T1013 SIGN LANG/ORAL INTERPRETER: HCPCS | Mod: U3

## 2019-09-05 PROCEDURE — 25000125 ZZHC RX 250

## 2019-09-05 PROCEDURE — 25000128 H RX IP 250 OP 636: Performed by: SURGERY

## 2019-09-05 PROCEDURE — 36415 COLL VENOUS BLD VENIPUNCTURE: CPT | Performed by: SURGERY

## 2019-09-05 PROCEDURE — 87076 CULTURE ANAEROBE IDENT EACH: CPT | Performed by: RADIOLOGY

## 2019-09-05 PROCEDURE — 87075 CULTR BACTERIA EXCEPT BLOOD: CPT | Performed by: RADIOLOGY

## 2019-09-05 PROCEDURE — 00000146 ZZHCL STATISTIC GLUCOSE BY METER IP

## 2019-09-05 PROCEDURE — 0D9W30Z DRAINAGE OF PERITONEUM WITH DRAINAGE DEVICE, PERCUTANEOUS APPROACH: ICD-10-PCS | Performed by: RADIOLOGY

## 2019-09-05 PROCEDURE — 25000128 H RX IP 250 OP 636: Performed by: RADIOLOGY

## 2019-09-05 PROCEDURE — 87186 SC STD MICRODIL/AGAR DIL: CPT | Performed by: RADIOLOGY

## 2019-09-05 PROCEDURE — C1729 CATH, DRAINAGE: HCPCS

## 2019-09-05 PROCEDURE — 25800029 ZZH RX IP 258 OP 250: Performed by: PHYSICIAN ASSISTANT

## 2019-09-05 PROCEDURE — 25000128 H RX IP 250 OP 636: Performed by: PHYSICIAN ASSISTANT

## 2019-09-05 PROCEDURE — 87070 CULTURE OTHR SPECIMN AEROBIC: CPT | Performed by: RADIOLOGY

## 2019-09-05 PROCEDURE — 87077 CULTURE AEROBIC IDENTIFY: CPT | Performed by: RADIOLOGY

## 2019-09-05 PROCEDURE — 12000000 ZZH R&B MED SURG/OB

## 2019-09-05 PROCEDURE — 85027 COMPLETE CBC AUTOMATED: CPT | Performed by: SURGERY

## 2019-09-05 RX ORDER — HYDROMORPHONE HYDROCHLORIDE 1 MG/ML
INJECTION, SOLUTION INTRAMUSCULAR; INTRAVENOUS; SUBCUTANEOUS
Status: COMPLETED
Start: 2019-09-05 | End: 2019-09-05

## 2019-09-05 RX ORDER — NALOXONE HYDROCHLORIDE 0.4 MG/ML
.1-.4 INJECTION, SOLUTION INTRAMUSCULAR; INTRAVENOUS; SUBCUTANEOUS
Status: DISCONTINUED | OUTPATIENT
Start: 2019-09-05 | End: 2019-09-05

## 2019-09-05 RX ORDER — HYDROMORPHONE HYDROCHLORIDE 1 MG/ML
.3-.5 INJECTION, SOLUTION INTRAMUSCULAR; INTRAVENOUS; SUBCUTANEOUS
Status: DISCONTINUED | OUTPATIENT
Start: 2019-09-05 | End: 2019-09-10 | Stop reason: HOSPADM

## 2019-09-05 RX ORDER — FENTANYL CITRATE 50 UG/ML
INJECTION, SOLUTION INTRAMUSCULAR; INTRAVENOUS
Status: COMPLETED
Start: 2019-09-05 | End: 2019-09-05

## 2019-09-05 RX ORDER — LIDOCAINE HYDROCHLORIDE 10 MG/ML
INJECTION, SOLUTION INFILTRATION; PERINEURAL
Status: COMPLETED
Start: 2019-09-05 | End: 2019-09-05

## 2019-09-05 RX ORDER — FLUMAZENIL 0.1 MG/ML
0.2 INJECTION, SOLUTION INTRAVENOUS
Status: DISCONTINUED | OUTPATIENT
Start: 2019-09-05 | End: 2019-09-05

## 2019-09-05 RX ORDER — FENTANYL CITRATE 50 UG/ML
25-50 INJECTION, SOLUTION INTRAMUSCULAR; INTRAVENOUS EVERY 5 MIN PRN
Status: DISCONTINUED | OUTPATIENT
Start: 2019-09-05 | End: 2019-09-05

## 2019-09-05 RX ADMIN — FENTANYL CITRATE 50 MCG: 50 INJECTION INTRAMUSCULAR; INTRAVENOUS at 16:13

## 2019-09-05 RX ADMIN — HYDROMORPHONE HYDROCHLORIDE 0.5 MG: 10 INJECTION, SOLUTION INTRAMUSCULAR; INTRAVENOUS; SUBCUTANEOUS at 21:15

## 2019-09-05 RX ADMIN — SODIUM CHLORIDE: 4.5 INJECTION, SOLUTION INTRAVENOUS at 17:19

## 2019-09-05 RX ADMIN — TAZOBACTAM SODIUM AND PIPERACILLIN SODIUM 3.38 G: 375; 3 INJECTION, SOLUTION INTRAVENOUS at 01:19

## 2019-09-05 RX ADMIN — TAZOBACTAM SODIUM AND PIPERACILLIN SODIUM 3.38 G: 375; 3 INJECTION, SOLUTION INTRAVENOUS at 12:26

## 2019-09-05 RX ADMIN — SODIUM CHLORIDE: 4.5 INJECTION, SOLUTION INTRAVENOUS at 04:58

## 2019-09-05 RX ADMIN — HYDROMORPHONE HYDROCHLORIDE 0.2 MG: 1 INJECTION, SOLUTION INTRAMUSCULAR; INTRAVENOUS; SUBCUTANEOUS at 06:19

## 2019-09-05 RX ADMIN — MIDAZOLAM HYDROCHLORIDE 1 MG: 1 INJECTION, SOLUTION INTRAMUSCULAR; INTRAVENOUS at 15:54

## 2019-09-05 RX ADMIN — FENTANYL CITRATE 50 MCG: 50 INJECTION INTRAMUSCULAR; INTRAVENOUS at 15:56

## 2019-09-05 RX ADMIN — HYDROMORPHONE HYDROCHLORIDE 0.5 MG: 1 INJECTION, SOLUTION INTRAMUSCULAR; INTRAVENOUS; SUBCUTANEOUS at 10:32

## 2019-09-05 RX ADMIN — HYDROMORPHONE HYDROCHLORIDE 0.2 MG: 1 INJECTION, SOLUTION INTRAMUSCULAR; INTRAVENOUS; SUBCUTANEOUS at 01:19

## 2019-09-05 RX ADMIN — TAZOBACTAM SODIUM AND PIPERACILLIN SODIUM 3.38 G: 375; 3 INJECTION, SOLUTION INTRAVENOUS at 19:14

## 2019-09-05 RX ADMIN — HYDROMORPHONE HYDROCHLORIDE 0.5 MG: 10 INJECTION, SOLUTION INTRAMUSCULAR; INTRAVENOUS; SUBCUTANEOUS at 17:14

## 2019-09-05 RX ADMIN — HYDROMORPHONE HYDROCHLORIDE 0.5 MG: 10 INJECTION, SOLUTION INTRAMUSCULAR; INTRAVENOUS; SUBCUTANEOUS at 23:58

## 2019-09-05 RX ADMIN — HYDROMORPHONE HYDROCHLORIDE 0.5 MG: 10 INJECTION, SOLUTION INTRAMUSCULAR; INTRAVENOUS; SUBCUTANEOUS at 19:14

## 2019-09-05 RX ADMIN — HYDROMORPHONE HYDROCHLORIDE 0.2 MG: 1 INJECTION, SOLUTION INTRAMUSCULAR; INTRAVENOUS; SUBCUTANEOUS at 08:25

## 2019-09-05 RX ADMIN — MIDAZOLAM HYDROCHLORIDE 1 MG: 1 INJECTION, SOLUTION INTRAMUSCULAR; INTRAVENOUS at 16:12

## 2019-09-05 RX ADMIN — HYDROMORPHONE HYDROCHLORIDE 0.5 MG: 10 INJECTION, SOLUTION INTRAMUSCULAR; INTRAVENOUS; SUBCUTANEOUS at 12:40

## 2019-09-05 RX ADMIN — FENTANYL CITRATE 50 MCG: 50 INJECTION INTRAMUSCULAR; INTRAVENOUS at 15:54

## 2019-09-05 RX ADMIN — HYDROMORPHONE HYDROCHLORIDE 0.2 MG: 1 INJECTION, SOLUTION INTRAMUSCULAR; INTRAVENOUS; SUBCUTANEOUS at 04:37

## 2019-09-05 RX ADMIN — TAZOBACTAM SODIUM AND PIPERACILLIN SODIUM 3.38 G: 375; 3 INJECTION, SOLUTION INTRAVENOUS at 06:46

## 2019-09-05 RX ADMIN — MIDAZOLAM HYDROCHLORIDE 1 MG: 1 INJECTION, SOLUTION INTRAMUSCULAR; INTRAVENOUS at 15:56

## 2019-09-05 RX ADMIN — LIDOCAINE HYDROCHLORIDE 20 MG: 10 INJECTION, SOLUTION EPIDURAL; INFILTRATION; INTRACAUDAL; PERINEURAL at 16:00

## 2019-09-05 ASSESSMENT — ACTIVITIES OF DAILY LIVING (ADL)
ADLS_ACUITY_SCORE: 10

## 2019-09-05 NOTE — PLAN OF CARE
Patient admitted to floor from general surg clinic for IV abx and procedure, scheduled for tomorrow. Patient is alert, oriented. Speaks Slovak as first language, understands and speaks english well. Spouse will have  for procedure. Oriented to floor and POC.Started on fluids and IV zosyn.

## 2019-09-05 NOTE — PLAN OF CARE
[id number]: Pt resting comfortably. VSS. A&O. Pain managed with dilaudid. Pt c/o severe gas pain this AM. Pt had a small bm and showed some improvement. NPO since 0000. SBA. AUO. BS +. Will continue to monitor and provide care.

## 2019-09-05 NOTE — PLAN OF CARE
Alert/oriented. No hospitalist assigned. Primary is Surgery. LLQ abdominal pain, constant, sharp, ache in morning. Dilaudid 0.2 not sufficient pain med dose, increased to 0.5mg and pain less. 1 loose stool per pt, not witnessed. NPO for drain placement at 1430 today.

## 2019-09-05 NOTE — PLAN OF CARE
Pt having increased 9/10 pain possible gas pain. NPO ex ice for procedure today. Not due for dilaudid yet. please advise. Thank you

## 2019-09-05 NOTE — PROCEDURES
[facility]    Procedure: CT guided drain placement   Date/Time: 9/5/2019 4:13 PM  Performed by: Jesse Caldwell MD  Authorized by: Jesse Caldwell MD     UNIVERSAL PROTOCOL   Site Marked: Yes  Prior Images Obtained and Reviewed:  Yes  Required items: Required blood products, implants, devices and special equipment available    Patient identity confirmed:  Verbally with patient  Patient was reevaluated immediately before administering moderate or deep sedation or anesthesia  Confirmation Checklist:  Patient's identity using two indicators, procedure was appropriate and matched the consent or emergent situation, correct equipment/implants were available and relevant allergies  Time out: Immediately prior to the procedure a time out was called    Preparation: Patient was prepped and draped in usual sterile fashion       ANESTHESIA    Anesthesia: Local infiltration  Local Anesthetic:  Lidocaine 1% without epinephrine      SEDATION    Patient Sedated: Yes    Sedation Type:  Moderate (conscious) sedation  Sedation:  See MAR for details  Vital signs: Vital signs monitored during sedation    PROCEDURE   Patient Tolerance:  Patient tolerated the procedure well with no immediate complications    Time of Sedation in Minutes by Physician:  15

## 2019-09-05 NOTE — SEDATION DOCUMENTATION
Consent obtained from Dr. Caldwell for Left Peritoneum aspirate and drain placement.  VSS.  Oxygen applied 2L NC.  Sedation given.  Versed 3mg and Fentanyl 150mcg.  Pt tolerated well.  10Fr. Drain inserted to bulb suction.  New drsg applied.  Site Left CDI.  See Epic for drain flush orders.  Pt transferred back to Room 328.  Specimen sent to lab.  Report called to floor RN.

## 2019-09-05 NOTE — PROGRESS NOTES
[facility]   General Surgery Progress Note          Assessment and Plan:   Assessment:   Complicated diverticulitis with multiple abscesses  Tmax 100.1      Plan:   -CT guided abscess drainage scheduled for late morning  -NPO for procedure  -Continue IV ABX: Zosyn  -Pain Mgmt: IV Dilaudid         Interval History:   Seen with  so his wife can understand and ask questions. Patient speaks English well. Resting in bed. Feels worse today. States he had quite a bit of pain last night. Some acute pain with passing flatus, he then had a BM and felt improved. He requests an increase in IV dilaudid dose (currently 0.2mg). He is now NPO. +voiding.         Physical Exam:   Blood pressure 125/79, pulse 83, temperature 99  F (37.2  C), temperature source Oral, resp. rate 18, SpO2 95 %.    No intake/output data recorded.    Abdomen: soft, +LLQ tenderness, +BS          Data:     Recent Labs   Lab Test 09/05/19  0641 09/04/19  1913 08/22/19  0710   HGB 12.9* 12.8* 13.3   WBC 10.6 11.0 10.5          Atul Merchant PA-C    Pt looks clinically well. Had significant pain last night, better today. Having BMs. Afebrile, no leukocytosis, VSS  NPO for procedure  Discussed with IR, they will attempt drain placement but may be limited by location of abscess.  If unsuccessful drainage, may need operative drainage versus extended IV antibiotics versus more urgent sigmoidectomy.   Discussed with patient that if he undergoes sigmoidectomy with abscesses and contamination present he will likely need an ostomy so goal would be to treat abscess first  Would be okay for full liquids after procedure completed.    Jaimee Barrios MD

## 2019-09-05 NOTE — PRE-PROCEDURE
GENERAL PRE-PROCEDURE:   Procedure:  CT aspiration/drain    Written consent obtained?: Yes    Risks and benefits: Risks, benefits and alternatives were discussed    Consent given by:  Patient  Patient states understanding of procedure being performed: Yes    Patient's understanding of procedure matches consent: Yes    Procedure consent matches procedure scheduled: Yes    Expected level of sedation:  Moderate  Appropriately NPO:  Yes  ASA Class:  Class 2- mild systemic disease, no acute problems, no functional limitations  Mallampati  :  Grade 2- soft palate, base of uvula, tonsillar pillars, and portion of posterior pharyngeal wall visible  Lungs:  Lungs clear with good breath sounds bilaterally  Heart:  Normal heart sounds and rate  History & Physical reviewed:  History and physical reviewed and no updates needed  Statement of review:  I have reviewed the lab findings, diagnostic data, medications, and the plan for sedation

## 2019-09-06 ENCOUNTER — OFFICE VISIT (OUTPATIENT)
Dept: INTERPRETER SERVICES | Facility: CLINIC | Age: 54
End: 2019-09-06

## 2019-09-06 ENCOUNTER — OFFICE VISIT (OUTPATIENT)
Dept: INTERPRETER SERVICES | Facility: CLINIC | Age: 54
End: 2019-09-06
Payer: COMMERCIAL

## 2019-09-06 PROCEDURE — 25000128 H RX IP 250 OP 636: Performed by: PHYSICIAN ASSISTANT

## 2019-09-06 PROCEDURE — 25000132 ZZH RX MED GY IP 250 OP 250 PS 637: Performed by: SURGERY

## 2019-09-06 PROCEDURE — 25800029 ZZH RX IP 258 OP 250: Performed by: PHYSICIAN ASSISTANT

## 2019-09-06 PROCEDURE — 25000128 H RX IP 250 OP 636: Performed by: SURGERY

## 2019-09-06 PROCEDURE — 25000132 ZZH RX MED GY IP 250 OP 250 PS 637: Performed by: PHYSICIAN ASSISTANT

## 2019-09-06 PROCEDURE — 12000000 ZZH R&B MED SURG/OB

## 2019-09-06 PROCEDURE — 99231 SBSQ HOSP IP/OBS SF/LOW 25: CPT | Performed by: SURGERY

## 2019-09-06 PROCEDURE — T1013 SIGN LANG/ORAL INTERPRETER: HCPCS | Mod: U3

## 2019-09-06 RX ORDER — CALCIUM CARBONATE 500 MG/1
1000 TABLET, CHEWABLE ORAL EVERY 4 HOURS PRN
Status: DISCONTINUED | OUTPATIENT
Start: 2019-09-06 | End: 2019-09-10 | Stop reason: HOSPADM

## 2019-09-06 RX ORDER — PIPERACILLIN SODIUM, TAZOBACTAM SODIUM 3; .375 G/15ML; G/15ML
3.38 INJECTION, POWDER, LYOPHILIZED, FOR SOLUTION INTRAVENOUS EVERY 6 HOURS
Status: DISCONTINUED | OUTPATIENT
Start: 2019-09-06 | End: 2019-09-07

## 2019-09-06 RX ADMIN — OXYCODONE HYDROCHLORIDE 5 MG: 5 TABLET ORAL at 04:03

## 2019-09-06 RX ADMIN — OXYCODONE HYDROCHLORIDE 10 MG: 5 TABLET ORAL at 09:31

## 2019-09-06 RX ADMIN — OXYCODONE HYDROCHLORIDE 10 MG: 5 TABLET ORAL at 12:28

## 2019-09-06 RX ADMIN — HYDROMORPHONE HYDROCHLORIDE 0.5 MG: 10 INJECTION, SOLUTION INTRAMUSCULAR; INTRAVENOUS; SUBCUTANEOUS at 08:09

## 2019-09-06 RX ADMIN — SODIUM CHLORIDE: 4.5 INJECTION, SOLUTION INTRAVENOUS at 16:19

## 2019-09-06 RX ADMIN — OXYCODONE HYDROCHLORIDE 10 MG: 5 TABLET ORAL at 21:57

## 2019-09-06 RX ADMIN — PANTOPRAZOLE SODIUM 20 MG: 20 TABLET, DELAYED RELEASE ORAL at 06:32

## 2019-09-06 RX ADMIN — HYDROMORPHONE HYDROCHLORIDE 0.5 MG: 10 INJECTION, SOLUTION INTRAMUSCULAR; INTRAVENOUS; SUBCUTANEOUS at 13:31

## 2019-09-06 RX ADMIN — HYDROMORPHONE HYDROCHLORIDE 0.5 MG: 10 INJECTION, SOLUTION INTRAMUSCULAR; INTRAVENOUS; SUBCUTANEOUS at 20:14

## 2019-09-06 RX ADMIN — SODIUM CHLORIDE: 4.5 INJECTION, SOLUTION INTRAVENOUS at 06:09

## 2019-09-06 RX ADMIN — HYDROMORPHONE HYDROCHLORIDE 0.5 MG: 10 INJECTION, SOLUTION INTRAMUSCULAR; INTRAVENOUS; SUBCUTANEOUS at 10:35

## 2019-09-06 RX ADMIN — HYDROMORPHONE HYDROCHLORIDE 0.5 MG: 10 INJECTION, SOLUTION INTRAMUSCULAR; INTRAVENOUS; SUBCUTANEOUS at 05:04

## 2019-09-06 RX ADMIN — OXYCODONE HYDROCHLORIDE 10 MG: 5 TABLET ORAL at 06:32

## 2019-09-06 RX ADMIN — TAZOBACTAM SODIUM AND PIPERACILLIN SODIUM 3.38 G: 375; 3 INJECTION, SOLUTION INTRAVENOUS at 06:32

## 2019-09-06 RX ADMIN — HYDROMORPHONE HYDROCHLORIDE 0.5 MG: 10 INJECTION, SOLUTION INTRAMUSCULAR; INTRAVENOUS; SUBCUTANEOUS at 23:31

## 2019-09-06 RX ADMIN — OXYCODONE HYDROCHLORIDE 10 MG: 5 TABLET ORAL at 15:35

## 2019-09-06 RX ADMIN — TAZOBACTAM SODIUM AND PIPERACILLIN SODIUM 3.38 G: 375; 3 INJECTION, SOLUTION INTRAVENOUS at 00:03

## 2019-09-06 RX ADMIN — HYDROMORPHONE HYDROCHLORIDE 0.5 MG: 10 INJECTION, SOLUTION INTRAMUSCULAR; INTRAVENOUS; SUBCUTANEOUS at 02:40

## 2019-09-06 RX ADMIN — CALCIUM CARBONATE (ANTACID) CHEW TAB 500 MG 1000 MG: 500 CHEW TAB at 21:31

## 2019-09-06 RX ADMIN — TAZOBACTAM SODIUM AND PIPERACILLIN SODIUM 3.38 G: 375; 3 INJECTION, SOLUTION INTRAVENOUS at 13:32

## 2019-09-06 RX ADMIN — HYDROMORPHONE HYDROCHLORIDE 0.5 MG: 10 INJECTION, SOLUTION INTRAMUSCULAR; INTRAVENOUS; SUBCUTANEOUS at 16:53

## 2019-09-06 RX ADMIN — OXYCODONE HYDROCHLORIDE 10 MG: 5 TABLET ORAL at 18:44

## 2019-09-06 RX ADMIN — PIPERACILLIN SODIUM,TAZOBACTAM SODIUM 3.38 G: 3; .375 INJECTION, POWDER, FOR SOLUTION INTRAVENOUS at 19:37

## 2019-09-06 RX ADMIN — OXYCODONE HYDROCHLORIDE 5 MG: 5 TABLET ORAL at 03:22

## 2019-09-06 ASSESSMENT — ACTIVITIES OF DAILY LIVING (ADL)
ADLS_ACUITY_SCORE: 10
ADLS_ACUITY_SCORE: 12
ADLS_ACUITY_SCORE: 10

## 2019-09-06 NOTE — PLAN OF CARE
KARIN drain placed for LLQ abcess  A&Ox4, up SBA  LDA: PIV infusing  Vitals: stable, 2L/NC  Pain: controlled with IV dilaudid & PO oxy, encouraged use of ice & tylenol but declined  Tolerating sips of clears  Skin: KARIN drain patent, flush q8h  GI/: Voiding, no flatus  Followed by Surgery  Plan: IV zosyn  Will continue to monitor

## 2019-09-06 NOTE — PROGRESS NOTES
[facility]   General Surgery Progress Note          Assessment and Plan:   Assessment:   Complicated diverticulitis with multiple abscesses, s/p IR drain placement 9/5  Afebrile      Plan:   -full liquid diet.  Protein shakes ordered.  -Continue IV ABX: Zosyn  -Pain Mgmt: Oxycodone, Tylenol, Ibuprofen PRN  -continue conservative management         Interval History:   Feels significant improvement in abdominal pain today.  Now primary c/o pain at drain site, controlled with pain meds.   + tolerating clear liquid diet and passing flatus.  Denies bloating or nausea.  Last BM was yesterday afternoon.  Voiding normally.         Physical Exam:   Blood pressure (!) 147/83, pulse 96, temperature 96.5  F (35.8  C), temperature source Oral, resp. rate 20, SpO2 96 %.    I/O last 3 completed shifts:  In: 937 [P.O.:275; I.V.:642; Other:20]  Out: 235 [Urine:200; Drains:35]    Abdomen: soft, rounded, + tender at LLQ drain site, + active bowel sounds  IR drain at LLQ:  Thin, cloudy brown fluid in bulb.          Data:     Recent Labs   Lab Test 09/05/19  0641 09/04/19  1913 08/22/19  0710   HGB 12.9* 12.8* 13.3   WBC 10.6 11.0 10.5              Desiree Flores PA-C

## 2019-09-06 NOTE — PLAN OF CARE
DX :  Diverticulitis with abscess.    HX : Anxiety, GERD, HTN, Diverticulitis  LABS : K 4.4, HGB 12.8  TELE:   None  GI/ : Continent  DIET : NPO except for ice  ASSESS; A&OX4. Ls clear. Abdomen tender and soft. Left peritoneum drain intact, draining serosanguineous  drainage.site intact. No redness nor swelling noted.   PAIN : Dialudid for pain management X3  ACTIVITY :  Up with assist of 1 to bathroom .   TEACHING : Use of IS and deep breathing.   PLAN FOR DISCHARGE : TBD

## 2019-09-06 NOTE — PLAN OF CARE
Pain: Has c/o abdominal pain.  Alternating Dilaudid and Oxycodone for pain control.  LOC: alert and oriented  Mobility: SBA  Lungs: clear.  96% RA  Tele: No tele  GI: Full liquid diet.  KARIN drain left abdomen draining brown/tan fluid.  : voiding without difficulty  IV: PIV infusing per orders  Other: Continue IV zosyn and PRN pain control per orders.    15:50  Pt and family are requesting to speak with someone about depression/mood concerns. Sticky note left for MD.

## 2019-09-07 PROBLEM — B37.9 YEAST INFECTION: Status: ACTIVE | Noted: 2019-09-07

## 2019-09-07 PROBLEM — Z16.342: Status: ACTIVE | Noted: 2019-09-07

## 2019-09-07 PROBLEM — A31.9: Status: ACTIVE | Noted: 2019-09-07

## 2019-09-07 PROBLEM — A49.8 ESCHERICHIA COLI (E. COLI) INFECTION: Status: ACTIVE | Noted: 2019-09-07

## 2019-09-07 PROBLEM — K65.1 INTRA-ABDOMINAL ABSCESS (H): Status: ACTIVE | Noted: 2019-09-07

## 2019-09-07 LAB
ANION GAP SERPL CALCULATED.3IONS-SCNC: 3 MMOL/L (ref 3–14)
BACTERIA SPEC CULT: ABNORMAL
BASOPHILS # BLD AUTO: 0 10E9/L (ref 0–0.2)
BASOPHILS NFR BLD AUTO: 0.6 %
BUN SERPL-MCNC: 8 MG/DL (ref 7–30)
CALCIUM SERPL-MCNC: 9.5 MG/DL (ref 8.5–10.1)
CHLORIDE SERPL-SCNC: 105 MMOL/L (ref 94–109)
CO2 SERPL-SCNC: 30 MMOL/L (ref 20–32)
CREAT SERPL-MCNC: 1.05 MG/DL (ref 0.66–1.25)
DIFFERENTIAL METHOD BLD: ABNORMAL
EOSINOPHIL # BLD AUTO: 0.3 10E9/L (ref 0–0.7)
EOSINOPHIL NFR BLD AUTO: 6.4 %
ERYTHROCYTE [DISTWIDTH] IN BLOOD BY AUTOMATED COUNT: 12.2 % (ref 10–15)
GFR SERPL CREATININE-BSD FRML MDRD: 79 ML/MIN/{1.73_M2}
GLUCOSE SERPL-MCNC: 101 MG/DL (ref 70–99)
HCT VFR BLD AUTO: 36.9 % (ref 40–53)
HGB BLD-MCNC: 11.8 G/DL (ref 13.3–17.7)
IMM GRANULOCYTES # BLD: 0.1 10E9/L (ref 0–0.4)
IMM GRANULOCYTES NFR BLD: 1.3 %
LYMPHOCYTES # BLD AUTO: 1.3 10E9/L (ref 0.8–5.3)
LYMPHOCYTES NFR BLD AUTO: 23.9 %
Lab: ABNORMAL
MCH RBC QN AUTO: 29 PG (ref 26.5–33)
MCHC RBC AUTO-ENTMCNC: 32 G/DL (ref 31.5–36.5)
MCV RBC AUTO: 91 FL (ref 78–100)
MONOCYTES # BLD AUTO: 0.6 10E9/L (ref 0–1.3)
MONOCYTES NFR BLD AUTO: 10.3 %
NEUTROPHILS # BLD AUTO: 3.1 10E9/L (ref 1.6–8.3)
NEUTROPHILS NFR BLD AUTO: 57.5 %
NRBC # BLD AUTO: 0 10*3/UL
NRBC BLD AUTO-RTO: 0 /100
PLATELET # BLD AUTO: 418 10E9/L (ref 150–450)
POTASSIUM SERPL-SCNC: 4.4 MMOL/L (ref 3.4–5.3)
RBC # BLD AUTO: 4.07 10E12/L (ref 4.4–5.9)
SODIUM SERPL-SCNC: 138 MMOL/L (ref 133–144)
SPECIMEN SOURCE: ABNORMAL
WBC # BLD AUTO: 5.3 10E9/L (ref 4–11)

## 2019-09-07 PROCEDURE — 80048 BASIC METABOLIC PNL TOTAL CA: CPT | Performed by: SURGERY

## 2019-09-07 PROCEDURE — 25000132 ZZH RX MED GY IP 250 OP 250 PS 637: Performed by: INTERNAL MEDICINE

## 2019-09-07 PROCEDURE — 25000128 H RX IP 250 OP 636: Performed by: SURGERY

## 2019-09-07 PROCEDURE — 25000128 H RX IP 250 OP 636: Performed by: PHYSICIAN ASSISTANT

## 2019-09-07 PROCEDURE — 36415 COLL VENOUS BLD VENIPUNCTURE: CPT | Performed by: SURGERY

## 2019-09-07 PROCEDURE — 25800029 ZZH RX IP 258 OP 250: Performed by: PHYSICIAN ASSISTANT

## 2019-09-07 PROCEDURE — 85025 COMPLETE CBC W/AUTO DIFF WBC: CPT | Performed by: SURGERY

## 2019-09-07 PROCEDURE — 25000132 ZZH RX MED GY IP 250 OP 250 PS 637: Performed by: SURGERY

## 2019-09-07 PROCEDURE — 25000132 ZZH RX MED GY IP 250 OP 250 PS 637: Performed by: PHYSICIAN ASSISTANT

## 2019-09-07 PROCEDURE — 99231 SBSQ HOSP IP/OBS SF/LOW 25: CPT | Performed by: SURGERY

## 2019-09-07 PROCEDURE — 12000000 ZZH R&B MED SURG/OB

## 2019-09-07 RX ORDER — MEROPENEM 1 G/1
1 INJECTION, POWDER, FOR SOLUTION INTRAVENOUS EVERY 8 HOURS
Status: DISCONTINUED | OUTPATIENT
Start: 2019-09-07 | End: 2019-09-10 | Stop reason: HOSPADM

## 2019-09-07 RX ORDER — FLUCONAZOLE 200 MG/1
400 TABLET ORAL DAILY
Status: DISCONTINUED | OUTPATIENT
Start: 2019-09-07 | End: 2019-09-10 | Stop reason: HOSPADM

## 2019-09-07 RX ADMIN — HYDROMORPHONE HYDROCHLORIDE 0.5 MG: 10 INJECTION, SOLUTION INTRAMUSCULAR; INTRAVENOUS; SUBCUTANEOUS at 14:43

## 2019-09-07 RX ADMIN — OXYCODONE HYDROCHLORIDE 10 MG: 5 TABLET ORAL at 10:10

## 2019-09-07 RX ADMIN — PANTOPRAZOLE SODIUM 20 MG: 20 TABLET, DELAYED RELEASE ORAL at 06:56

## 2019-09-07 RX ADMIN — OXYCODONE HYDROCHLORIDE 10 MG: 5 TABLET ORAL at 04:32

## 2019-09-07 RX ADMIN — HYDROMORPHONE HYDROCHLORIDE 0.5 MG: 10 INJECTION, SOLUTION INTRAMUSCULAR; INTRAVENOUS; SUBCUTANEOUS at 02:48

## 2019-09-07 RX ADMIN — MEROPENEM 1 G: 1 INJECTION, POWDER, FOR SOLUTION INTRAVENOUS at 08:54

## 2019-09-07 RX ADMIN — SODIUM CHLORIDE: 4.5 INJECTION, SOLUTION INTRAVENOUS at 14:42

## 2019-09-07 RX ADMIN — HYDROMORPHONE HYDROCHLORIDE 0.5 MG: 10 INJECTION, SOLUTION INTRAMUSCULAR; INTRAVENOUS; SUBCUTANEOUS at 08:30

## 2019-09-07 RX ADMIN — OXYCODONE HYDROCHLORIDE 10 MG: 5 TABLET ORAL at 20:10

## 2019-09-07 RX ADMIN — FLUCONAZOLE 400 MG: 200 TABLET ORAL at 14:43

## 2019-09-07 RX ADMIN — PIPERACILLIN SODIUM,TAZOBACTAM SODIUM 3.38 G: 3; .375 INJECTION, POWDER, FOR SOLUTION INTRAVENOUS at 01:19

## 2019-09-07 RX ADMIN — OXYCODONE HYDROCHLORIDE 10 MG: 5 TABLET ORAL at 13:02

## 2019-09-07 RX ADMIN — OXYCODONE HYDROCHLORIDE 10 MG: 5 TABLET ORAL at 06:56

## 2019-09-07 RX ADMIN — RANITIDINE 150 MG: 150 TABLET ORAL at 20:16

## 2019-09-07 RX ADMIN — RANITIDINE 150 MG: 150 TABLET ORAL at 10:10

## 2019-09-07 RX ADMIN — CALCIUM CARBONATE (ANTACID) CHEW TAB 500 MG 1000 MG: 500 CHEW TAB at 08:53

## 2019-09-07 RX ADMIN — HYDROMORPHONE HYDROCHLORIDE 0.5 MG: 10 INJECTION, SOLUTION INTRAMUSCULAR; INTRAVENOUS; SUBCUTANEOUS at 05:42

## 2019-09-07 RX ADMIN — SODIUM CHLORIDE: 4.5 INJECTION, SOLUTION INTRAVENOUS at 02:48

## 2019-09-07 RX ADMIN — HYDROMORPHONE HYDROCHLORIDE 0.5 MG: 10 INJECTION, SOLUTION INTRAMUSCULAR; INTRAVENOUS; SUBCUTANEOUS at 18:03

## 2019-09-07 RX ADMIN — CALCIUM CARBONATE (ANTACID) CHEW TAB 500 MG 1000 MG: 500 CHEW TAB at 04:32

## 2019-09-07 RX ADMIN — PIPERACILLIN SODIUM,TAZOBACTAM SODIUM 3.38 G: 3; .375 INJECTION, POWDER, FOR SOLUTION INTRAVENOUS at 06:56

## 2019-09-07 RX ADMIN — HYDROMORPHONE HYDROCHLORIDE 0.5 MG: 10 INJECTION, SOLUTION INTRAMUSCULAR; INTRAVENOUS; SUBCUTANEOUS at 11:17

## 2019-09-07 RX ADMIN — MEROPENEM 1 G: 1 INJECTION, POWDER, FOR SOLUTION INTRAVENOUS at 16:44

## 2019-09-07 RX ADMIN — OXYCODONE HYDROCHLORIDE 10 MG: 5 TABLET ORAL at 01:19

## 2019-09-07 RX ADMIN — OXYCODONE HYDROCHLORIDE 10 MG: 5 TABLET ORAL at 16:43

## 2019-09-07 RX ADMIN — OXYCODONE HYDROCHLORIDE 10 MG: 5 TABLET ORAL at 23:51

## 2019-09-07 ASSESSMENT — ACTIVITIES OF DAILY LIVING (ADL)
ADLS_ACUITY_SCORE: 12

## 2019-09-07 NOTE — CONSULTS
Care Transition Initial Assessment - SW     Met with: PATIENT, Wife    Active Problems:    Diverticulitis       DATA  Lives With: spouse      Quality of Family Relationships: supportive  Description of Support System: Supportive, Involved  Who is your support system?: Wife, Parent(s), Sibling(s)  Support Assessment: Adequate family and caregiver support, Adequate social supports.   Identified issues/concerns regarding health management:  Concerns about pt experiencing depression.      Quality of Family Relationships: supportive       ASSESSMENT  Concerns to be addressed: Concerns about pt experiencing depression.  SW met with pt and his wife. Pt explained that he has been in a lot of pain and had a few bad nights. Pt is now feeling better and is hopeful about the medical treatment and pain management. Pt also explained that he has experienced a lot of stress at work and has been dealing with the fact that their two children (2 & 3 yo) are away with family in [place] (pt is [place] the [place] for [place]). Pt reported that has has been diagnosed with anxiety in the past and was taking Lorazepam about 4 years ago. Pt reported that he stopped taking Lorazepam because he didn't think they were helping. Pt is establishing primary care at [facility] and is open to restarting Lorazapam in the future. Pt also has found relief with staying active, date nights with his wife and riding his motorcycle. Pt and his wife do not have any family here in the [facility] but has the support of his brother in [place] and his family in [place].  SW encouraged pt to connect with his supports and follow up with his primary if he wanted to consider a mood stabilizer.     PLAN  Patient anticipates discharging to:  Home.     CABREAR Yoo   Casual SW [phone]

## 2019-09-07 NOTE — CONSULTS
CTS consulted to assist in arranging support and addressing possible depression. SW consult placed for psychosocial eval, see documentation. Pt is working to establish care at the [facility] but declines assistance in scheduling a hospital follow-up appointment. No handoff sent as pt is not yet established w/ provider.     Will clear consult, please call or place new consult if further discharge needs are identified.     Sue Bourne RN BSN CTS   [phone]  Care Transitions Team  [facility]

## 2019-09-07 NOTE — PLAN OF CARE
Pt A/Ox4, VSS, afebrile, RA, c/o left lower abdominal pain 5/10- PRN oxy given x2 and PRN IV dilaudid given x1, LS- clear, +BS, IVF 1/2 NS @ 100ml/hr, tolerating full liquid diet, SBA, KARIN flushed per orders with a total of 10 ml output, pt calls appropriately, wife at beside, continue POC.

## 2019-09-07 NOTE — PLAN OF CARE
Vitals are Temp: 98  F (36.7  C) Temp src: Oral BP: 124/81 Pulse: 82 Heart Rate: 76 Resp: 20 SpO2: 95 %.  Patient is Alert and Oriented x4. They are SBA with no assistive devices .  Pt is a Full liquid diet.  They are complaining of 6-7/10 pain in their left lower abdomen and back.  Dilaudid and Oxycodone given for pain.  Medications decreased pain. Did educate patient of the use of IV narcotics and encouraged heat, ice and repositioning but patient declined.  Patient has Normal Saline 0.45% running at 100 mL per hour. Zosyn q6 hr. Care Coordinator consult implemented to better assist patient to assess what ancillary health care professional is able to help him w/ his depression. KARIN drain bulb to suction w/ tan/brown fluid. Dressing CDI. Discharge home w/ wife in 1-2 days.   Continue POC.

## 2019-09-07 NOTE — PROGRESS NOTES
Surgery-[facility]  Admission for diverticulitis with abscess, s/p drainage  Culture shows e coli resistant to Zosyn  Feels much better than at admission, only significant pain is at drain site. Denies nausea but has no appetite. Some reflux, normally takes nexium at home  WBC 5.3K  97.9 P69  60mL from drain  NAD, comfortable, non-toxic  Abd soft, rounded though he states this is his norm; some LLQ fullness without focal tenderness, drain bulb is serous with some debris  Improving  Okay for low residue diet  Change abx to meropenem; ID consult for abx selection and duration  Continue drain, possible re-imaging tomorrow  Walk  Anticipate single stage procedure in a few weeks; exact timing per Dr Barrios.

## 2019-09-07 NOTE — CONSULTS
Note INFECTIOUS DISEASES CONSULTATION NOTE  Covering for Jarad Vera & Chet     Date 2019     Name /  Jim Mixon   1965     MRN [id number]     Thank you for asking us to see Jim Mixon for infectious diseases evaluation    REASON FOR CONSULT diverticular abscess, resistant e coli. assist with antibiotic selection and duration  REQUESTING PROVIDER Dr Conway  CHIEF COMPLAINT    abdl pain / abscess    HPI    8.19.019   CT Extensive sigmoid diverticulitis.   There is contained fluid within the wall of the sigmoid as well as immediately adjacent to the bowel suspicious for abscess formation  9.4 CT  multiple peridiverticular abscesses not better => ADMIT   Percutaneous drainage   ID consult for antibiotic rec  Culture Enterococcus  Yeast  E coli R to amp, amp-sulb, & pip-carlos (S to cephalosporins, FQs, TMP-S)  ROS  Feeling better  Was able to eat some food today  No diarrhea  No fever  No new shortness of breath  Recalls cipro allergy ...    Rest of 16 pt ROS neg      OTHER HISTORY  PFSH  Past Medical History:   Diagnosis Date     Anxiety      Diverticulitis      GERD (gastroesophageal reflux disease)      Hypertension      Past Surgical History:   Procedure Laterality Date     CHOLECYSTECTOMY  2004      Problem (# of Occurrences) Relation (Name,Age of Onset)    Diabetes (2) Father, Maternal Grandmother        Family History   Problem Relation Age of Onset     Diabetes Father      Diabetes Maternal Grandmother      Social History     Socioeconomic History     Marital status:      Spouse name: None     Number of children: None     Years of education: None     Highest education level: None   Occupational History     None   Social Needs     Financial resource strain: None     Food insecurity:     Worry: None     Inability: None     Transportation needs:     Medical: None     Non-medical: None   Tobacco Use     Smoking status: Never Smoker     Smokeless tobacco: Never  Used   Substance and Sexual Activity     Alcohol use: Not Currently     Drug use: Never     Sexual activity: None   Lifestyle     Physical activity:     Days per week: None     Minutes per session: None     Stress: None   Relationships     Social connections:     Talks on phone: None     Gets together: None     Attends Islam service: None     Active member of club or organization: None     Attends meetings of clubs or organizations: None     Relationship status: None     Intimate partner violence:     Fear of current or ex partner: None     Emotionally abused: None     Physically abused: None     Forced sexual activity: None   Other Topics Concern     None   Social History Narrative     None     Allergies   Allergen Reactions     Ciprofloxacin        There is no immunization history on file for this patient.  EXAM  /88 (BP Location: Left arm)   Pulse 82   Temp 97.9  F (36.6  C) (Oral)   Resp 18   SpO2 97%     const   In bed  no acute distress     lungs   clear     CV   RRR     Abd   Mild distension  Soft  Minimal tenderness  (+) sounds     skin   No rash  Some tattoos present     neuro   Animated discussion  Pleasant  Smiling  Moves all limbs     extrem   C/w age & gender   psyche   calm, coherent, cooperative, appropriate        DATA  Cultures      LABS  Recent Labs   Lab Test 09/07/19  0748 09/05/19  0641  08/19/19  0756   WBC 5.3 10.6   < > 11.4*   AST  --   --   --  28   ALT  --   --   --  52   BILITOTAL  --   --   --  0.8   ALKPHOS  --   --   --  90    < > = values in this interval not displayed.           ASSESSMENT   SUGGESTIONS    (B37.9) Yeast infection  (primary encounter diagnosis)  (A49.8) Escherichia coli (E. coli) infection  (K65.1) Intra-abdominal abscess (H)  (K57.92) Diverticulitis    Doing well  Yeast noted  Meropenem should cover bacteria, will add fluconazole to cover yeast  Await repeat CT, if source control achieved, likely 5 days antibiotics sufficient (per Stop It trial)  If  discharged to home still on antibiotics, po fluconazole + ceftin + metronidazole would be good.         Christopher Lepe MD  Covering for Drs Chet & Chuy  Infectious Disease service  Current Meds Reviewed    Current Facility-Administered Medications   Medication     0.45 % sodium chloride IV solution      acetaminophen (TYLENOL) Suppository 650 mg     acetaminophen (TYLENOL) tablet 650 mg     calcium carbonate (TUMS) chewable tablet 1,000 mg     fluconazole (DIFLUCAN) tablet 400 mg     HYDROmorphone (PF) (DILAUDID) injection 0.3-0.5 mg     ibuprofen (ADVIL/MOTRIN) tablet 600 mg     lidocaine (LMX4) cream     lidocaine 1 % 0.1-1 mL     LORazepam (ATIVAN) tablet 1 mg     melatonin tablet 1 mg     meropenem (MERREM) 1 g vial to attach to  mL bag     naloxone (NARCAN) injection 0.1-0.4 mg     ondansetron (ZOFRAN-ODT) ODT tab 4 mg    Or     ondansetron (ZOFRAN) injection 4 mg     oxyCODONE (ROXICODONE) tablet 5-10 mg     pantoprazole (PROTONIX) EC tablet 20 mg     ranitidine (ZANTAC) tablet 150 mg     sodium chloride (PF) 0.9% PF flush 10 mL     sodium chloride (PF) 0.9% PF flush 3 mL     sodium chloride (PF) 0.9% PF flush 3 mL

## 2019-09-07 NOTE — PLAN OF CARE
Pain: Has c/o abdominal pain at KARIN drain site.  Alternating Dilaudid and Oxycodone for pain control.  LOC: alert and oriented  Mobility: SBA.  Ambulate in hallway.  Lungs: clear.  97% RA  Tele: No tele  GI: Diet advanced to low fiber.  KARIN drain left abdomen draining brown/tan fluid. Pt reports having a soft,formed BM today.  : voiding without difficulty  IV: PIV infusing per orders  Other: Continue IV Merrem and PRN pain control per orders.  Social work, Surgery and ID following.  Spiritual health to visit with pt.

## 2019-09-08 ENCOUNTER — APPOINTMENT (OUTPATIENT)
Dept: CT IMAGING | Facility: CLINIC | Age: 54
DRG: 392 | End: 2019-09-08
Attending: SURGERY
Payer: COMMERCIAL

## 2019-09-08 LAB
BACTERIA SPEC CULT: ABNORMAL
SPECIMEN SOURCE: ABNORMAL

## 2019-09-08 PROCEDURE — 25000128 H RX IP 250 OP 636: Performed by: PHYSICIAN ASSISTANT

## 2019-09-08 PROCEDURE — 25000125 ZZHC RX 250: Performed by: SURGERY

## 2019-09-08 PROCEDURE — 25800029 ZZH RX IP 258 OP 250: Performed by: PHYSICIAN ASSISTANT

## 2019-09-08 PROCEDURE — 25000132 ZZH RX MED GY IP 250 OP 250 PS 637: Performed by: PHYSICIAN ASSISTANT

## 2019-09-08 PROCEDURE — 74177 CT ABD & PELVIS W/CONTRAST: CPT

## 2019-09-08 PROCEDURE — 25000132 ZZH RX MED GY IP 250 OP 250 PS 637: Performed by: SURGERY

## 2019-09-08 PROCEDURE — 12000000 ZZH R&B MED SURG/OB

## 2019-09-08 PROCEDURE — 25000132 ZZH RX MED GY IP 250 OP 250 PS 637: Performed by: INTERNAL MEDICINE

## 2019-09-08 PROCEDURE — 25000128 H RX IP 250 OP 636: Performed by: SURGERY

## 2019-09-08 PROCEDURE — 99231 SBSQ HOSP IP/OBS SF/LOW 25: CPT | Performed by: SURGERY

## 2019-09-08 RX ORDER — IOPAMIDOL 755 MG/ML
500 INJECTION, SOLUTION INTRAVASCULAR ONCE
Status: COMPLETED | OUTPATIENT
Start: 2019-09-08 | End: 2019-09-08

## 2019-09-08 RX ADMIN — OXYCODONE HYDROCHLORIDE 10 MG: 5 TABLET ORAL at 16:10

## 2019-09-08 RX ADMIN — SODIUM CHLORIDE: 4.5 INJECTION, SOLUTION INTRAVENOUS at 02:04

## 2019-09-08 RX ADMIN — CALCIUM CARBONATE (ANTACID) CHEW TAB 500 MG 1000 MG: 500 CHEW TAB at 16:34

## 2019-09-08 RX ADMIN — PANTOPRAZOLE SODIUM 20 MG: 20 TABLET, DELAYED RELEASE ORAL at 06:02

## 2019-09-08 RX ADMIN — RANITIDINE 150 MG: 150 TABLET ORAL at 08:47

## 2019-09-08 RX ADMIN — FLUCONAZOLE 400 MG: 200 TABLET ORAL at 08:48

## 2019-09-08 RX ADMIN — MEROPENEM 1 G: 1 INJECTION, POWDER, FOR SOLUTION INTRAVENOUS at 08:48

## 2019-09-08 RX ADMIN — RANITIDINE 150 MG: 150 TABLET ORAL at 20:15

## 2019-09-08 RX ADMIN — SODIUM CHLORIDE 61 ML: 9 INJECTION, SOLUTION INTRAVENOUS at 11:29

## 2019-09-08 RX ADMIN — OXYCODONE HYDROCHLORIDE 10 MG: 5 TABLET ORAL at 20:15

## 2019-09-08 RX ADMIN — OXYCODONE HYDROCHLORIDE 10 MG: 5 TABLET ORAL at 22:37

## 2019-09-08 RX ADMIN — OXYCODONE HYDROCHLORIDE 10 MG: 5 TABLET ORAL at 09:04

## 2019-09-08 RX ADMIN — MEROPENEM 1 G: 1 INJECTION, POWDER, FOR SOLUTION INTRAVENOUS at 16:34

## 2019-09-08 RX ADMIN — SODIUM CHLORIDE: 4.5 INJECTION, SOLUTION INTRAVENOUS at 20:16

## 2019-09-08 RX ADMIN — HYDROMORPHONE HYDROCHLORIDE 0.5 MG: 10 INJECTION, SOLUTION INTRAMUSCULAR; INTRAVENOUS; SUBCUTANEOUS at 18:19

## 2019-09-08 RX ADMIN — HYDROMORPHONE HYDROCHLORIDE 0.5 MG: 10 INJECTION, SOLUTION INTRAMUSCULAR; INTRAVENOUS; SUBCUTANEOUS at 06:56

## 2019-09-08 RX ADMIN — MEROPENEM 1 G: 1 INJECTION, POWDER, FOR SOLUTION INTRAVENOUS at 01:23

## 2019-09-08 RX ADMIN — IOPAMIDOL 85 ML: 755 INJECTION, SOLUTION INTRAVENOUS at 11:28

## 2019-09-08 RX ADMIN — OXYCODONE HYDROCHLORIDE 10 MG: 5 TABLET ORAL at 03:06

## 2019-09-08 RX ADMIN — HYDROMORPHONE HYDROCHLORIDE 0.5 MG: 10 INJECTION, SOLUTION INTRAMUSCULAR; INTRAVENOUS; SUBCUTANEOUS at 01:23

## 2019-09-08 RX ADMIN — OXYCODONE HYDROCHLORIDE 10 MG: 5 TABLET ORAL at 06:02

## 2019-09-08 ASSESSMENT — ACTIVITIES OF DAILY LIVING (ADL)
ADLS_ACUITY_SCORE: 12

## 2019-09-08 NOTE — PROGRESS NOTES
Note Infectious Disease Consult Service Progress Note  Covering for Jarad Rosenberg & Chuy   Pt Name Jim Mixon   Date 09/08/2019   MRN [id number]     Notes / labs / imaging test results and other data were reviewed    CHIEF COMPLAINT: REASON FOR VISIT    f/u    HPI    8.19.19  CT  Extensive sigmoid diverticulitis.   There is contained fluid within the wall of the sigmoid as well as immediately adjacent to the bowel suspicious for abscess formation  9.4   CT   multiple peridiverticular abscesses not better => ADMIT  9/5  Percutaneous drainage      ROS    nill new    Remainder of 14-point ROS was negative except as listed above    PFSH:  Personal / Family / Social Histories were reviewed and updated  nill  new  Vital Signs: /89 (BP Location: Left arm)   Pulse 82   Temp 98.6  F (37  C) (Oral)   Resp 18   SpO2 93%       Data  Cultures    9/5 Abscess  E coli   +   E faecalis   +   Candida        LABS  Lab Results   Component Value Date/Time    WBC 5.3 09/07/2019 07:48 AM    WBC 10.6 09/05/2019 06:41 AM    WBC 11.0 09/04/2019 07:13 PM    WBC 10.5 08/22/2019 07:10 AM    AST 28 08/19/2019 07:56 AM    ALT 52 08/19/2019 07:56 AM    ALKPHOS 90 08/19/2019 07:56 AM           ASSESSMENT & SUGGESTIONS    (B37.9) Yeast infection  (primary encounter diagnosis)  (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 coli) infection  (K65.1) Intra-abdominal abscess (H)  (K57.92) Diverticulitis    If home on atbx, rec  TMP-S (for E coli) + Amox-clav (Enterococcus & anaerobes) + fluconazole (yeast)  1. TMP-S one DS BID  2. amox-clav 875 mg BID  3. fluconazole 400 mg daily  Timing repeat imaging per surgery             Christopher Lepe MD  Covering for Jarad Rosenberg & Chuy  Infectious Disease service    CURRENT MED REVIEWD  Current Facility-Administered Medications   Medication     0.45 % sodium chloride IV solution      acetaminophen (TYLENOL) Suppository 650 mg      acetaminophen (TYLENOL) tablet 650 mg     calcium carbonate (TUMS) chewable tablet 1,000 mg     fluconazole (DIFLUCAN) tablet 400 mg     HYDROmorphone (PF) (DILAUDID) injection 0.3-0.5 mg     ibuprofen (ADVIL/MOTRIN) tablet 600 mg     lidocaine (LMX4) cream     lidocaine 1 % 0.1-1 mL     LORazepam (ATIVAN) tablet 1 mg     melatonin tablet 1 mg     meropenem (MERREM) 1 g vial to attach to  mL bag     naloxone (NARCAN) injection 0.1-0.4 mg     ondansetron (ZOFRAN-ODT) ODT tab 4 mg    Or     ondansetron (ZOFRAN) injection 4 mg     oxyCODONE (ROXICODONE) tablet 5-10 mg     pantoprazole (PROTONIX) EC tablet 20 mg     ranitidine (ZANTAC) tablet 150 mg     sodium chloride (PF) 0.9% PF flush 10 mL     sodium chloride (PF) 0.9% PF flush 3 mL     sodium chloride (PF) 0.9% PF flush 3 mL

## 2019-09-08 NOTE — PLAN OF CARE
A/O. Family at bedside.  Frequent c/o pain at KARIN Insertion site.   Oxycodone and Dilaudid given.  Drainage tan/gr.   Appetite good. Independent in room.  LS clear.

## 2019-09-08 NOTE — PLAN OF CARE
A&Ox4. /90 otherwise VSS. Continues to report high levels of abdominal pain; given oxycodone and dilaudid. Up independently in the room. Wife at bedside. Continues on meropenem Q8H and 1/2NS @ 100 mL/hr. Tolerating low fiber diet. KARIN drain site dressing CDI. Continues to drain greenish liquid. Will continue to monitor.

## 2019-09-08 NOTE — PLAN OF CARE
Pain: Abdominal pain at KARIN drain site much better today.  PRN oxycodone x1 given today for pain  LOC: alert and oriented  Mobility: Independent. Ambulated in hallway with wife.  Lungs: clear.  93% RA  Tele: No tele  GI: Diet advanced to low fiber.  KARIN drain left abdomen draining brown/tan fluid.   : voiding without difficulty  IV: PIV infusing per orders  Other: Continue IV Merrem and PRN pain control per orders.  Social work, Surgery and ID following.  Spiritual health to visit with pt.  Follow up abdominal CT scan done today.

## 2019-09-08 NOTE — PROGRESS NOTES
Surgery-[place]  Admission for diverticulitis with abscess, s/p IR drainage  In addition to culture data from yesterday AM, new growth of candida noted  Feels the diverticulitis pain has resolved, mostly has discomfort at insertion/drain site.  Tolerated a diet yesterday  98.6 P77  38mL from KARIN  NAD, up walking  Abd soft, mild LLQ tenderness without guarding, bowel tones normal, IR drain bulb with green tinged serous fluid with turbid material in tubing.  Doing well, appears we are getting ahead of the infection.  Continue low residue diet.  Continue meropenem, diflucan  Appreciate ID input; would favor continuing abx at discharge as source control in this case would include sigmoid resection (in regards to STOP-IT trial)  Repeat CT to evaluate for completeness of abscess drainage  Timing of surgery per Dr Barrios

## 2019-09-08 NOTE — PROGRESS NOTES
"SPIRITUAL HEALTH SERVICES Progress Note  [facility] Med. Surg. 3    Saw pt Abelardo in response to a SHS consult.  His spouse Marietta Smith was present.  Abelardo's first language is Salvadorean, but he understands and speaks English well; his wife, less so.  Oriented them to  services.  Abelardo acknowledged the availability of  support but expressed no needs at this time.  He reported that he has been well support by the medical team and added, \"If I want a deeper conversation with you, I will let the nurse know.\"  Their family and friends live in [place], and their children are currently there, visiting Marietta Smith's grandmother.  Abelardo shared that their spirituality is focused more on living a moral life.      This author and other chaplains remain available per pt/family request.    Andrea Leach M.Div., [facility]  Staff   Pager [phone]    "

## 2019-09-09 PROCEDURE — 25800029 ZZH RX IP 258 OP 250: Performed by: PHYSICIAN ASSISTANT

## 2019-09-09 PROCEDURE — 25000132 ZZH RX MED GY IP 250 OP 250 PS 637: Performed by: INTERNAL MEDICINE

## 2019-09-09 PROCEDURE — 12000000 ZZH R&B MED SURG/OB

## 2019-09-09 PROCEDURE — 25000132 ZZH RX MED GY IP 250 OP 250 PS 637: Performed by: SURGERY

## 2019-09-09 PROCEDURE — 99231 SBSQ HOSP IP/OBS SF/LOW 25: CPT | Performed by: SURGERY

## 2019-09-09 PROCEDURE — 25000128 H RX IP 250 OP 636: Performed by: PHYSICIAN ASSISTANT

## 2019-09-09 PROCEDURE — 25000132 ZZH RX MED GY IP 250 OP 250 PS 637: Performed by: PHYSICIAN ASSISTANT

## 2019-09-09 PROCEDURE — 25000128 H RX IP 250 OP 636: Performed by: SURGERY

## 2019-09-09 RX ADMIN — MEROPENEM 1 G: 1 INJECTION, POWDER, FOR SOLUTION INTRAVENOUS at 09:22

## 2019-09-09 RX ADMIN — OXYCODONE HYDROCHLORIDE 10 MG: 5 TABLET ORAL at 15:41

## 2019-09-09 RX ADMIN — PANTOPRAZOLE SODIUM 20 MG: 20 TABLET, DELAYED RELEASE ORAL at 06:00

## 2019-09-09 RX ADMIN — SODIUM CHLORIDE: 4.5 INJECTION, SOLUTION INTRAVENOUS at 11:12

## 2019-09-09 RX ADMIN — HYDROMORPHONE HYDROCHLORIDE 0.5 MG: 10 INJECTION, SOLUTION INTRAMUSCULAR; INTRAVENOUS; SUBCUTANEOUS at 13:46

## 2019-09-09 RX ADMIN — OXYCODONE HYDROCHLORIDE 10 MG: 5 TABLET ORAL at 01:38

## 2019-09-09 RX ADMIN — RANITIDINE 150 MG: 150 TABLET ORAL at 09:21

## 2019-09-09 RX ADMIN — ACETAMINOPHEN 650 MG: 325 TABLET ORAL at 12:45

## 2019-09-09 RX ADMIN — OXYCODONE HYDROCHLORIDE 10 MG: 5 TABLET ORAL at 12:42

## 2019-09-09 RX ADMIN — MEROPENEM 1 G: 1 INJECTION, POWDER, FOR SOLUTION INTRAVENOUS at 16:46

## 2019-09-09 RX ADMIN — RANITIDINE 150 MG: 150 TABLET ORAL at 21:27

## 2019-09-09 RX ADMIN — MEROPENEM 1 G: 1 INJECTION, POWDER, FOR SOLUTION INTRAVENOUS at 01:38

## 2019-09-09 RX ADMIN — SODIUM CHLORIDE: 4.5 INJECTION, SOLUTION INTRAVENOUS at 06:01

## 2019-09-09 RX ADMIN — OXYCODONE HYDROCHLORIDE 10 MG: 5 TABLET ORAL at 06:00

## 2019-09-09 RX ADMIN — HYDROMORPHONE HYDROCHLORIDE 0.5 MG: 10 INJECTION, SOLUTION INTRAMUSCULAR; INTRAVENOUS; SUBCUTANEOUS at 11:12

## 2019-09-09 RX ADMIN — OXYCODONE HYDROCHLORIDE 10 MG: 5 TABLET ORAL at 09:21

## 2019-09-09 RX ADMIN — FLUCONAZOLE 400 MG: 200 TABLET ORAL at 09:21

## 2019-09-09 RX ADMIN — OXYCODONE HYDROCHLORIDE 10 MG: 5 TABLET ORAL at 22:33

## 2019-09-09 ASSESSMENT — ACTIVITIES OF DAILY LIVING (ADL)
ADLS_ACUITY_SCORE: 12

## 2019-09-09 NOTE — PLAN OF CARE
A/O Independent in room.  Appetite good.  C/O pain, Dilaudid and Oxycodone given.  LS clear.  VSS afebrile.

## 2019-09-09 NOTE — PLAN OF CARE
A&Ox4. VSS. Continues to have moderate levels of LLQ pain; given oxycodone x2. Continues on 1/2NS @ 100 mL/hr, IV meropenem Q8H, and oral fluconazole. Tolerating low fiber diet. KARIN drain yielded 2 mL of tan output. Up independently in the room. Slept well through night. Will continue to monitor.

## 2019-09-09 NOTE — PROGRESS NOTES
[facility]  General Surgery Progress Note           Assessment and Plan:   Assessment:   C/S for complicated diverticulitis with abscess, s/p IR drain placement  Afebrile  9/8/2019 CT with drain adequately controlling abscess in that location. Stable intramural abscess and inflammation      Plan:   -Continue IV antibiotics: meropenem, diflucan. ID following, appreciate input  -Low residue diet as tolerated  -GI prophylaxis: protonix, ranitidine   -Pain control: oxycodone PRN, Dilaudid PRN  -Activity as tolerated  -Dispo: Continue drain for now. Timing of discharge TBD, will discuss with Dr. Barrios. Appreciate antibiotic recommendations for discharge (Bactrim, Augmentin, fluconazole)         Interval History:   Comfortable in bed. Reports pain is well controlled with medication. Primary complaint is irritation at drain site. Tolerating low residue diet, +flatus, +BM. Up walking. Voiding independently.        Physical Exam:   Blood pressure 136/88, pulse 82, temperature 97  F (36.1  C), temperature source Oral, resp. rate 20, SpO2 97 %.    I/O last 3 completed shifts:  In: 3670 [P.O.:1525; I.V.:2115; Other:30]  Out: 27 [Drains:27]    Abdomen:   Soft, distended, tender in LLQ, no masses palpated, +BS  IR drain:  Turbid fluid in bulb and tubing          Data:     Recent Labs   Lab 09/07/19  0748 09/05/19  0641 09/04/19 1913   WBC 5.3 10.6 11.0   HGB 11.8* 12.9* 12.8*   HCT 36.9* 39.7* 39.3*   MCV 91 90 91    479* 458*     Recent Labs   Lab 09/07/19  0748 09/04/19 1913    134   POTASSIUM 4.4 4.4   CHLORIDE 105 101   CO2 30 29   ANIONGAP 3 4   * 138*   BUN 8 10   CR 1.05 1.01   GFRESTIMATED 79 83   GFRESTBLACK >90 >90   JOSEFINA 9.5 9.3     CT ABDOMEN AND PELVIS WITH CONTRAST   9/8/2019 11:37 AM  IMPRESSION:  1. Sigmoid diverticulitis. New elongated fluid collection that appears  intramural within the posterior wall of the proximal sigmoid colon  worrisome for intramural new abscess.  2.  Stable position of the left abdominal drain. Trace fluid surrounds  the drain tip. A few extraluminal small bubbles of gas are also  nearby.  3. Slightly more prominent elongated potential complex fluid versus  inflammation extending from the sigmoid diverticulitis and contacting  the left lower quadrant abdominal wall. Cannot exclude a developing  fistula in this region.    Rosy Rush PA-C       Addendum  Pt seen. Was feeling great over the weekend. When drain was flushed today had much worse pain.   Reviewed CT. Radiology report reads new intramural fluid collection however on my review, it appears similar to prior. Drain is in good position. Inflammation around sigmoid remains.   Discussed with patient will continue course for now.   Will continue drain flushes as ordered by IR but decrease to 5ml as pt has pain w flush this morning. Having decent output from drain after flushed.  Hopeful we can continue to treat conservatively for now with goal of sigmoidectomy without an ostomy in the coming weeks. Drain will stay in place on discharge.  If worsening pain, new fevers, leukocytosis will have to re-evaluate and consider more urgent resection with likely ostomy  Jaimee Barrios MD

## 2019-09-09 NOTE — PLAN OF CARE
Patient had increased pain this morning, relief throughout the day with oxycodone and IV dilaudid. Poor PO intake, improving through the evening. No appetite. Afebrile. KARIN drain in left abd flushed per orders; unable to tolerate 10cc's, administered 8cc then stopped. Orders changed for further flushes, patient able to tolerate without difficulty. Educated patient and wife about KARIN flushing and draining. Fluid tan and thin. Ambulated in room. Pt stated unable to tolerate walking in the halls due to pain. ID following; On Merrem and Diflucan. Plan will be for patient to discharge with the drain, when able.

## 2019-09-09 NOTE — PROGRESS NOTES
[facility]  Infectious Disease Progress Note          Assessment and Plan:   IMP 1 55 yo male complicated sigmoid divertulitis, abscess drain in , cx anaerobes/enterococcus?Calbicans/Ecoli    REC 1 IV while here, po septra DS bid, augmentin 875 bid and diflucan 200 daily 2 weeks if disposition, possibly longer if persistent collection        Interval History:   no new complaints and doing well; no cp, sob, n/v/d, some more abd pain today. cx as above              Medications:       fluconazole  400 mg Oral Daily     meropenem  1 g Intravenous Q8H     pantoprazole  20 mg Oral QAM AC     ranitidine  150 mg Oral BID     sodium chloride (PF)  3 mL Intracatheter Q8H     sodium chloride (PF)  5 mL Irrigation Q8H                  Physical Exam:   Blood pressure (!) 156/94, pulse 82, temperature 99.1  F (37.3  C), temperature source Oral, resp. rate 16, SpO2 93 %.  Wt Readings from Last 2 Encounters:   09/04/19 76.7 kg (169 lb)   09/04/19 76.7 kg (169 lb)     Vital Signs with Ranges  Temp:  [97  F (36.1  C)-99.1  F (37.3  C)] 99.1  F (37.3  C)  Heart Rate:  [68-88] 88  Resp:  [16-20] 16  BP: (136-165)/(88-98) 156/94  SpO2:  [93 %-97 %] 93 %    Constitutional: Awake, alert, cooperative, no apparent distress   Lungs: Clear to auscultation bilaterally, no crackles or wheezing   Cardiovascular: Regular rate and rhythm, normal S1 and S2, and no murmur noted   Abdomen: Normal bowel sounds, soft, non-distended,mildly tender   Skin: No rashes, no cyanosis, no edema   Other:           Data:   All microbiology laboratory data reviewed.  Recent Labs   Lab Test 09/07/19  0748 09/05/19  0641 09/04/19 1913   WBC 5.3 10.6 11.0   HGB 11.8* 12.9* 12.8*   HCT 36.9* 39.7* 39.3*   MCV 91 90 91    479* 458*     Recent Labs   Lab Test 09/07/19  0748 09/04/19 1913 08/21/19  0727   CR 1.05 1.01 0.96     No lab results found.  Recent Labs   Lab Test 09/05/19  1610   CULT Heavy growth  Escherichia coli  *  Moderate  growth  Enterococcus faecalis  *  Moderate growth  Candida albicans / dubliniensis  Candida albicans and Candida dubliniensis are not routinely speciated  Susceptibility testing not routinely done  *  Light growth  Mixed gram positive william    Moderate growth  Fusobacterium species  Susceptibility testing not routinely done  *  Heavy growth  Bacteroides thetaiotaomicron  Susceptibility testing not routinely done  *  Heavy growth  Mixed aerobic and anaerobic william  *

## 2019-09-10 VITALS
HEART RATE: 82 BPM | TEMPERATURE: 97.4 F | RESPIRATION RATE: 16 BRPM | SYSTOLIC BLOOD PRESSURE: 122 MMHG | OXYGEN SATURATION: 95 % | DIASTOLIC BLOOD PRESSURE: 81 MMHG

## 2019-09-10 LAB
ERYTHROCYTE [DISTWIDTH] IN BLOOD BY AUTOMATED COUNT: 12.4 % (ref 10–15)
HCT VFR BLD AUTO: 42.4 % (ref 40–53)
HGB BLD-MCNC: 13.5 G/DL (ref 13.3–17.7)
MCH RBC QN AUTO: 29 PG (ref 26.5–33)
MCHC RBC AUTO-ENTMCNC: 31.8 G/DL (ref 31.5–36.5)
MCV RBC AUTO: 91 FL (ref 78–100)
PLATELET # BLD AUTO: 461 10E9/L (ref 150–450)
RBC # BLD AUTO: 4.66 10E12/L (ref 4.4–5.9)
WBC # BLD AUTO: 7.4 10E9/L (ref 4–11)

## 2019-09-10 PROCEDURE — 25000132 ZZH RX MED GY IP 250 OP 250 PS 637: Performed by: SURGERY

## 2019-09-10 PROCEDURE — 25000128 H RX IP 250 OP 636: Performed by: SURGERY

## 2019-09-10 PROCEDURE — 36415 COLL VENOUS BLD VENIPUNCTURE: CPT | Performed by: SURGERY

## 2019-09-10 PROCEDURE — 85027 COMPLETE CBC AUTOMATED: CPT | Performed by: SURGERY

## 2019-09-10 PROCEDURE — 99238 HOSP IP/OBS DSCHRG MGMT 30/<: CPT | Performed by: SURGERY

## 2019-09-10 PROCEDURE — 25800029 ZZH RX IP 258 OP 250: Performed by: SURGERY

## 2019-09-10 PROCEDURE — 25000128 H RX IP 250 OP 636: Performed by: PHYSICIAN ASSISTANT

## 2019-09-10 PROCEDURE — 25000132 ZZH RX MED GY IP 250 OP 250 PS 637: Performed by: INTERNAL MEDICINE

## 2019-09-10 PROCEDURE — 25000132 ZZH RX MED GY IP 250 OP 250 PS 637: Performed by: PHYSICIAN ASSISTANT

## 2019-09-10 RX ORDER — TRAZODONE HYDROCHLORIDE 50 MG/1
50 TABLET, FILM COATED ORAL AT BEDTIME
Qty: 15 TABLET | Refills: 1 | Status: ON HOLD | OUTPATIENT
Start: 2019-09-10 | End: 2019-09-18

## 2019-09-10 RX ORDER — FLUCONAZOLE 200 MG/1
200 TABLET ORAL DAILY
Qty: 14 TABLET | Refills: 0 | Status: ON HOLD | OUTPATIENT
Start: 2019-09-10 | End: 2019-09-25

## 2019-09-10 RX ORDER — SULFAMETHOXAZOLE/TRIMETHOPRIM 800-160 MG
1 TABLET ORAL 2 TIMES DAILY
Qty: 28 TABLET | Refills: 0 | Status: ON HOLD | OUTPATIENT
Start: 2019-09-10 | End: 2019-09-23

## 2019-09-10 RX ORDER — OXYCODONE HYDROCHLORIDE 5 MG/1
5-10 TABLET ORAL EVERY 6 HOURS PRN
Qty: 10 TABLET | Refills: 0 | Status: ON HOLD | OUTPATIENT
Start: 2019-09-10 | End: 2019-09-25

## 2019-09-10 RX ADMIN — OXYCODONE HYDROCHLORIDE 10 MG: 5 TABLET ORAL at 13:02

## 2019-09-10 RX ADMIN — RANITIDINE 150 MG: 150 TABLET ORAL at 09:02

## 2019-09-10 RX ADMIN — FLUCONAZOLE 400 MG: 200 TABLET ORAL at 09:02

## 2019-09-10 RX ADMIN — OXYCODONE HYDROCHLORIDE 10 MG: 5 TABLET ORAL at 09:47

## 2019-09-10 RX ADMIN — PANTOPRAZOLE SODIUM 20 MG: 20 TABLET, DELAYED RELEASE ORAL at 06:44

## 2019-09-10 RX ADMIN — OXYCODONE HYDROCHLORIDE 10 MG: 5 TABLET ORAL at 05:27

## 2019-09-10 RX ADMIN — MEROPENEM 1 G: 1 INJECTION, POWDER, FOR SOLUTION INTRAVENOUS at 09:02

## 2019-09-10 RX ADMIN — OXYCODONE HYDROCHLORIDE 10 MG: 5 TABLET ORAL at 02:09

## 2019-09-10 RX ADMIN — ACETAMINOPHEN 650 MG: 325 TABLET ORAL at 04:36

## 2019-09-10 RX ADMIN — MEROPENEM 1 G: 1 INJECTION, POWDER, FOR SOLUTION INTRAVENOUS at 01:21

## 2019-09-10 RX ADMIN — HYDROMORPHONE HYDROCHLORIDE 0.5 MG: 10 INJECTION, SOLUTION INTRAMUSCULAR; INTRAVENOUS; SUBCUTANEOUS at 00:00

## 2019-09-10 RX ADMIN — SODIUM CHLORIDE: 4.5 INJECTION, SOLUTION INTRAVENOUS at 05:35

## 2019-09-10 ASSESSMENT — ACTIVITIES OF DAILY LIVING (ADL)
ADLS_ACUITY_SCORE: 12

## 2019-09-10 NOTE — PROGRESS NOTES
Patient alert and oriented x4, up independent in room. Pain 4/10 PO pain medication given, decrease in pain. Denies nausea. Tolerating diet. Drain flushed slowly with 5mls, minimal pain reported with flushing. Discharged home this afternoon, wife providing transport. Writer discussed discharge instructions with patient and wife, both declined  to be present. Drain education done, and handouts for home given to patient. Discharge medications given to patient. Patient and wife verbalized understanding of discharge instructions. He will call and schedule a follow up appointment for 2-3 weeks. Patient and wife did not have any further questions or concerns at time of discharge.     1400: More medication came up from discharge pharmacy, patient was called and asked to come to the discharge pharmacy to . Patient will  medication (Trazadone) at discharge pharmacy. Medication sent down to them.

## 2019-09-10 NOTE — DISCHARGE INSTRUCTIONS
"HOME CARE FOLLOWING DIVERTICULITIS ADMISSION  PARRIS Guzman, MAJOR Collins, LISY Mcgovern, SCARLETT Barrios    DRAIN/DRAIN SITE CARE:  If you are discharged from the hospital with a drain in place, monitor and record the output as instructed by your nurse.  Once the output has appropriately decreased, the drain will be removed at your surgeon's office.  Flush drain 3 times per day with 5ml of saline.     BATHING:  If you had a drain in place at any time, avoid baths until 1 week after drain removal.  Showers are okay any time after the drain is out.  You may wash your hair at any time.  Gently pat your incision dry after bathing before replacing a dressing.    ACTIVITY:  Light Activity -- you may immediately be up and about as tolerated.  Driving -- you may drive when comfortable and off narcotic pain medications.  Light Work -- resume when comfortable off pain medications.  (If you can drive, you probably can work.)  Strenuous Work/Activity -- limit lifting to 15 pounds for 1-2 weeks.  Then, progressively increase with time.  Active Sports (running, biking, etc.) -- cautiously resume after 4-6 weeks, or when cleared by your surgeon.    DISCOMFORT:  Use pain medications as prescribed by your surgeon.  Take the pain medication with some food, when possible, to minimize side effects.  Expect gradual improvement.    ANTIBIOTIC THERAPY:  Finish the entire course of antibiotics which have been prescribed.  Contact your surgeon's office if you finish your course of antibiotics and are still feeling any residual abdominal pain or signs of your infection.    DIET:  Continue on a \"soft\" diet (i.e. cooked vegetables, soups, processed meats, light/white bread, mashed potatoes, rice, yogurt) for the first week after discharge from the hospital.  After this time, you may return to diet you were on before surgery.  In general, and specifically while taking pain medications, increase dietary fiber or add a fiber " supplementation like Metamucil or Citrucel to help prevent constipation (this is also a possible side effect of pain medications).  Drink plenty of fluids.    RETURN APPOINTMENT:  Schedule a follow-up visit 2-3 weeks after discharge from the hospital.  Office Phone:  [phone]     CONTACT US IF THE FOLLOWING DEVELOPS:   1. A fever that is above 101     2. If there is a large amount of drainage, bleeding, or swelling.   3. Severe pain that is not relieved by your prescription.   4. Drainage that is thick, cloudy, yellow, green or white.   5. Any other questions not answered by  Frequently Asked Questions  sheet.      FREQUENTLY ASKED QUESTIONS:    Q:  What can I do to minimize constipation (very hard stools, or lack of stools)?  A:  Stay well hydrated.  Increase your dietary fiber intake or take a fiber supplement -with plenty of water.  Walk around frequently.  You may consider an over-the-counter stool-softener.  Your Pharmacist can assist you with choosing one that is stocked at your pharmacy.  Constipation is also one of the most common side effects of pain medication.  If you are using pain medication, be pro-active and try to PREVENT problems with constipation by taking the steps above BEFORE constipation becomes a problem.    Q:  What do I do if I need more pain medications?  A:  Call the office to receive refills.  Be aware that certain pain meds cannot be called into a pharmacy and actually require a paper prescription.  A change may be made in your pain med as you progress thru your recovery period or if you have side effects to certain meds.    --Pain meds are NOT refilled after 5pm on weekdays, and NOT AT ALL on the weekends, so please look ahead to prevent problems.      Q:  Why am I having a hard time sleeping now that I am at home?  A:  Many medications you receive while you are in the hospital can impact your sleep for a number of days after your surgery/hospitalization.  Decreased level of  activity and naps during the day may also make sleeping at night difficult.  Try to minimize day-time naps, and get up frequently during the day to walk around your home during your recovery time.  Sleep aides may be of some help, but are not recommended for long-term use.      Q:  I am having some back discomfort.  What should I do?  A:  This may be related to certain positioning that was required for your surgery, extended periods of time in bed, or other changes in your overall activity level.  You may try ice, heat, acetaminophen, or ibuprofen to treat this temporarily.  Note that many pain medications have acetaminophen in them and would state this on the prescription bottle.  Be sure not to exceed the maximum of 4000mg per day of acetaminophen.     **If the pain you are having does not resolve, is severe, or is a flare of back pain you have had on other occasions prior to surgery, please contact your primary physician for further recommendations or for an appointment to be examined at their office.    Q:  Why am I having headaches?  A:  Headaches can be caused by many things:  caffeine withdrawal, use of pain meds, dehydration, high blood pressure, lack of sleep, over-activity/exhaustion, flare-up of usual migraine headaches.  If you feel this is related to muscle tension (a band-like feeling around the head, or a pressure at the low-back of the head) you may try ice or heat to this area.  You may need to drink more fluids (try electrolyte drink like Gatorade), rest, or take your usual migraine medications.   **If your headaches do not resolve, worsen, are accompanied by other symptoms, or if your blood pressure is high, please call your primary physician for recommendation and/or examination.        If you have other questions, please call the office Monday thru Friday between 8am and 5pm to discuss with the nurse or physician assistant.  [phone]    There is a surgeon ON CALL on weekday evenings and  over the weekend in case of urgent need only, and may be contacted at the same number.    If you are having an emergency, call 911 or proceed to your nearest emergency department.

## 2019-09-10 NOTE — PLAN OF CARE
Pt A/Ox4, VSS, c/o left abdominal pain 5/10- PRN oxy given x1, IVF 1/2 NS @ 50 ml/hr, +BS, tolerating low fiber diet, up indpendent in room, KARIN drain in place with minimal output 5cc total, continues PO diflucan and IV merrem, continue POC

## 2019-09-10 NOTE — PLAN OF CARE
[id number]: Pt slept on and off during the night. Spouse at bedside. VSS. A&O. Pain managed with IV dilaudid, tylenol and oxy. KARIN drain in place irrigated- with no output after subtracting 5cc from the total for my flush.  Transfers ind in room. Will continue to monitor.

## 2019-09-10 NOTE — PROGRESS NOTES
[facility]  General Surgery Progress Note         Assessment and Plan:   Assessment:   C/S for complicated diverticulitis with abscess, s/p IR drain placement  Afebrile  9/8/2019 CT with drain adequately controlling abscess in that location. Stable intramural abscess and inflammation      Plan:   -Continue IV antibiotics: meropenem, diflucan. ID following, appreciate input  -Low residue diet as tolerated  -GI prophylaxis: protonix, ranitidine   -Pain control: oxycodone PRN  -Activity as tolerated  -Dispo: Plan for discharge today. Continue drain after discharge. Follow up CT in about 10-12 days and will see Dr. Barrios in clinic in 2 weeks.   -Rx: Bactrim, Augmentin, fluconazole and oxycodone         Interval History:   Comfortable in bed. Reports pain is well controlled with medication. Primary complaint is irritation at drain site. He says 5mm drain flushes do not cause him pain. Tolerating low residue diet, +flatus, +BM. Up walking. Voiding independently. Feels ready to go home.         Physical Exam:   Blood pressure 122/81, pulse 82, temperature 97.4  F (36.3  C), temperature source Oral, resp. rate 16, SpO2 95 %.    I/O last 3 completed shifts:  In: 3220 [P.O.:750; I.V.:2457; Other:13]  Out: 22 [Drains:22]    Abdomen:   Soft, ?distended,  Mildly tender in LLQ, no masses palpated, +BS  IR drain: Turbid fluid in bulb and tubing          Data:     Recent Labs   Lab 09/10/19  0700 09/07/19  0748 09/05/19  0641   WBC 7.4 5.3 10.6   HGB 13.5 11.8* 12.9*   HCT 42.4 36.9* 39.7*   MCV 91 91 90   * 418 479*     Recent Labs   Lab 09/07/19  0748 09/04/19  1913    134   POTASSIUM 4.4 4.4   CHLORIDE 105 101   CO2 30 29   ANIONGAP 3 4   * 138*   BUN 8 10   CR 1.05 1.01   GFRESTIMATED 79 83   GFRESTBLACK >90 >90   JOSEFINA 9.5 9.3     CT ABDOMEN AND PELVIS WITH CONTRAST   9/8/2019 11:37 AM  IMPRESSION:  1. Sigmoid diverticulitis. New elongated fluid collection that appears  intramural within the  posterior wall of the proximal sigmoid colon  worrisome for intramural new abscess.  2. Stable position of the left abdominal drain. Trace fluid surrounds  the drain tip. A few extraluminal small bubbles of gas are also  nearby.  3. Slightly more prominent elongated potential complex fluid versus  inflammation extending from the sigmoid diverticulitis and contacting  the left lower quadrant abdominal wall. Cannot exclude a developing  fistula in this region.    Atul Merchant PA-C     Seen and agree  Doing well. No pain, james diet, having BMs. Drain output brownish   VSS AF  WBC 7.4  Discharge home today  Follow up in 2 wks w CT scan and will plan surgery date at that time  Pt requested sleep agent for home, sent trazodone to pharmacy  Jaimee Barrios MD

## 2019-09-11 DIAGNOSIS — K57.32 DIVERTICULITIS OF COLON: Primary | ICD-10-CM

## 2019-09-12 NOTE — DISCHARGE SUMMARY
[facility]    Discharge Summary  Surgery    Date of Admission:  9/4/2019  Date of Discharge:  9/10/2019  1:32 PM  Discharging Provider: Atul Merchant PA-C and Jiamee Barrios MD  Discharge Summary Note completed by: Desiree Flores PA-C on 9/12/2019  Date of Service: The patient was personally seen by Discharging Providers on the day of discharge.    Discharge Diagnoses   Active Problems:    Yeast infection    Escherichia coli (E. coli) infection    Intra-abdominal abscess (H)    Diverticulitis      Procedure/Surgery Information   N/A    History of Present Illness   Jim Mixon is a 54 year old male who presented with a history of recurrent sigmoid diverticulitis.  He was admitted on 8/19/2019 with complicated diverticulitis.  CT scan showed a very small amount of free air within the mesentery and stranding consistent with possible early abscess.  He clinically improved with IV antibiotics and bowel rest and was discharged on 8/22/19.  His previous episode of diverticulitis was approximately 5 years ago which occurred after he had his for screening colonoscopy at the age of 49.  Today, he reports increasing LLQ pain in the past 3 days.  CT was performed today and revealed worsening diverticulitis with multiple abscesses.  He is admitted for IR drainage of diverticular abscesses.     Hospital Course   Jim Mixon was admitted on 9/4/2019.  The following problems were addressed during his hospitalization:  Complicated diverticulitis with multiple abscesses.    Remarkable hospital course events: patient received IV Zosyn and underwent CT guided diverticular abscess drainage on 9/5.   Abscess cultures showed E. Coli resistant to Zosyn so antibiotic was changed to Meropenem.  ID was consulted for antibiotic selection and duration.  Cultures showed candida as well and Diflucan was started.  Repeat CT on 9/8 showed drain adequately controlling abscess, and stable intramural  abscess and inflammation.  Patient continued to improve clinically following IR drain placement.  Patient was discharged home with drain in place.    Jim met all criteria for release on 9/10/2019  1:32 PM.  He was afebrile, tolerating diet, pain controlled on PO meds, ambulating well, and had return of bowel function.    Medications discontinued or adjusted during this hospitalization: see discharge med list below.    Antibiotics prescribed at discharge: Bactrim, Augmentin, Fluconazole, Duration: 2 weeks     Imaging study follow up needs:   -Follow up CT in 2 weeks    Discharge Instructions and Follow-Up:  Discharge diet: Regular   Discharge activity: Activity as tolerated   Discharge follow-up: Follow up with Dr. Barrios in 2 weeks after CT completed.  Will schedule surgery date at that time.   Wound/Incision care: Keep IR drain dressing clean/dry.       Desiree Flores PA-C      Discharge Disposition   Discharged to home    Condition at discharge: Good    Pending Results   Final pathology results: N/A  Unresulted Labs Ordered in the Past 30 Days of this Admission     No orders found from 8/5/2019 to 9/5/2019.          Primary Care Physician   [facility]    Consultations This Hospital Stay   CARE COORDINATOR IP CONSULT  [facility] IP CONSULT  INFECTIOUS DISEASES IP CONSULT  SOCIAL WORK IP CONSULT    Discharge Orders   No discharge procedures on file.  Discharge Medications   Discharge Medication List as of 9/10/2019  1:04 PM      START taking these medications    Details   fluconazole (DIFLUCAN) 200 MG tablet Take 1 tablet (200 mg) by mouth daily for 14 days, Disp-14 tablet, R-0, E-Prescribe      sulfamethoxazole-trimethoprim (BACTRIM DS/SEPTRA DS) 800-160 MG tablet Take 1 tablet by mouth 2 times daily for 14 days, Disp-28 tablet, R-0, E-Prescribe      traZODone (DESYREL) 50 MG tablet Take 1 tablet (50 mg) by mouth At Bedtime, Disp-15 tablet, R-1, E-Prescribe         CONTINUE  these medications which have CHANGED    Details   amoxicillin-clavulanate (AUGMENTIN) 875-125 MG tablet Take 1 tablet by mouth 2 times daily for 7 days, Disp-14 tablet, R-0, E-Prescribe      oxyCODONE (ROXICODONE) 5 MG tablet Take 1-2 tablets (5-10 mg) by mouth every 6 hours as needed for moderate to severe pain, Disp-10 tablet, R-0, Local Print         CONTINUE these medications which have NOT CHANGED    Details   acetaminophen (TYLENOL) 500 MG tablet Take 1,000 mg by mouth every 6 hours as needed for mild pain, Historical      esomeprazole (NEXIUM) 20 MG DR capsule Take 20 mg by mouth every morning (before breakfast) Take 30-60 minutes before eating., Historical      LORazepam (ATIVAN) 1 MG tablet Take 1 mg by mouth every 6 hours as needed for anxiety, Historical           Allergies   Allergies   Allergen Reactions     Ciprofloxacin      Data   Most Recent 3 CBC's:  Recent Labs   Lab Test 09/10/19  0700 09/07/19  0748 09/05/19  0641   WBC 7.4 5.3 10.6   HGB 13.5 11.8* 12.9*   MCV 91 91 90   * 418 479*      Most Recent 3 BMP's:  Recent Labs   Lab Test 09/07/19  0748 09/04/19  1913 08/21/19  0727    134 137   POTASSIUM 4.4 4.4 4.4   CHLORIDE 105 101 110*   CO2 30 29 23   BUN 8 10 12   CR 1.05 1.01 0.96   ANIONGAP 3 4 4   JOSEFINA 9.5 9.3 8.7   * 138* 126*     Most Recent 2 LFT's:  Recent Labs   Lab Test 08/19/19  0756   AST 28   ALT 52   ALKPHOS 90   BILITOTAL 0.8     Most Recent INR's and Anticoagulation Dosing History:  Anticoagulation Dose History     Recent Dosing and Labs Latest Ref Rng & Units 9/4/2019    INR 0.86 - 1.14 1.06        Most Recent 3 Troponin's:No lab results found.  Most Recent Cholesterol Panel:No lab results found.  Most Recent 6 Bacteria Isolates From Any Culture (See EPIC Reports for Culture Details):  Recent Labs   Lab Test 09/05/19  1610   CULT Heavy growth  Escherichia coli  *  Moderate growth  Enterococcus faecalis  *  Moderate growth  Candida albicans /  dubliniensis  Candida albicans and Candida dubliniensis are not routinely speciated  Susceptibility testing not routinely done  *  Light growth  Mixed gram positive william    Moderate growth  Fusobacterium species  Susceptibility testing not routinely done  *  Heavy growth  Bacteroides thetaiotaomicron  Susceptibility testing not routinely done  *  Heavy growth  Mixed aerobic and anaerobic william  *     Most Recent TSH, T4 and A1c Labs:No lab results found.  Results for orders placed or performed during the hospital encounter of 09/04/19   CT Abdomen Peritonium Abscess Drainage    Narrative    PROCEDURE:  CT guided drain placement    DATE OF PROCEDURE:  9/5/2019 4:14 PM    OPERATORS:    Jesse Caldwell MD    MEDICATIONS:  3mgm Versed, 150mcg Fentanyl 1% lidocaine subcutaneous    CONTRAST:  None    Dose  DLP: 304 mGy-cm    ESTIMATED BLOOD LOSS:  None    COMPLICATIONS:   None    TECHNIQUE:  CT of the pelvis without IV contrast was performed. Radiation dose for  this scan was reduced using automated exposure control, adjustment of  the mA and/or kV according to patient size, or iterative  reconstruction technique.     Pre-PROCEDURE DIAGNOSIS: Diverticular abscess  POST-PROCEDURE DIAGNOSIS: Same    CLINICAL HISTORY/INDICATION:  54-year-old male with multiple small diverticular abscesses. Presents  for percutaneous drain placement.    PROCEDURE AND FINDINGS:  Following a discussion of the risks, benefits, indications and  alternatives to treatment, appropriate informed consent was obtained.   The patient was brought to the CT suite and placed Supine on the  table. The left lower quadrant was prepped and draped in a sterile  fashion.  A time out was performed per hospital universal protocol  policy to ensure correct patient, site and procedure to be performed.     Limited CT of the pelvis shows a gas containing pericolonic fluid  collection within the left pelvis and a skin entry site was  determined.     Local anesthesia  "was achieved with 1% lidocaine. An 5 Martiniquais Yueh  needle was advanced into the pelvic fluid collection under  intermittent CT guidance. A 0.035\" wire was advanced through the  trocar, the tract serially dilated and an 10 Martiniquais drain was placed.  The drain was attached to a suction bulb and a sterile dressing  applied.    Throughout the procedure, the patient was monitored by a radiology  nurse for cardiac rhythm and oxygen saturation which remained stable.  Total moderate sedation time 15 minutes. The patient tolerated the  procedure well and left radiology in stable condition.      Impression    Impression:  Placement of an 10 Martiniquais left chest diverticular abscess drain as  described above    MARTIN FERNÁNDEZ MD   CT Abdomen Pelvis w Contrast    Narrative    CT ABDOMEN AND PELVIS WITH CONTRAST   9/8/2019 11:37 AM     HISTORY: Abdominal infection (including peritonitis); follow up IR  drain.    TECHNIQUE:  CT abdomen and pelvis with 85 mL Isovue-370 IV. Radiation  dose for this scan was reduced using automated exposure control,  adjustment of the mA and/or kV according to patient size, or iterative  reconstruction technique.    COMPARISON: CT drain placement on 5/20/2019, CT abdomen and pelvis  9/4/2019.    FINDINGS: In the left lower abdomen, a percutaneous drain is  identified on series 2 image 54, stable in position. A few small  bubbles of extraluminal gas seen in an area nearby. There is trace  fluid adjacent to the drain tip approximately 2.1 x 0.5 cm, series 2  image 55. Again seen is severe inflammation at the sigmoid colon left  lower quadrant containing multiple diverticula. There is the new  finding of an elongated fluid collection that appears intramural at  the posterior wall of the sigmoid colon that is approximately 4.3 x 1  0.3 cm, series 2 image 67. There is peripheral enhancement and this  process extends medially to the anterior upper to mid pelvis. There is  also somewhat linear ill-defined " inflammation or fluid with a length  of 3.7 cm extending from the inflamed sigmoid colon towards the left  lower quadrant abdominal wall, series 2 image 69. This appears just  slightly more prominent compared to the CT from 9/4/2019.    No bowel obstruction. No other new fluid collection. Cholecystectomy.  Liver, adrenals, spleen, pancreas, and kidneys appear stable. There  are likely a few small renal cysts that are stable but too small for  characterization.      Impression    IMPRESSION:  1. Sigmoid diverticulitis. New elongated fluid collection that appears  intramural within the posterior wall of the proximal sigmoid colon  worrisome for intramural new abscess.  2. Stable position of the left abdominal drain. Trace fluid surrounds  the drain tip. A few extraluminal small bubbles of gas are also  nearby.  3. Slightly more prominent elongated potential complex fluid versus  inflammation extending from the sigmoid diverticulitis and contacting  the left lower quadrant abdominal wall. Cannot exclude a developing  fistula in this region.    ENRIQUE MANUEL MD

## 2019-09-13 ENCOUNTER — TELEPHONE (OUTPATIENT)
Dept: SURGERY | Facility: CLINIC | Age: 54
End: 2019-09-13

## 2019-09-13 NOTE — TELEPHONE ENCOUNTER
Pt of  with recent hospitalization for diverticulitis with abscess and drain placement. 9/4/19.    Pt calling to report that yesterday he had 4 loose stools and had pain in lower back and groin afterward then resolves within approx 30 min.  He believes this may be related to drinking a lot of milk yesterday. Today he has had one loose stool with pain afterward, but pain has improved in intensity and duration.      He continues on antibiotics Augmentin and Bactrim and fluconazole. He is eating yogurt, but not taking a probiotic supplement. Taking Tylenol and Oxycodone prn for discomfort.     Discussed with Desiree Benoit PA-C.  Pt will add probiotic ( we discussed brand names that he can find OTC)   Pt will monitor and if pain is worsening or becomes severe he will go to ER over the weekend.     Pt verbalizes understanding and agrees with plan.

## 2019-09-16 ENCOUNTER — OFFICE VISIT (OUTPATIENT)
Dept: SURGERY | Facility: CLINIC | Age: 54
End: 2019-09-16
Payer: COMMERCIAL

## 2019-09-16 VITALS
WEIGHT: 169 LBS | RESPIRATION RATE: 16 BRPM | SYSTOLIC BLOOD PRESSURE: 124 MMHG | HEIGHT: 67 IN | DIASTOLIC BLOOD PRESSURE: 76 MMHG | TEMPERATURE: 98 F | HEART RATE: 116 BPM | BODY MASS INDEX: 26.53 KG/M2 | OXYGEN SATURATION: 95 %

## 2019-09-16 DIAGNOSIS — K57.32 DIVERTICULITIS OF COLON: Primary | ICD-10-CM

## 2019-09-16 PROCEDURE — 99213 OFFICE O/P EST LOW 20 MIN: CPT | Performed by: PHYSICIAN ASSISTANT

## 2019-09-16 RX ORDER — OXYCODONE HYDROCHLORIDE 5 MG/1
5 TABLET ORAL EVERY 6 HOURS PRN
Qty: 15 TABLET | Refills: 0 | Status: SHIPPED | OUTPATIENT
Start: 2019-09-16 | End: 2019-09-18

## 2019-09-16 ASSESSMENT — MIFFLIN-ST. JEOR: SCORE: 1560.21

## 2019-09-16 NOTE — PROGRESS NOTES
9/16/2019      Subjective:  Jim Mixon is here for a refill of pain medication. He has complicated sigmoid diverticulitis with abscess and possible fistula. Today he  tells me he is feeling about the same as when he left the hospital although he is concerned about is present situation and would like to move his CT scan and follow up appointment with Dr. Barrios up to this week. He reports he is taking his antibiotics as written and denies any fevers, chills or night sweats. He is monitoring his temperature closely at home. He does report continued abdominal pain and continues to require BID oxycodone and currently needs a refill. His bowels have been regular and loose although he currently reports his last BM was Friday. He is flushing his drain as recommended by IR and continues to get turbid fluid into his drain bulb. He reports his appetite is diminished and he is primary taking in ensure.     Objective:  Abd - soft, distended,ttp in LLQ, no masses palpated  LLQ IR drain: turbid/purulent looking fluid in bulb and tubing. Bandage in tact.     Assessment:  Complicated sigmoid diverticulitis with abscess and possible fistula.  S/p IR drain placement    Plan:  CT scan rescheduled to tomorrow at 3 pm  Follow up appointment with Dr. Barrios moved to Wednesday at 9:30  Continue antibiotics as written          Rosy Rush PA-C    Please route or send letter to:  *None*

## 2019-09-17 ENCOUNTER — HOSPITAL ENCOUNTER (OUTPATIENT)
Dept: CT IMAGING | Facility: CLINIC | Age: 54
Discharge: HOME | End: 2019-09-17
Attending: SURGERY | Admitting: SURGERY
Payer: COMMERCIAL

## 2019-09-17 DIAGNOSIS — K57.32 DIVERTICULITIS OF COLON: ICD-10-CM

## 2019-09-17 PROCEDURE — 25000128 H RX IP 250 OP 636: Performed by: SURGERY

## 2019-09-17 PROCEDURE — 74177 CT ABD & PELVIS W/CONTRAST: CPT

## 2019-09-17 RX ORDER — IOPAMIDOL 755 MG/ML
81 INJECTION, SOLUTION INTRAVASCULAR ONCE
Status: COMPLETED | OUTPATIENT
Start: 2019-09-17 | End: 2019-09-17

## 2019-09-17 RX ADMIN — IOPAMIDOL 81 ML: 755 INJECTION, SOLUTION INTRAVENOUS at 15:13

## 2019-09-18 ENCOUNTER — HOSPITAL ENCOUNTER (INPATIENT)
Facility: CLINIC | Age: 54
LOS: 7 days | Discharge: HOME | DRG: 330 | End: 2019-09-25
Attending: SURGERY | Admitting: SURGERY
Payer: COMMERCIAL

## 2019-09-18 ENCOUNTER — HOSPITAL ENCOUNTER (INPATIENT)
Facility: CLINIC | Age: 54
Setting detail: SURGERY ADMIT
DRG: 330 | End: 2019-09-18
Attending: SURGERY | Admitting: SURGERY
Payer: COMMERCIAL

## 2019-09-18 ENCOUNTER — OFFICE VISIT (OUTPATIENT)
Dept: INTERPRETER SERVICES | Facility: CLINIC | Age: 54
End: 2019-09-18
Payer: COMMERCIAL

## 2019-09-18 ENCOUNTER — OFFICE VISIT (OUTPATIENT)
Dept: SURGERY | Facility: CLINIC | Age: 54
End: 2019-09-18
Payer: COMMERCIAL

## 2019-09-18 VITALS
DIASTOLIC BLOOD PRESSURE: 86 MMHG | HEIGHT: 67 IN | WEIGHT: 169 LBS | BODY MASS INDEX: 26.53 KG/M2 | SYSTOLIC BLOOD PRESSURE: 122 MMHG | OXYGEN SATURATION: 98 % | HEART RATE: 94 BPM | RESPIRATION RATE: 16 BRPM

## 2019-09-18 DIAGNOSIS — K57.32 DIVERTICULITIS OF COLON: ICD-10-CM

## 2019-09-18 DIAGNOSIS — Z90.49 S/P PARTIAL RESECTION OF COLON: Primary | ICD-10-CM

## 2019-09-18 DIAGNOSIS — K57.92 DIVERTICULITIS: ICD-10-CM

## 2019-09-18 DIAGNOSIS — K57.20 DIVERTICULITIS OF LARGE INTESTINE WITH PERFORATION WITHOUT BLEEDING: ICD-10-CM

## 2019-09-18 DIAGNOSIS — A49.8 ESCHERICHIA COLI (E. COLI) INFECTION: ICD-10-CM

## 2019-09-18 LAB
ANION GAP SERPL CALCULATED.3IONS-SCNC: 8 MMOL/L (ref 3–14)
BUN SERPL-MCNC: 23 MG/DL (ref 7–30)
CALCIUM SERPL-MCNC: 10.3 MG/DL (ref 8.5–10.1)
CHLORIDE SERPL-SCNC: 102 MMOL/L (ref 94–109)
CO2 SERPL-SCNC: 23 MMOL/L (ref 20–32)
CREAT SERPL-MCNC: 1.48 MG/DL (ref 0.66–1.25)
ERYTHROCYTE [DISTWIDTH] IN BLOOD BY AUTOMATED COUNT: 12.9 % (ref 10–15)
GFR SERPL CREATININE-BSD FRML MDRD: 52 ML/MIN/{1.73_M2}
GLUCOSE SERPL-MCNC: 105 MG/DL (ref 70–99)
HCT VFR BLD AUTO: 43 % (ref 40–53)
HGB BLD-MCNC: 14.2 G/DL (ref 13.3–17.7)
MAGNESIUM SERPL-MCNC: 1.9 MG/DL (ref 1.6–2.3)
MCH RBC QN AUTO: 29.6 PG (ref 26.5–33)
MCHC RBC AUTO-ENTMCNC: 33 G/DL (ref 31.5–36.5)
MCV RBC AUTO: 90 FL (ref 78–100)
PHOSPHATE SERPL-MCNC: 2.7 MG/DL (ref 2.5–4.5)
PLATELET # BLD AUTO: 459 10E9/L (ref 150–450)
POTASSIUM SERPL-SCNC: 4.8 MMOL/L (ref 3.4–5.3)
RBC # BLD AUTO: 4.79 10E12/L (ref 4.4–5.9)
SODIUM SERPL-SCNC: 133 MMOL/L (ref 133–144)
WBC # BLD AUTO: 14.3 10E9/L (ref 4–11)

## 2019-09-18 PROCEDURE — 84100 ASSAY OF PHOSPHORUS: CPT | Performed by: SURGERY

## 2019-09-18 PROCEDURE — 85027 COMPLETE CBC AUTOMATED: CPT | Performed by: SURGERY

## 2019-09-18 PROCEDURE — 12000000 ZZH R&B MED SURG/OB

## 2019-09-18 PROCEDURE — 25800030 ZZH RX IP 258 OP 636: Performed by: SURGERY

## 2019-09-18 PROCEDURE — 83735 ASSAY OF MAGNESIUM: CPT | Performed by: SURGERY

## 2019-09-18 PROCEDURE — 36415 COLL VENOUS BLD VENIPUNCTURE: CPT | Performed by: SURGERY

## 2019-09-18 PROCEDURE — 25000128 H RX IP 250 OP 636: Performed by: SURGERY

## 2019-09-18 PROCEDURE — 25000125 ZZHC RX 250: Performed by: SURGERY

## 2019-09-18 PROCEDURE — T1013 SIGN LANG/ORAL INTERPRETER: HCPCS | Mod: U3

## 2019-09-18 PROCEDURE — 99213 OFFICE O/P EST LOW 20 MIN: CPT | Performed by: SURGERY

## 2019-09-18 PROCEDURE — 80048 BASIC METABOLIC PNL TOTAL CA: CPT | Performed by: SURGERY

## 2019-09-18 RX ORDER — ONDANSETRON 4 MG/1
4 TABLET, ORALLY DISINTEGRATING ORAL EVERY 8 HOURS PRN
Qty: 30 TABLET | Refills: 1 | Status: SHIPPED | OUTPATIENT
Start: 2019-09-18 | End: 2019-11-06

## 2019-09-18 RX ORDER — FLUCONAZOLE 2 MG/ML
400 INJECTION, SOLUTION INTRAVENOUS EVERY 24 HOURS
Status: DISCONTINUED | OUTPATIENT
Start: 2019-09-18 | End: 2019-09-24

## 2019-09-18 RX ORDER — PROCHLORPERAZINE 25 MG
25 SUPPOSITORY, RECTAL RECTAL EVERY 12 HOURS PRN
Status: DISCONTINUED | OUTPATIENT
Start: 2019-09-18 | End: 2019-09-19

## 2019-09-18 RX ORDER — ONDANSETRON 4 MG/1
4 TABLET, ORALLY DISINTEGRATING ORAL EVERY 6 HOURS PRN
Status: DISCONTINUED | OUTPATIENT
Start: 2019-09-18 | End: 2019-09-19

## 2019-09-18 RX ORDER — MAGNESIUM SULFATE HEPTAHYDRATE 40 MG/ML
4 INJECTION, SOLUTION INTRAVENOUS EVERY 4 HOURS PRN
Status: DISCONTINUED | OUTPATIENT
Start: 2019-09-18 | End: 2019-09-19

## 2019-09-18 RX ORDER — POTASSIUM CL/LIDO/0.9 % NACL 10MEQ/0.1L
10 INTRAVENOUS SOLUTION, PIGGYBACK (ML) INTRAVENOUS
Status: DISCONTINUED | OUTPATIENT
Start: 2019-09-18 | End: 2019-09-19

## 2019-09-18 RX ORDER — PROCHLORPERAZINE MALEATE 5 MG
10 TABLET ORAL EVERY 6 HOURS PRN
Status: DISCONTINUED | OUTPATIENT
Start: 2019-09-18 | End: 2019-09-19

## 2019-09-18 RX ORDER — POTASSIUM CHLORIDE 7.45 MG/ML
10 INJECTION INTRAVENOUS
Status: DISCONTINUED | OUTPATIENT
Start: 2019-09-18 | End: 2019-09-19

## 2019-09-18 RX ORDER — MEROPENEM 1 G/1
1 INJECTION, POWDER, FOR SOLUTION INTRAVENOUS EVERY 8 HOURS
Status: COMPLETED | OUTPATIENT
Start: 2019-09-18 | End: 2019-09-24

## 2019-09-18 RX ORDER — TRAZODONE HYDROCHLORIDE 50 MG/1
50 TABLET, FILM COATED ORAL
COMMUNITY
End: 2019-11-06

## 2019-09-18 RX ORDER — POTASSIUM CHLORIDE 1500 MG/1
20-40 TABLET, EXTENDED RELEASE ORAL
Status: DISCONTINUED | OUTPATIENT
Start: 2019-09-18 | End: 2019-09-19

## 2019-09-18 RX ORDER — HYDROMORPHONE HYDROCHLORIDE 1 MG/ML
.3-.5 INJECTION, SOLUTION INTRAMUSCULAR; INTRAVENOUS; SUBCUTANEOUS
Status: DISCONTINUED | OUTPATIENT
Start: 2019-09-18 | End: 2019-09-19

## 2019-09-18 RX ORDER — OXYCODONE HYDROCHLORIDE 5 MG/1
5 TABLET ORAL EVERY 6 HOURS PRN
Qty: 20 TABLET | Refills: 0 | Status: ON HOLD | OUTPATIENT
Start: 2019-09-18 | End: 2019-09-18

## 2019-09-18 RX ORDER — SODIUM CHLORIDE 9 MG/ML
INJECTION, SOLUTION INTRAVENOUS CONTINUOUS
Status: DISCONTINUED | OUTPATIENT
Start: 2019-09-18 | End: 2019-09-19

## 2019-09-18 RX ORDER — POTASSIUM CHLORIDE 29.8 MG/ML
20 INJECTION INTRAVENOUS
Status: DISCONTINUED | OUTPATIENT
Start: 2019-09-18 | End: 2019-09-19

## 2019-09-18 RX ORDER — METOCLOPRAMIDE 10 MG/1
10 TABLET ORAL EVERY 6 HOURS PRN
Status: DISCONTINUED | OUTPATIENT
Start: 2019-09-18 | End: 2019-09-19

## 2019-09-18 RX ORDER — LIDOCAINE 40 MG/G
CREAM TOPICAL
Status: DISCONTINUED | OUTPATIENT
Start: 2019-09-18 | End: 2019-09-19

## 2019-09-18 RX ORDER — POTASSIUM CHLORIDE 1.5 G/1.58G
20-40 POWDER, FOR SOLUTION ORAL
Status: DISCONTINUED | OUTPATIENT
Start: 2019-09-18 | End: 2019-09-19

## 2019-09-18 RX ORDER — ONDANSETRON 2 MG/ML
4 INJECTION INTRAMUSCULAR; INTRAVENOUS EVERY 6 HOURS PRN
Status: DISCONTINUED | OUTPATIENT
Start: 2019-09-18 | End: 2019-09-19

## 2019-09-18 RX ORDER — NALOXONE HYDROCHLORIDE 0.4 MG/ML
.1-.4 INJECTION, SOLUTION INTRAMUSCULAR; INTRAVENOUS; SUBCUTANEOUS
Status: DISCONTINUED | OUTPATIENT
Start: 2019-09-18 | End: 2019-09-19

## 2019-09-18 RX ORDER — METOCLOPRAMIDE HYDROCHLORIDE 5 MG/ML
10 INJECTION INTRAMUSCULAR; INTRAVENOUS EVERY 6 HOURS PRN
Status: DISCONTINUED | OUTPATIENT
Start: 2019-09-18 | End: 2019-09-19

## 2019-09-18 RX ADMIN — MEROPENEM 1 G: 1 INJECTION, POWDER, FOR SOLUTION INTRAVENOUS at 18:56

## 2019-09-18 RX ADMIN — HYDROMORPHONE HYDROCHLORIDE 0.5 MG: 1 INJECTION, SOLUTION INTRAMUSCULAR; INTRAVENOUS; SUBCUTANEOUS at 11:47

## 2019-09-18 RX ADMIN — HYDROMORPHONE HYDROCHLORIDE 0.5 MG: 1 INJECTION, SOLUTION INTRAMUSCULAR; INTRAVENOUS; SUBCUTANEOUS at 13:40

## 2019-09-18 RX ADMIN — FLUCONAZOLE 400 MG: 400 INJECTION, SOLUTION INTRAVENOUS at 13:40

## 2019-09-18 RX ADMIN — ONDANSETRON HYDROCHLORIDE 4 MG: 2 INJECTION, SOLUTION INTRAMUSCULAR; INTRAVENOUS at 21:39

## 2019-09-18 RX ADMIN — MEROPENEM 1 G: 1 INJECTION, POWDER, FOR SOLUTION INTRAVENOUS at 11:53

## 2019-09-18 RX ADMIN — HYDROMORPHONE HYDROCHLORIDE 0.5 MG: 1 INJECTION, SOLUTION INTRAMUSCULAR; INTRAVENOUS; SUBCUTANEOUS at 16:29

## 2019-09-18 RX ADMIN — HYDROMORPHONE HYDROCHLORIDE 0.5 MG: 1 INJECTION, SOLUTION INTRAMUSCULAR; INTRAVENOUS; SUBCUTANEOUS at 21:39

## 2019-09-18 RX ADMIN — ONDANSETRON HYDROCHLORIDE 4 MG: 2 INJECTION, SOLUTION INTRAMUSCULAR; INTRAVENOUS at 12:42

## 2019-09-18 RX ADMIN — FAMOTIDINE 20 MG: 10 INJECTION INTRAVENOUS at 20:50

## 2019-09-18 RX ADMIN — ENOXAPARIN SODIUM 40 MG: 40 INJECTION SUBCUTANEOUS at 11:53

## 2019-09-18 RX ADMIN — SODIUM CHLORIDE 1000 ML: 9 INJECTION, SOLUTION INTRAVENOUS at 11:41

## 2019-09-18 RX ADMIN — SODIUM CHLORIDE: 9 INJECTION, SOLUTION INTRAVENOUS at 12:43

## 2019-09-18 RX ADMIN — HYDROMORPHONE HYDROCHLORIDE 0.5 MG: 1 INJECTION, SOLUTION INTRAMUSCULAR; INTRAVENOUS; SUBCUTANEOUS at 18:56

## 2019-09-18 RX ADMIN — SODIUM CHLORIDE: 9 INJECTION, SOLUTION INTRAVENOUS at 11:41

## 2019-09-18 ASSESSMENT — ACTIVITIES OF DAILY LIVING (ADL)
ADLS_ACUITY_SCORE: 13
RETIRED_COMMUNICATION: 0-->UNDERSTANDS/COMMUNICATES WITHOUT DIFFICULTY
FALL_HISTORY_WITHIN_LAST_SIX_MONTHS: NO
AMBULATION: 0-->INDEPENDENT
COGNITION: 0 - NO COGNITION ISSUES REPORTED
TOILETING: 0-->INDEPENDENT
BATHING: 0-->INDEPENDENT
DRESS: 0-->INDEPENDENT
TRANSFERRING: 0-->INDEPENDENT
ADLS_ACUITY_SCORE: 13
ADLS_ACUITY_SCORE: 13
RETIRED_EATING: 0-->INDEPENDENT
SWALLOWING: 0-->SWALLOWS FOODS/LIQUIDS WITHOUT DIFFICULTY

## 2019-09-18 ASSESSMENT — MIFFLIN-ST. JEOR: SCORE: 1560.21

## 2019-09-18 NOTE — H&P
[facility]  General Surgery H&P           Jim Mixon MRN# [id number]   YOB: 1965 Age: 54 year old      Date of Admission:  9/18/2019             Assessment and Plan:   Patient is a 54 year old male with a PMH of diverticulitis, admitted to the hospital with worsening complicated diverticulitis and need for urgent sigmoid colectomy.  At this point he remains hemodynamically stable without peritonitis    PLAN:  N.p.o.  CBC and BMP  Continue IV meropenem and Diflucan  We will defer bowel prep given feculent drain output and severe symptoms with defecation  Operating room for lap possible open sigmoid colectomy with likely end ostomy tomorrow, 9/19/2019.  Will ask urology to place bilateral ureteral stents prior to procedure.    TIME SPENT:  30 minutes was spent with patient and greater than 50% of the time was spent counseling and coordination of care.          Chief Complaint:   No chief complaint on file.           History of Present Illness:    Jim Mixon is a 54 year old male who presented with a history of recurrent sigmoid diverticulitis.  He was admitted on 8/19/2019 with complicated diverticulitis.  CT scan showed a very small amount of free air within the mesentery and stranding consistent with possible early abscess.  He clinically improved with IV antibiotics and bowel rest and was discharged on 8/22/19.  His previous episode of diverticulitis was approximately 5 years ago which occurred after he had his for screening colonoscopy at the age of 49.  He was readmitted on 9/4/19 with increasing LLQ pain, CT was performed  and revealed worsening diverticulitis with multiple abscesses. He received IV Zosyn and underwent CT guided diverticular abscess drainage on 9/5.   Abscess cultures showed E. Coli resistant to Zosyn so antibiotic was changed to Meropenem.  ID was consulted for antibiotic selection and duration.  Cultures showed candida as well and Diflucan  was started.  Repeat CT on 9/8 showed drain adequately controlling abscess, and stable intramural abscess and inflammation.  Patient continued to improve clinically following IR drain placement.  Patient was discharged home 9/10/19 with drain in place, as well as Bactrim, Augmentin, Fluconazole for 2 weeks per ID recommendations.  Presented to clinic 9/16 due to being concerned about ongoing abdominal pain, low appetite. CT scan was repeated 9/17 which showed stable intramural abscess, drain in good position without surrounding fluid.     He comes back to see me today after completing the CT scan yesterday.  He reports his appetite continues to be poor.  He really is not eating much at all.  He is having some nausea and has vomited in the last week.  He continues to have stools sometimes diarrhea and has noticed in the last 4 to 5 days that these are typically more painful.  He also noticed when he has a stool his drain output looks like poop.  He denies any fevers or chills.  He is still taking the antibiotics however.         Past Medical History:     Past Medical History:   Diagnosis Date     Anxiety      Diverticulitis      GERD (gastroesophageal reflux disease)      Hypertension           Past Surgical History:     Past Surgical History:   Procedure Laterality Date     CHOLECYSTECTOMY  01/2004          Current Medications:           sodium chloride 0.9%  1,000 mL Intravenous Once     enoxaparin  40 mg Subcutaneous Q24H     famotidine  20 mg Intravenous Q12H     fluconazole  400 mg Intravenous Q24H     meropenem  1 g Intravenous Q8H     sodium chloride (PF)  3 mL Intracatheter Q8H     HYDROmorphone, lidocaine 4%, lidocaine (buffered or not buffered), magnesium sulfate, melatonin, metoclopramide **OR** metoclopramide, naloxone, ondansetron **OR** ondansetron, potassium chloride, potassium chloride with lidocaine, potassium chloride, potassium chloride, potassium chloride, prochlorperazine **OR** prochlorperazine  **OR** prochlorperazine, sodium chloride (PF)       Home Medications:     Prior to Admission medications    Medication Sig Last Dose Taking? Auth Provider   acetaminophen (TYLENOL) 500 MG tablet Take 1,000 mg by mouth every 6 hours as needed for mild pain   Unknown, Entered By History   amoxicillin-clavulanate (AUGMENTIN) 875-125 MG tablet Take 1 tablet by mouth 2 times daily   Jaimee Barrios MD   esomeprazole (NEXIUM) 20 MG DR capsule Take 20 mg by mouth every morning (before breakfast) Take 30-60 minutes before eating.   Unknown, Entered By History   fluconazole (DIFLUCAN) 200 MG tablet Take 1 tablet (200 mg) by mouth daily for 14 days   Atul Merchant PA-C   LORazepam (ATIVAN) 1 MG tablet Take 1 mg by mouth every 6 hours as needed for anxiety   Unknown, Entered By History   ondansetron (ZOFRAN-ODT) 4 MG ODT tab Take 1 tablet (4 mg) by mouth every 8 hours as needed for nausea   Jaimee Barrios MD   oxyCODONE (ROXICODONE) 5 MG tablet Take 1 tablet (5 mg) by mouth every 6 hours as needed for severe pain   Jaimee Barrios MD   oxyCODONE (ROXICODONE) 5 MG tablet Take 1-2 tablets (5-10 mg) by mouth every 6 hours as needed for moderate to severe pain   Atul Merchant PA-C   sulfamethoxazole-trimethoprim (BACTRIM DS/SEPTRA DS) 800-160 MG tablet Take 1 tablet by mouth 2 times daily for 14 days   Atul Merchant PA-C   traZODone (DESYREL) 50 MG tablet Take 1 tablet (50 mg) by mouth At Bedtime   Jaimee Barriso MD          Allergies:     Allergies   Allergen Reactions     Ciprofloxacin           Family History:     Family History   Problem Relation Age of Onset     Diabetes Father      Diabetes Maternal Grandmother            Social History:   Jim Mixon  reports that he has never smoked. He has never used smokeless tobacco. He reports that he drank alcohol. He reports that he does not use drugs.        Review of Systems:   The 10 point Review of Systems is  negative other than noted in the HPI.            Physical Exam:   Blood pressure 139/87, pulse 95, temperature 97  F (36.1  C), temperature source Oral, resp. rate 18, weight 68 kg (149 lb 14.4 oz), SpO2 98 %.  149 lbs 14.4 oz Body mass index is 23.48 kg/m .  General: Appears pale and tired  Psych: Alert and Oriented.  Normal affect  Neurological: non-focal, moves extremities symmetrically, grossly normal strength and sensation  Eyes: Sclera clear  Respiratory: Breathing comfortably on room air  Cardiovascular:  Regular Rate  GI: Abdomen soft, slightly distended, focally tender left lower quadrant.  No peritoneal signs  MSK: extremities without edema  Integumentary:  No rashes           Labs/Imaging   All new lab and imaging data was reviewed.   CT scan from yesterday shows stable drain placement with no fluid with air around the drain tip.  Stable 4 x 1.5 cm intramural sigmoid abscess    Jaimee Barrios MD  9/18/2019 11:13 AM

## 2019-09-18 NOTE — LETTER
2019    RE: Jim Mixon, : 1965      Follow up visit     Jim Mixon is a 54 year old male who presented with a history of recurrent sigmoid diverticulitis.  He was admitted on 2019 with complicated diverticulitis.  CT scan showed a very small amount of free air within the mesentery and stranding consistent with possible early abscess.  He clinically improved with IV antibiotics and bowel rest and was discharged on 19.  His previous episode of diverticulitis was approximately 5 years ago which occurred after he had his for screening colonoscopy at the age of 49.  He was readmitted on 19 with increasing LLQ pain, CT was performed  and revealed worsening diverticulitis with multiple abscesses. He received IV Zosyn and underwent CT guided diverticular abscess drainage on .   Abscess cultures showed E. Coli resistant to Zosyn so antibiotic was changed to Meropenem.  ID was consulted for antibiotic selection and duration.  Cultures showed candida as well and Diflucan was started.  Repeat CT on  showed drain adequately controlling abscess, and stable intramural abscess and inflammation.  Patient continued to improve clinically following IR drain placement.  Patient was discharged home 9/10/19 with drain in place, as well as Bactrim, Augmentin, Fluconazole for 2 weeks per ID recommendations.  Presented to clinic  due to being concerned about ongoing abdominal pain, low appetite. CT scan was repeated  which showed stable intramural abscess, drain in good position without surrounding fluid.     He comes back to see me today after completing the CT scan yesterday.  He reports his appetite continues to be poor.  He really is not eating much at all.  He is having some nausea and has vomited in the last week.  He continues to have stools sometimes diarrhea and has noticed in the last 4 to 5 days that these are typically more painful.  He also noticed when  "he has a stool his drain output looks like poop.  He denies any fevers or chills.  He is still taking the antibiotics however.     /86   Pulse 94   Resp 16   Ht 1.702 m (5' 7\")   Wt 76.7 kg (169 lb)   SpO2 98%   BMI 26.47 kg/m    Appears pale.  Appears uncomfortable  Abdomen is soft, mildly distended, mildly tender in left lower quadrant.  Drain output appears brown.  Vomited while in the office.     Assessment and plan :  Given his ongoing symptoms with no improvement of the intramural abscess on CT scan yesterday after over a week on antibiotics, we discussed urgent sigmoidectomy and likely end ostomy.  We discussed waiting until next week however I do not think things will improve and actually will likely continue to worsen.  I am concerned about the feculent drain output.  He will be admitted to the hospital today and will undergo this procedure tomorrow.     Jaimee Barrios MD  "

## 2019-09-18 NOTE — PROGRESS NOTES
Follow up visit    Jim Mixon is a 54 year old male who presented with a history of recurrent sigmoid diverticulitis.  He was admitted on 8/19/2019 with complicated diverticulitis.  CT scan showed a very small amount of free air within the mesentery and stranding consistent with possible early abscess.  He clinically improved with IV antibiotics and bowel rest and was discharged on 8/22/19.  His previous episode of diverticulitis was approximately 5 years ago which occurred after he had his for screening colonoscopy at the age of 49.  He was readmitted on 9/4/19 with increasing LLQ pain, CT was performed  and revealed worsening diverticulitis with multiple abscesses. He received IV Zosyn and underwent CT guided diverticular abscess drainage on 9/5.   Abscess cultures showed E. Coli resistant to Zosyn so antibiotic was changed to Meropenem.  ID was consulted for antibiotic selection and duration.  Cultures showed candida as well and Diflucan was started.  Repeat CT on 9/8 showed drain adequately controlling abscess, and stable intramural abscess and inflammation.  Patient continued to improve clinically following IR drain placement.  Patient was discharged home 9/10/19 with drain in place, as well as Bactrim, Augmentin, Fluconazole for 2 weeks per ID recommendations.  Presented to clinic 9/16 due to being concerned about ongoing abdominal pain, low appetite. CT scan was repeated 9/17 which showed stable intramural abscess, drain in good position without surrounding fluid.    He comes back to see me today after completing the CT scan yesterday.  He reports his appetite continues to be poor.  He really is not eating much at all.  He is having some nausea and has vomited in the last week.  He continues to have stools sometimes diarrhea and has noticed in the last 4 to 5 days that these are typically more painful.  He also noticed when he has a stool his drain output looks like poop.  He denies any fevers or  "chills.  He is still taking the antibiotics however.    /86   Pulse 94   Resp 16   Ht 1.702 m (5' 7\")   Wt 76.7 kg (169 lb)   SpO2 98%   BMI 26.47 kg/m    Appears pale.  Appears uncomfortable  Abdomen is soft, mildly distended, mildly tender in left lower quadrant.  Drain output appears brown.  Vomited while in the office.    Assessment and plan :  Given his ongoing symptoms with no improvement of the intramural abscess on CT scan yesterday after over a week on antibiotics, we discussed urgent sigmoidectomy and likely end ostomy.  We discussed waiting until next week however I do not think things will improve and actually will likely continue to worsen.  I am concerned about the feculent drain output.  He will be admitted to the hospital today and will undergo this procedure tomorrow.    Jaimee Barrios MD    "

## 2019-09-18 NOTE — PHARMACY-ADMISSION MEDICATION HISTORY
Admission medication history interview status for this patient is complete. See [facility] admission navigator for allergy information, prior to admission medications and immunization status.     Medication history interview source(s):Patient  Medication history resources (including written lists, pill bottles, clinic record): Discharge summary on 9/10/19.  [facility]    Changes made to PTA medication list:  Added:  Probiotic  Deleted: Oxycodone (duplicate)  Changed:  Trazodone to prn    Actions taken by pharmacist (provider contacted, etc):None     Additional medication history information:   - Pt has one more day of Augmentin.  - Pt was started on Fluconazole & Bactrim DS on discharge 9/10/19    Medication reconciliation/reorder completed by provider prior to medication history? Yes    Do you take OTC medications (eg tylenol, ibuprofen, fish oil, eye/ear drops, etc)?  Yes, as listed.    For patients on insulin therapy:  No      Prior to Admission medications    Medication Sig Last Dose Taking? Auth Provider   acetaminophen (TYLENOL) 500 MG tablet Take 1,000 mg by mouth every 6 hours as needed for mild pain prn Yes Unknown, Entered By History   amoxicillin-clavulanate (AUGMENTIN) 875-125 MG tablet Take 1 tablet by mouth 2 times daily 9/17/2019 at pm Yes Jaimee Barrios MD   esomeprazole (NEXIUM) 20 MG DR capsule Take 20 mg by mouth every morning (before breakfast) Take 30-60 minutes before eating. 9/17/2019 at am Yes Unknown, Entered By History   fluconazole (DIFLUCAN) 200 MG tablet Take 1 tablet (200 mg) by mouth daily for 14 days 9/17/2019 at am Yes Atul Merchant PA-C   LORazepam (ATIVAN) 1 MG tablet Take 1 mg by mouth every 6 hours as needed for anxiety prn Yes Unknown, Entered By History   oxyCODONE (ROXICODONE) 5 MG tablet Take 1-2 tablets (5-10 mg) by mouth every 6 hours as needed for moderate to severe pain 9/18/2019 at 0500 Yes Atul Merchant PA-C   Probiotic  Product (PROBIOTIC PO) Take 2 tablets by mouth daily 9/16/2019 Yes Unknown, Entered By History   sulfamethoxazole-trimethoprim (BACTRIM DS/SEPTRA DS) 800-160 MG tablet Take 1 tablet by mouth 2 times daily for 14 days 9/17/2019 at am Yes Atul Merchant PA-C   traZODone (DESYREL) 50 MG tablet Take 50 mg by mouth nightly as needed for sleep prn Yes Unknown, Entered By History   ondansetron (ZOFRAN-ODT) 4 MG ODT tab Take 1 tablet (4 mg) by mouth every 8 hours as needed for nausea Didn't start yet  Jaimee Barrios MD

## 2019-09-19 ENCOUNTER — APPOINTMENT (OUTPATIENT)
Dept: SURGERY | Facility: PHYSICIAN GROUP | Age: 54
End: 2019-09-19
Payer: COMMERCIAL

## 2019-09-19 ENCOUNTER — ANESTHESIA (OUTPATIENT)
Dept: SURGERY | Facility: CLINIC | Age: 54
DRG: 330 | End: 2019-09-19
Payer: COMMERCIAL

## 2019-09-19 ENCOUNTER — ANESTHESIA EVENT (OUTPATIENT)
Dept: SURGERY | Facility: CLINIC | Age: 54
DRG: 330 | End: 2019-09-19
Payer: COMMERCIAL

## 2019-09-19 PROBLEM — K57.32 DIVERTICULITIS LARGE INTESTINE: Status: ACTIVE | Noted: 2019-09-19

## 2019-09-19 LAB
ANION GAP SERPL CALCULATED.3IONS-SCNC: 4 MMOL/L (ref 3–14)
BUN SERPL-MCNC: 14 MG/DL (ref 7–30)
CALCIUM SERPL-MCNC: 9.5 MG/DL (ref 8.5–10.1)
CHLORIDE SERPL-SCNC: 105 MMOL/L (ref 94–109)
CO2 SERPL-SCNC: 26 MMOL/L (ref 20–32)
CREAT SERPL-MCNC: 1.22 MG/DL (ref 0.66–1.25)
ERYTHROCYTE [DISTWIDTH] IN BLOOD BY AUTOMATED COUNT: 12.9 % (ref 10–15)
GFR SERPL CREATININE-BSD FRML MDRD: 66 ML/MIN/{1.73_M2}
GLUCOSE SERPL-MCNC: 83 MG/DL (ref 70–99)
HCT VFR BLD AUTO: 37.6 % (ref 40–53)
HGB BLD-MCNC: 11.8 G/DL (ref 13.3–17.7)
MCH RBC QN AUTO: 28.9 PG (ref 26.5–33)
MCHC RBC AUTO-ENTMCNC: 31.4 G/DL (ref 31.5–36.5)
MCV RBC AUTO: 92 FL (ref 78–100)
PLATELET # BLD AUTO: 347 10E9/L (ref 150–450)
POTASSIUM SERPL-SCNC: 4.6 MMOL/L (ref 3.4–5.3)
RBC # BLD AUTO: 4.08 10E12/L (ref 4.4–5.9)
SODIUM SERPL-SCNC: 135 MMOL/L (ref 133–144)
WBC # BLD AUTO: 6.4 10E9/L (ref 4–11)

## 2019-09-19 PROCEDURE — 44206 LAP PART COLECTOMY W/STOMA: CPT | Mod: 80 | Performed by: SURGERY

## 2019-09-19 PROCEDURE — 93010 ELECTROCARDIOGRAM REPORT: CPT | Performed by: INTERNAL MEDICINE

## 2019-09-19 PROCEDURE — 0D1M0Z4 BYPASS DESCENDING COLON TO CUTANEOUS, OPEN APPROACH: ICD-10-PCS | Performed by: SURGERY

## 2019-09-19 PROCEDURE — 40000275 ZZH STATISTIC RCP TIME EA 10 MIN

## 2019-09-19 PROCEDURE — 25800025 ZZH RX 258: Performed by: SURGERY

## 2019-09-19 PROCEDURE — 80048 BASIC METABOLIC PNL TOTAL CA: CPT | Performed by: SURGERY

## 2019-09-19 PROCEDURE — 25000128 H RX IP 250 OP 636: Performed by: ANESTHESIOLOGY

## 2019-09-19 PROCEDURE — 36415 COLL VENOUS BLD VENIPUNCTURE: CPT | Performed by: SURGERY

## 2019-09-19 PROCEDURE — 44213 LAP MOBIL SPLENIC FL ADD-ON: CPT | Performed by: SURGERY

## 2019-09-19 PROCEDURE — 88307 TISSUE EXAM BY PATHOLOGIST: CPT | Mod: 26 | Performed by: SURGERY

## 2019-09-19 PROCEDURE — 25000125 ZZHC RX 250: Performed by: NURSE ANESTHETIST, CERTIFIED REGISTERED

## 2019-09-19 PROCEDURE — C1758 CATHETER, URETERAL: HCPCS | Performed by: SURGERY

## 2019-09-19 PROCEDURE — 12000000 ZZH R&B MED SURG/OB

## 2019-09-19 PROCEDURE — 52005 CYSTO W/URTRL CATHJ: CPT | Performed by: UROLOGY

## 2019-09-19 PROCEDURE — 25000125 ZZHC RX 250: Performed by: SURGERY

## 2019-09-19 PROCEDURE — 25800025 ZZH RX 258: Performed by: UROLOGY

## 2019-09-19 PROCEDURE — 0DTN0ZZ RESECTION OF SIGMOID COLON, OPEN APPROACH: ICD-10-PCS | Performed by: SURGERY

## 2019-09-19 PROCEDURE — 27210794 ZZH OR GENERAL SUPPLY STERILE: Performed by: SURGERY

## 2019-09-19 PROCEDURE — 44213 LAP MOBIL SPLENIC FL ADD-ON: CPT | Mod: 80 | Performed by: SURGERY

## 2019-09-19 PROCEDURE — 37000009 ZZH ANESTHESIA TECHNICAL FEE, EACH ADDTL 15 MIN: Performed by: SURGERY

## 2019-09-19 PROCEDURE — 25800030 ZZH RX IP 258 OP 636: Performed by: SURGERY

## 2019-09-19 PROCEDURE — 36000069 ZZH SURGERY LEVEL 5 EA 15 ADDTL MIN: Performed by: SURGERY

## 2019-09-19 PROCEDURE — 25800030 ZZH RX IP 258 OP 636: Performed by: ANESTHESIOLOGY

## 2019-09-19 PROCEDURE — 25000128 H RX IP 250 OP 636: Performed by: NURSE ANESTHETIST, CERTIFIED REGISTERED

## 2019-09-19 PROCEDURE — 88307 TISSUE EXAM BY PATHOLOGIST: CPT | Performed by: SURGERY

## 2019-09-19 PROCEDURE — 71000013 ZZH RECOVERY PHASE 1 LEVEL 1 EA ADDTL HR: Performed by: SURGERY

## 2019-09-19 PROCEDURE — 85027 COMPLETE CBC AUTOMATED: CPT | Performed by: SURGERY

## 2019-09-19 PROCEDURE — 40000306 ZZH STATISTIC PRE PROC ASSESS II: Performed by: SURGERY

## 2019-09-19 PROCEDURE — C1765 ADHESION BARRIER: HCPCS | Performed by: SURGERY

## 2019-09-19 PROCEDURE — 37000008 ZZH ANESTHESIA TECHNICAL FEE, 1ST 30 MIN: Performed by: SURGERY

## 2019-09-19 PROCEDURE — 71000012 ZZH RECOVERY PHASE 1 LEVEL 1 FIRST HR: Performed by: SURGERY

## 2019-09-19 PROCEDURE — 36000067 ZZH SURGERY LEVEL 5 1ST 30 MIN: Performed by: SURGERY

## 2019-09-19 PROCEDURE — 25000132 ZZH RX MED GY IP 250 OP 250 PS 637: Performed by: SURGERY

## 2019-09-19 PROCEDURE — 44206 LAP PART COLECTOMY W/STOMA: CPT | Performed by: SURGERY

## 2019-09-19 PROCEDURE — 93005 ELECTROCARDIOGRAM TRACING: CPT

## 2019-09-19 PROCEDURE — 25000128 H RX IP 250 OP 636: Performed by: SURGERY

## 2019-09-19 RX ORDER — LORAZEPAM 1 MG/1
1 TABLET ORAL EVERY 6 HOURS PRN
Status: DISCONTINUED | OUTPATIENT
Start: 2019-09-19 | End: 2019-09-20 | Stop reason: ALTCHOICE

## 2019-09-19 RX ORDER — ONDANSETRON 2 MG/ML
INJECTION INTRAMUSCULAR; INTRAVENOUS PRN
Status: DISCONTINUED | OUTPATIENT
Start: 2019-09-19 | End: 2019-09-19

## 2019-09-19 RX ORDER — LIDOCAINE HYDROCHLORIDE 10 MG/ML
INJECTION, SOLUTION INFILTRATION; PERINEURAL PRN
Status: DISCONTINUED | OUTPATIENT
Start: 2019-09-19 | End: 2019-09-19

## 2019-09-19 RX ORDER — OXYCODONE HYDROCHLORIDE 5 MG/1
5-10 TABLET ORAL EVERY 6 HOURS PRN
Status: DISCONTINUED | OUTPATIENT
Start: 2019-09-19 | End: 2019-09-23

## 2019-09-19 RX ORDER — PROPOFOL 10 MG/ML
INJECTION, EMULSION INTRAVENOUS PRN
Status: DISCONTINUED | OUTPATIENT
Start: 2019-09-19 | End: 2019-09-19

## 2019-09-19 RX ORDER — ONDANSETRON 4 MG/1
4 TABLET, ORALLY DISINTEGRATING ORAL EVERY 30 MIN PRN
Status: DISCONTINUED | OUTPATIENT
Start: 2019-09-19 | End: 2019-09-19 | Stop reason: HOSPADM

## 2019-09-19 RX ORDER — GLYCOPYRROLATE 0.2 MG/ML
INJECTION, SOLUTION INTRAMUSCULAR; INTRAVENOUS PRN
Status: DISCONTINUED | OUTPATIENT
Start: 2019-09-19 | End: 2019-09-19

## 2019-09-19 RX ORDER — HYDRALAZINE HYDROCHLORIDE 20 MG/ML
2.5-5 INJECTION INTRAMUSCULAR; INTRAVENOUS EVERY 10 MIN PRN
Status: DISCONTINUED | OUTPATIENT
Start: 2019-09-19 | End: 2019-09-19 | Stop reason: HOSPADM

## 2019-09-19 RX ORDER — SODIUM CHLORIDE, SODIUM LACTATE, POTASSIUM CHLORIDE, CALCIUM CHLORIDE 600; 310; 30; 20 MG/100ML; MG/100ML; MG/100ML; MG/100ML
INJECTION, SOLUTION INTRAVENOUS CONTINUOUS
Status: DISCONTINUED | OUTPATIENT
Start: 2019-09-19 | End: 2019-09-19 | Stop reason: HOSPADM

## 2019-09-19 RX ORDER — NALOXONE HYDROCHLORIDE 0.4 MG/ML
.1-.4 INJECTION, SOLUTION INTRAMUSCULAR; INTRAVENOUS; SUBCUTANEOUS
Status: DISCONTINUED | OUTPATIENT
Start: 2019-09-19 | End: 2019-09-25 | Stop reason: HOSPADM

## 2019-09-19 RX ORDER — NALOXONE HYDROCHLORIDE 0.4 MG/ML
.1-.4 INJECTION, SOLUTION INTRAMUSCULAR; INTRAVENOUS; SUBCUTANEOUS
Status: DISCONTINUED | OUTPATIENT
Start: 2019-09-19 | End: 2019-09-19

## 2019-09-19 RX ORDER — ONDANSETRON 2 MG/ML
4 INJECTION INTRAMUSCULAR; INTRAVENOUS EVERY 30 MIN PRN
Status: DISCONTINUED | OUTPATIENT
Start: 2019-09-19 | End: 2019-09-19 | Stop reason: HOSPADM

## 2019-09-19 RX ORDER — LIDOCAINE 40 MG/G
CREAM TOPICAL
Status: DISCONTINUED | OUTPATIENT
Start: 2019-09-19 | End: 2019-09-25 | Stop reason: HOSPADM

## 2019-09-19 RX ORDER — NEOSTIGMINE METHYLSULFATE 1 MG/ML
VIAL (ML) INJECTION PRN
Status: DISCONTINUED | OUTPATIENT
Start: 2019-09-19 | End: 2019-09-19

## 2019-09-19 RX ORDER — DEXAMETHASONE SODIUM PHOSPHATE 4 MG/ML
INJECTION, SOLUTION INTRA-ARTICULAR; INTRALESIONAL; INTRAMUSCULAR; INTRAVENOUS; SOFT TISSUE PRN
Status: DISCONTINUED | OUTPATIENT
Start: 2019-09-19 | End: 2019-09-19

## 2019-09-19 RX ORDER — BUPIVACAINE HYDROCHLORIDE AND EPINEPHRINE 2.5; 5 MG/ML; UG/ML
INJECTION, SOLUTION EPIDURAL; INFILTRATION; INTRACAUDAL; PERINEURAL PRN
Status: DISCONTINUED | OUTPATIENT
Start: 2019-09-19 | End: 2019-09-19 | Stop reason: HOSPADM

## 2019-09-19 RX ORDER — HYDROMORPHONE HYDROCHLORIDE 1 MG/ML
.3-.5 INJECTION, SOLUTION INTRAMUSCULAR; INTRAVENOUS; SUBCUTANEOUS EVERY 5 MIN PRN
Status: DISCONTINUED | OUTPATIENT
Start: 2019-09-19 | End: 2019-09-19 | Stop reason: HOSPADM

## 2019-09-19 RX ORDER — ONDANSETRON 2 MG/ML
4 INJECTION INTRAMUSCULAR; INTRAVENOUS EVERY 6 HOURS PRN
Status: DISCONTINUED | OUTPATIENT
Start: 2019-09-19 | End: 2019-09-25 | Stop reason: HOSPADM

## 2019-09-19 RX ORDER — LIDOCAINE 40 MG/G
CREAM TOPICAL
Status: DISCONTINUED | OUTPATIENT
Start: 2019-09-19 | End: 2019-09-19 | Stop reason: HOSPADM

## 2019-09-19 RX ORDER — HYDROMORPHONE HYDROCHLORIDE 1 MG/ML
.3-.5 INJECTION, SOLUTION INTRAMUSCULAR; INTRAVENOUS; SUBCUTANEOUS
Status: DISCONTINUED | OUTPATIENT
Start: 2019-09-19 | End: 2019-09-20

## 2019-09-19 RX ORDER — KETAMINE HYDROCHLORIDE 10 MG/ML
INJECTION INTRAMUSCULAR; INTRAVENOUS PRN
Status: DISCONTINUED | OUTPATIENT
Start: 2019-09-19 | End: 2019-09-19

## 2019-09-19 RX ORDER — FENTANYL CITRATE 50 UG/ML
25-50 INJECTION, SOLUTION INTRAMUSCULAR; INTRAVENOUS
Status: DISCONTINUED | OUTPATIENT
Start: 2019-09-19 | End: 2019-09-19 | Stop reason: HOSPADM

## 2019-09-19 RX ORDER — ACETAMINOPHEN 325 MG/1
975 TABLET ORAL EVERY 8 HOURS
Status: COMPLETED | OUTPATIENT
Start: 2019-09-19 | End: 2019-09-22

## 2019-09-19 RX ORDER — LABETALOL 20 MG/4 ML (5 MG/ML) INTRAVENOUS SYRINGE
10
Status: DISCONTINUED | OUTPATIENT
Start: 2019-09-19 | End: 2019-09-19 | Stop reason: HOSPADM

## 2019-09-19 RX ORDER — FENTANYL CITRATE 50 UG/ML
INJECTION, SOLUTION INTRAMUSCULAR; INTRAVENOUS PRN
Status: DISCONTINUED | OUTPATIENT
Start: 2019-09-19 | End: 2019-09-19

## 2019-09-19 RX ADMIN — DEXAMETHASONE SODIUM PHOSPHATE 4 MG: 4 INJECTION, SOLUTION INTRA-ARTICULAR; INTRALESIONAL; INTRAMUSCULAR; INTRAVENOUS; SOFT TISSUE at 13:12

## 2019-09-19 RX ADMIN — DEXTROSE AND SODIUM CHLORIDE: 5; 450 INJECTION, SOLUTION INTRAVENOUS at 22:05

## 2019-09-19 RX ADMIN — ONDANSETRON HYDROCHLORIDE 4 MG: 2 INJECTION, SOLUTION INTRAVENOUS at 16:55

## 2019-09-19 RX ADMIN — LIDOCAINE HYDROCHLORIDE 40 MG: 10 INJECTION, SOLUTION INFILTRATION; PERINEURAL at 13:12

## 2019-09-19 RX ADMIN — MEROPENEM 1 G: 1 INJECTION, POWDER, FOR SOLUTION INTRAVENOUS at 02:25

## 2019-09-19 RX ADMIN — FENTANYL CITRATE 50 MCG: 50 INJECTION, SOLUTION INTRAMUSCULAR; INTRAVENOUS at 14:46

## 2019-09-19 RX ADMIN — FAMOTIDINE 20 MG: 10 INJECTION INTRAVENOUS at 07:31

## 2019-09-19 RX ADMIN — MIDAZOLAM 2 MG: 1 INJECTION INTRAMUSCULAR; INTRAVENOUS at 13:06

## 2019-09-19 RX ADMIN — HYDROMORPHONE HYDROCHLORIDE 0.5 MG: 1 INJECTION, SOLUTION INTRAMUSCULAR; INTRAVENOUS; SUBCUTANEOUS at 07:31

## 2019-09-19 RX ADMIN — FENTANYL CITRATE 100 MCG: 50 INJECTION, SOLUTION INTRAMUSCULAR; INTRAVENOUS at 14:30

## 2019-09-19 RX ADMIN — HYDROMORPHONE HYDROCHLORIDE 0.5 MG: 1 INJECTION, SOLUTION INTRAMUSCULAR; INTRAVENOUS; SUBCUTANEOUS at 23:52

## 2019-09-19 RX ADMIN — HYDROMORPHONE HYDROCHLORIDE 0.5 MG: 1 INJECTION, SOLUTION INTRAMUSCULAR; INTRAVENOUS; SUBCUTANEOUS at 00:24

## 2019-09-19 RX ADMIN — HYDROMORPHONE HYDROCHLORIDE 0.5 MG: 1 INJECTION, SOLUTION INTRAMUSCULAR; INTRAVENOUS; SUBCUTANEOUS at 19:50

## 2019-09-19 RX ADMIN — ROCURONIUM BROMIDE 10 MG: 10 INJECTION INTRAVENOUS at 14:30

## 2019-09-19 RX ADMIN — SODIUM CHLORIDE: 9 INJECTION, SOLUTION INTRAVENOUS at 13:06

## 2019-09-19 RX ADMIN — MEROPENEM 1 G: 1 INJECTION, POWDER, FOR SOLUTION INTRAVENOUS at 18:16

## 2019-09-19 RX ADMIN — HYDROMORPHONE HYDROCHLORIDE 0.5 MG: 1 INJECTION, SOLUTION INTRAMUSCULAR; INTRAVENOUS; SUBCUTANEOUS at 13:48

## 2019-09-19 RX ADMIN — HYDROMORPHONE HYDROCHLORIDE 0.5 MG: 1 INJECTION, SOLUTION INTRAMUSCULAR; INTRAVENOUS; SUBCUTANEOUS at 13:59

## 2019-09-19 RX ADMIN — HYDROMORPHONE HYDROCHLORIDE 0.5 MG: 1 INJECTION, SOLUTION INTRAMUSCULAR; INTRAVENOUS; SUBCUTANEOUS at 20:34

## 2019-09-19 RX ADMIN — HYDROMORPHONE HYDROCHLORIDE 0.5 MG: 1 INJECTION, SOLUTION INTRAMUSCULAR; INTRAVENOUS; SUBCUTANEOUS at 21:01

## 2019-09-19 RX ADMIN — SODIUM CHLORIDE, POTASSIUM CHLORIDE, SODIUM LACTATE AND CALCIUM CHLORIDE: 600; 310; 30; 20 INJECTION, SOLUTION INTRAVENOUS at 20:08

## 2019-09-19 RX ADMIN — SODIUM CHLORIDE, POTASSIUM CHLORIDE, SODIUM LACTATE AND CALCIUM CHLORIDE: 600; 310; 30; 20 INJECTION, SOLUTION INTRAVENOUS at 13:55

## 2019-09-19 RX ADMIN — ROCURONIUM BROMIDE 20 MG: 10 INJECTION INTRAVENOUS at 15:31

## 2019-09-19 RX ADMIN — Medication 4 MG: at 17:28

## 2019-09-19 RX ADMIN — PROPOFOL 170 MG: 10 INJECTION, EMULSION INTRAVENOUS at 13:12

## 2019-09-19 RX ADMIN — FENTANYL CITRATE 50 MCG: 50 INJECTION INTRAMUSCULAR; INTRAVENOUS at 18:28

## 2019-09-19 RX ADMIN — HYDROMORPHONE HYDROCHLORIDE 0.5 MG: 1 INJECTION, SOLUTION INTRAMUSCULAR; INTRAVENOUS; SUBCUTANEOUS at 04:22

## 2019-09-19 RX ADMIN — MEROPENEM 1 G: 1 INJECTION, POWDER, FOR SOLUTION INTRAVENOUS at 10:17

## 2019-09-19 RX ADMIN — ACETAMINOPHEN 975 MG: 325 TABLET, FILM COATED ORAL at 22:01

## 2019-09-19 RX ADMIN — ROCURONIUM BROMIDE 10 MG: 10 INJECTION INTRAVENOUS at 15:05

## 2019-09-19 RX ADMIN — ENOXAPARIN SODIUM 40 MG: 40 INJECTION SUBCUTANEOUS at 10:17

## 2019-09-19 RX ADMIN — HYDROMORPHONE HYDROCHLORIDE 0.5 MG: 1 INJECTION, SOLUTION INTRAMUSCULAR; INTRAVENOUS; SUBCUTANEOUS at 09:52

## 2019-09-19 RX ADMIN — ROCURONIUM BROMIDE 20 MG: 10 INJECTION INTRAVENOUS at 16:16

## 2019-09-19 RX ADMIN — ROCURONIUM BROMIDE 10 MG: 10 INJECTION INTRAVENOUS at 13:57

## 2019-09-19 RX ADMIN — HYDROMORPHONE HYDROCHLORIDE 0.5 MG: 1 INJECTION, SOLUTION INTRAMUSCULAR; INTRAVENOUS; SUBCUTANEOUS at 18:57

## 2019-09-19 RX ADMIN — GLYCOPYRROLATE 0.2 MG: 0.2 INJECTION, SOLUTION INTRAMUSCULAR; INTRAVENOUS at 13:12

## 2019-09-19 RX ADMIN — Medication 50 MG: at 15:06

## 2019-09-19 RX ADMIN — GLYCOPYRROLATE 0.8 MG: 0.2 INJECTION, SOLUTION INTRAMUSCULAR; INTRAVENOUS at 17:28

## 2019-09-19 RX ADMIN — ROCURONIUM BROMIDE 10 MG: 10 INJECTION INTRAVENOUS at 14:46

## 2019-09-19 RX ADMIN — FENTANYL CITRATE 50 MCG: 50 INJECTION INTRAMUSCULAR; INTRAVENOUS at 18:07

## 2019-09-19 RX ADMIN — ROCURONIUM BROMIDE 10 MG: 10 INJECTION INTRAVENOUS at 14:00

## 2019-09-19 RX ADMIN — HYDROMORPHONE HYDROCHLORIDE 0.5 MG: 1 INJECTION, SOLUTION INTRAMUSCULAR; INTRAVENOUS; SUBCUTANEOUS at 21:57

## 2019-09-19 RX ADMIN — SODIUM CHLORIDE, POTASSIUM CHLORIDE, SODIUM LACTATE AND CALCIUM CHLORIDE: 600; 310; 30; 20 INJECTION, SOLUTION INTRAVENOUS at 16:39

## 2019-09-19 RX ADMIN — SODIUM CHLORIDE: 9 INJECTION, SOLUTION INTRAVENOUS at 04:13

## 2019-09-19 RX ADMIN — FLUCONAZOLE 400 MG: 400 INJECTION, SOLUTION INTRAVENOUS at 11:12

## 2019-09-19 RX ADMIN — ROCURONIUM BROMIDE 40 MG: 10 INJECTION INTRAVENOUS at 13:12

## 2019-09-19 RX ADMIN — FENTANYL CITRATE 50 MCG: 50 INJECTION INTRAMUSCULAR; INTRAVENOUS at 19:26

## 2019-09-19 RX ADMIN — FENTANYL CITRATE 50 MCG: 50 INJECTION INTRAMUSCULAR; INTRAVENOUS at 19:00

## 2019-09-19 RX ADMIN — FENTANYL CITRATE 100 MCG: 50 INJECTION, SOLUTION INTRAMUSCULAR; INTRAVENOUS at 13:12

## 2019-09-19 ASSESSMENT — ACTIVITIES OF DAILY LIVING (ADL)
ADLS_ACUITY_SCORE: 13

## 2019-09-19 NOTE — ANESTHESIA CARE TRANSFER NOTE
Patient: Jim Abelardo Mixon    Procedure(s):  LAPAROSCOPY-ASSISTED, SIGMOIDCOLECTOMY WITH END COLOSTOMY  PREOPERATIVE BILATERAL URETERAL STENT PLACEMENT    Diagnosis: Diverticulitis  Diagnosis Additional Information: No value filed.    Anesthesia Type:   General, ETT     Note:  Airway :Face Mask  Patient transferred to:PACU  Comments: VSS.  Spontaneously breathing O2 per open face mask.  Report given to RN.Handoff Report: Identifed the Patient, Identified the Reponsible Provider, Reviewed the pertinent medical history, Discussed the surgical course, Reviewed Intra-OP anesthesia mangement and issues during anesthesia, Set expectations for post-procedure period and Allowed opportunity for questions and acknowledgement of understanding      Vitals: (Last set prior to Anesthesia Care Transfer)    CRNA VITALS  9/19/2019 1713 - 9/19/2019 1755      9/19/2019             Pulse:  116    SpO2:  100 %    Resp Rate (observed):  29                Electronically Signed By: FADIA Clarke CRNA  September 19, 2019  5:55 PM

## 2019-09-19 NOTE — PLAN OF CARE
Pt is A&O x4.  NPO, ice chips.  NS infusing 100mL/hr.  Pt has pain in left lower abd, iv dilaudid given x2 with relief.  KARIN drain.  IV Merrem andf Diflucan.  Surgery am sigmoid colectomy with urology to place uretal stents prior to procedure.  Wife at bedside. Pt is independent. Discharge TBD.  Continue plan of care.

## 2019-09-19 NOTE — PLAN OF CARE
Pt A/Ox4, VSS, afebrile, RA, c/o LLQ pain- PRN IV dilaudid given x3 this shift, IV zofran given x1 for c/o nausea, IVF NS @ 100 ml/hr, continue IV merrem and IV Diflucan, LS- clear, +BS, left KARIN drain in place- 15 ml output, NPO, plan for surgery tomorrow, spouse at bedside, Thai speaking but understand and speaks english well,  ordered for tomorrow, continue POC.

## 2019-09-19 NOTE — ANESTHESIA PREPROCEDURE EVALUATION
Anesthesia Pre-Procedure Evaluation    Patient: Jim Mixon   MRN: [id number] : 1965          Preoperative Diagnosis: Diverticulitis    Procedure(s):  1.)Preoperative Bilateral Ureteral Stent Placement (Rodríguez) 2.)COLECTOMY, SIGMOID, LAPAROSCOPY-ASSISTED, POSSIBLE COLOSTOMY  INSERTION, STENT, URETER, PREOPERATIVE    Past Medical History:   Diagnosis Date     Anxiety      Diverticulitis      GERD (gastroesophageal reflux disease)      Hypertension      Past Surgical History:   Procedure Laterality Date     CHOLECYSTECTOMY  2004     Anesthesia Evaluation     . Pt has had prior anesthetic.            ROS/MED HX    ENT/Pulmonary:       Neurologic:       Cardiovascular:     (+) hypertension----. : . . . :. .       METS/Exercise Tolerance:     Hematologic:         Musculoskeletal:         GI/Hepatic:     (+) GERD Other GI/Hepatic diverrticulitis      Renal/Genitourinary:         Endo:         Psychiatric:     (+) psychiatric history anxiety      Infectious Disease:         Malignancy:         Other:                          Physical Exam  Normal systems: cardiovascular and pulmonary    Airway   Mallampati: II  TM distance: >3 FB  Neck ROM: full    Dental     Cardiovascular       Pulmonary             Lab Results   Component Value Date    WBC 6.4 2019    HGB 11.8 (L) 2019    HCT 37.6 (L) 2019     2019     2019    POTASSIUM 4.6 2019    CHLORIDE 105 2019    CO2 26 2019    BUN 14 2019    CR 1.22 2019    GLC 83 2019    JOSEFINA 9.5 2019    PHOS 2.7 2019    MAG 1.9 2019    ALBUMIN 4.2 2019    PROTTOTAL 7.9 2019    ALT 52 2019    AST 28 2019    ALKPHOS 90 2019    BILITOTAL 0.8 2019    INR 1.06 2019       Preop Vitals  BP Readings from Last 3 Encounters:   19 (!) 145/87   19 122/86   19 124/76    Pulse Readings from Last 3 Encounters:   19 70   19  "94   09/16/19 116      Resp Readings from Last 3 Encounters:   09/19/19 12   09/18/19 16   09/16/19 16    SpO2 Readings from Last 3 Encounters:   09/19/19 99%   09/18/19 98%   09/16/19 95%      Temp Readings from Last 1 Encounters:   09/19/19 97.2  F (36.2  C) (Temporal)    Ht Readings from Last 1 Encounters:   09/18/19 1.702 m (5' 7\")      Wt Readings from Last 1 Encounters:   09/19/19 68.2 kg (150 lb 4.8 oz)    Estimated body mass index is 23.54 kg/m  as calculated from the following:    Height as of an earlier encounter on 9/18/19: 1.702 m (5' 7\").    Weight as of this encounter: 68.2 kg (150 lb 4.8 oz).       Anesthesia Plan      History & Physical Review  History and physical reviewed and following examination; no interval change.    ASA Status:  2 .    NPO Status:  > 8 hours    Plan for General and ETT with Intravenous and Propofol induction. Maintenance will be Balanced.    PONV prophylaxis:  Ondansetron (or other 5HT-3) and Dexamethasone or Solumedrol       Postoperative Care  Postoperative pain management:  IV analgesics.      Consents  Anesthetic plan, risks, benefits and alternatives discussed with:  Patient.  Use of blood products discussed: Yes.   Use of blood products discussed with Patient.  Consented to blood products.  .                 Ethan Garcia MD                    .  "

## 2019-09-19 NOTE — OP NOTE
Procedure Date: 2019      PREOPERATIVE DIAGNOSIS:  Bacterial diverticulitis.      POSTOPERATIVE DIAGNOSIS:  Bacterial diverticulitis.      PROCEDURES PERFORMED:  Video cystoscopy, EUA, bilateral ureteral catheter placement.      SURGEON:  Bridget Jang MD.      ANESTHESIA:  General endotracheal.      ESTIMATED BLOOD LOSS:  0 mL.      INDICATIONS:  The patient is a 54-year-old male with recurrent sigmoid diverticular disease.  He now presents for colon resection with Dr. Barrios.  I was asked to see the patient preoperatively and perform cystoscopy and bilateral ureteral catheter placement.  The procedure, the alternatives, risks and follow-up were carefully discussed with the patient and his family.      DESCRIPTION OF THE PROCEDURE:  The patient received IV antibiotics ordered by Dr. Barrios.  He was taken to the operating suite and placed supine on the operating table.  After adequate general endotracheal anesthesia, the patient was placed in lithotomy position and his genitalia were prepped and draped in a sterile fashion.  A #22 [place] Storz cystoscope with 30 degree lens and video were used to visualize the urethra and bladder using water as an irrigant.  The urethra appeared normal and the prostatic fossa was opened.  The bladder revealed no fistula, tumors or stones.  Both ureteral orifices were identified and easily passed TigerTail catheters up each ureter to 24 cm without any resistance.  I removed the cystoscope and passed a 16 Ross into the bladder and placed 10 mL of saline in the Ross balloon.  I then secured the ureteral catheters to the Ross catheter and placed all to gravity drainage.  Rectal exam revealed no rectal mass and a benign and symmetric prostate and normal seminal vesicles.         BRIDGET JANG JR, MD             D: 2019   T: 2019   MT:       Name:     YISEL COUGHLINO   MRN:      [id number]        Account:        [id number]   :      1965            Procedure Date: 09/19/2019      Document: [id number]       cc: Alex Amalia [facility]        Jaimee Liao Jr, MD

## 2019-09-19 NOTE — PLAN OF CARE
VSS except slight HTN of 145/87, A/OX4, independent transfer, LS clear, no NV, IVF NS running at 100 mL/hr, KARIN drain draining milky tan brown fluid, pt went down to surgery at 1115, pt c/o 5-8/10 pain, dilaudid given twice this shift, pain after dilaudid administration drops to 3-4/10, K-4.6, creatinine-1.22, no plans for discharge at this time.

## 2019-09-19 NOTE — BRIEF OP NOTE
[facility]    Brief Operative Note    Pre-operative diagnosis: Diverticulitis with abscess  Post-operative diagnosis Diverticulitis with abscess  Procedure: Procedure(s):  LAPAROSCOPY-ASSISTED, SIGMOIDCOLECTOMY WITH END COLOSTOMY  PREOPERATIVE BILATERAL URETERAL STENT PLACEMENT   Lysis of adhesions  Surgeon: Surgeon(s) and Role:  Panel 1:     * Jaimee Barrios MD - Primary     * Rosy Rush PA-C - Assisting     * Jeevan Conway MD - Assisting     * Desiree Flores PA-C - Assisting  Panel 2:     * Toni Liao MD - Primary  Anesthesia: General   Estimated blood loss: Less than 100 ml  Drains: None  Specimens:   ID Type Source Tests Collected by Time Destination   A : Sigmoid Colon Tissue Large Intestine, Sigmoid SURGICAL PATHOLOGY EXAM Jaimee Barrios MD 9/19/2019  4:29 PM      Findings:   sigmoid diverticulitis with abscess and dense adhesions to abdominal wall, omentum and small bowel.  Complications: None.  Implants:  * No implants in log *

## 2019-09-20 LAB
ANION GAP SERPL CALCULATED.3IONS-SCNC: 5 MMOL/L (ref 3–14)
BUN SERPL-MCNC: 13 MG/DL (ref 7–30)
CALCIUM SERPL-MCNC: 8.9 MG/DL (ref 8.5–10.1)
CHLORIDE SERPL-SCNC: 104 MMOL/L (ref 94–109)
CO2 SERPL-SCNC: 26 MMOL/L (ref 20–32)
CREAT SERPL-MCNC: 1.08 MG/DL (ref 0.66–1.25)
ERYTHROCYTE [DISTWIDTH] IN BLOOD BY AUTOMATED COUNT: 12.8 % (ref 10–15)
GFR SERPL CREATININE-BSD FRML MDRD: 77 ML/MIN/{1.73_M2}
GLUCOSE SERPL-MCNC: 175 MG/DL (ref 70–99)
HCT VFR BLD AUTO: 33.8 % (ref 40–53)
HGB BLD-MCNC: 10.8 G/DL (ref 13.3–17.7)
INTERPRETATION ECG - MUSE: NORMAL
MAGNESIUM SERPL-MCNC: 1.6 MG/DL (ref 1.6–2.3)
MCH RBC QN AUTO: 28.9 PG (ref 26.5–33)
MCHC RBC AUTO-ENTMCNC: 32 G/DL (ref 31.5–36.5)
MCV RBC AUTO: 90 FL (ref 78–100)
PHOSPHATE SERPL-MCNC: 2.3 MG/DL (ref 2.5–4.5)
PLATELET # BLD AUTO: 429 10E9/L (ref 150–450)
POTASSIUM SERPL-SCNC: 4.6 MMOL/L (ref 3.4–5.3)
RBC # BLD AUTO: 3.74 10E12/L (ref 4.4–5.9)
SODIUM SERPL-SCNC: 135 MMOL/L (ref 133–144)
WBC # BLD AUTO: 9.3 10E9/L (ref 4–11)

## 2019-09-20 PROCEDURE — 25000132 ZZH RX MED GY IP 250 OP 250 PS 637: Performed by: SURGERY

## 2019-09-20 PROCEDURE — 36415 COLL VENOUS BLD VENIPUNCTURE: CPT | Performed by: SURGERY

## 2019-09-20 PROCEDURE — 84100 ASSAY OF PHOSPHORUS: CPT | Performed by: SURGERY

## 2019-09-20 PROCEDURE — 83735 ASSAY OF MAGNESIUM: CPT | Performed by: SURGERY

## 2019-09-20 PROCEDURE — 25000128 H RX IP 250 OP 636: Performed by: PHYSICIAN ASSISTANT

## 2019-09-20 PROCEDURE — 85027 COMPLETE CBC AUTOMATED: CPT | Performed by: SURGERY

## 2019-09-20 PROCEDURE — C9113 INJ PANTOPRAZOLE SODIUM, VIA: HCPCS | Performed by: PHYSICIAN ASSISTANT

## 2019-09-20 PROCEDURE — 25800025 ZZH RX 258: Performed by: SURGERY

## 2019-09-20 PROCEDURE — 40000904 ZZH STATISTIC WOC PT EDUCATION, 46-60 MIN

## 2019-09-20 PROCEDURE — 25000128 H RX IP 250 OP 636: Performed by: SURGERY

## 2019-09-20 PROCEDURE — 12000000 ZZH R&B MED SURG/OB

## 2019-09-20 PROCEDURE — 80048 BASIC METABOLIC PNL TOTAL CA: CPT | Performed by: SURGERY

## 2019-09-20 RX ORDER — NALOXONE HYDROCHLORIDE 0.4 MG/ML
.1-.4 INJECTION, SOLUTION INTRAMUSCULAR; INTRAVENOUS; SUBCUTANEOUS
Status: DISCONTINUED | OUTPATIENT
Start: 2019-09-20 | End: 2019-09-25 | Stop reason: HOSPADM

## 2019-09-20 RX ORDER — DIAZEPAM 5 MG
5 TABLET ORAL EVERY 6 HOURS PRN
Status: DISCONTINUED | OUTPATIENT
Start: 2019-09-20 | End: 2019-09-25 | Stop reason: HOSPADM

## 2019-09-20 RX ORDER — PANTOPRAZOLE SODIUM 40 MG/10ML
40 INJECTION, POWDER, LYOPHILIZED, FOR SOLUTION INTRAVENOUS EVERY 24 HOURS
Status: DISCONTINUED | OUTPATIENT
Start: 2019-09-20 | End: 2019-09-23

## 2019-09-20 RX ORDER — KETOROLAC TROMETHAMINE 15 MG/ML
15 INJECTION, SOLUTION INTRAMUSCULAR; INTRAVENOUS EVERY 6 HOURS
Status: COMPLETED | OUTPATIENT
Start: 2019-09-20 | End: 2019-09-23

## 2019-09-20 RX ADMIN — ACETAMINOPHEN 975 MG: 325 TABLET, FILM COATED ORAL at 22:25

## 2019-09-20 RX ADMIN — KETOROLAC TROMETHAMINE 15 MG: 15 INJECTION, SOLUTION INTRAMUSCULAR; INTRAVENOUS at 14:30

## 2019-09-20 RX ADMIN — DEXTROSE AND SODIUM CHLORIDE: 5; 450 INJECTION, SOLUTION INTRAVENOUS at 08:54

## 2019-09-20 RX ADMIN — ACETAMINOPHEN 975 MG: 325 TABLET, FILM COATED ORAL at 14:27

## 2019-09-20 RX ADMIN — MEROPENEM 1 G: 1 INJECTION, POWDER, FOR SOLUTION INTRAVENOUS at 03:19

## 2019-09-20 RX ADMIN — ACETAMINOPHEN 975 MG: 325 TABLET, FILM COATED ORAL at 06:03

## 2019-09-20 RX ADMIN — ONDANSETRON HYDROCHLORIDE 4 MG: 2 INJECTION, SOLUTION INTRAMUSCULAR; INTRAVENOUS at 06:12

## 2019-09-20 RX ADMIN — HYDROMORPHONE HYDROCHLORIDE 0.5 MG: 1 INJECTION, SOLUTION INTRAMUSCULAR; INTRAVENOUS; SUBCUTANEOUS at 04:05

## 2019-09-20 RX ADMIN — Medication: at 08:48

## 2019-09-20 RX ADMIN — HYDROMORPHONE HYDROCHLORIDE 0.5 MG: 1 INJECTION, SOLUTION INTRAMUSCULAR; INTRAVENOUS; SUBCUTANEOUS at 06:03

## 2019-09-20 RX ADMIN — HYDROMORPHONE HYDROCHLORIDE 0.5 MG: 1 INJECTION, SOLUTION INTRAMUSCULAR; INTRAVENOUS; SUBCUTANEOUS at 01:58

## 2019-09-20 RX ADMIN — FLUCONAZOLE 400 MG: 400 INJECTION, SOLUTION INTRAVENOUS at 11:24

## 2019-09-20 RX ADMIN — DEXTROSE AND SODIUM CHLORIDE: 5; 450 INJECTION, SOLUTION INTRAVENOUS at 22:25

## 2019-09-20 RX ADMIN — DIAZEPAM 5 MG: 5 TABLET ORAL at 20:01

## 2019-09-20 RX ADMIN — MEROPENEM 1 G: 1 INJECTION, POWDER, FOR SOLUTION INTRAVENOUS at 10:31

## 2019-09-20 RX ADMIN — ENOXAPARIN SODIUM 40 MG: 40 INJECTION SUBCUTANEOUS at 11:30

## 2019-09-20 RX ADMIN — MEROPENEM 1 G: 1 INJECTION, POWDER, FOR SOLUTION INTRAVENOUS at 18:49

## 2019-09-20 RX ADMIN — KETOROLAC TROMETHAMINE 15 MG: 15 INJECTION, SOLUTION INTRAMUSCULAR; INTRAVENOUS at 08:01

## 2019-09-20 RX ADMIN — PANTOPRAZOLE SODIUM 40 MG: 40 INJECTION, POWDER, FOR SOLUTION INTRAVENOUS at 11:06

## 2019-09-20 RX ADMIN — DIAZEPAM 5 MG: 5 TABLET ORAL at 10:32

## 2019-09-20 RX ADMIN — KETOROLAC TROMETHAMINE 15 MG: 15 INJECTION, SOLUTION INTRAMUSCULAR; INTRAVENOUS at 20:02

## 2019-09-20 ASSESSMENT — ACTIVITIES OF DAILY LIVING (ADL)
ADLS_ACUITY_SCORE: 14
ADLS_ACUITY_SCORE: 15

## 2019-09-20 NOTE — ANESTHESIA POSTPROCEDURE EVALUATION
Patient: Jim Mixon    Procedure(s):  LAPAROSCOPY-ASSISTED, SIGMOIDCOLECTOMY WITH END COLOSTOMY  PREOPERATIVE BILATERAL URETERAL STENT PLACEMENT    Diagnosis:Diverticulitis  Diagnosis Additional Information: No value filed.    Anesthesia Type:  General, ETT    Note:  Anesthesia Post Evaluation    Patient location during evaluation: PACU  Patient participation: Able to fully participate in evaluation  Level of consciousness: awake  Pain management: adequate  Airway patency: patent  Cardiovascular status: acceptable  Respiratory status: acceptable  Hydration status: acceptable  PONV: controlled     Anesthetic complications: None          Last vitals:  Vitals:    09/19/19 1915 09/19/19 1930 09/19/19 1945   BP: (!) 121/91 (!) 141/92 132/80   Pulse: 119 118    Resp: 19 26 8   Temp:      SpO2: 95% 97% 96%         Electronically Signed By: Jayme Spring MD  September 19, 2019  8:04 PM

## 2019-09-20 NOTE — CONSULTS
CTS identifies patient as high risk due to elevated PORSHA score. Currently admitted for laparoscopic-assisted  sigmoid colectomy with end descending colostomy, this is her 3rd admission in 6 months. Per chart review, pt resides at home with her . Baseline mobility is independent.    Her PTA medications that can effect her PORSHA score include Oxycodone and Lorazepam.      Will follow along with SW for DC planning. No hand-off needed as patient does not have any gaps in care identified. General surgery follow up with arranged at discharge.      Vandana Rouse RN, BSN, CTS  [facility]  [phone]

## 2019-09-20 NOTE — CONSULTS
CLINICAL NUTRITION SERVICES  -  ASSESSMENT NOTE    Recommendations Ordered by Registered Dietitian (RD):   Advance diet per MD   Ordered new weight   Malnutrition:   % Weight Loss: Difficult to assess --> will order new weight when pt available   % Intake:  </= 50% for >/= 1 month (severe malnutrition)  Subcutaneous Fat Loss:  Orbital region mild/moderate depletion, Upper arm region mild  depletion and Thoracic region mild/moderate depletion  Muscle Loss:  Temporal region mild depletion, Clavicle bone region mild depletion and Anterior thigh region mild depletion  Fluid Retention:  None noted    Malnutrition Diagnosis: Non-Severe malnutrition  In Context of:  Acute illness or injury      REASON FOR ASSESSMENT  Jim Mixon is a 54 year old male seen by Registered Dietitian for Admission Nutrition Risk Screen for unintentional loss of 10# or more in the past two months and reduced oral intake over the last month and Provider Order - Nutrition Education - Low residue diet teaching.     NUTRITION HISTORY  - Information obtained from patient and chart.   - Pt admitted for abdominal pain, nausea/vomiting and low appetite   - Pertinent history of recurrent diverticulitis   - Prior to admission pt stated that he was not eating well for about a month at a time due to increased nausea and pain. Likely consuming less than 50% of nutritional needs during this time.  Typical Intake:   - Breakfast: coffee with cream, cookies/baked goods  - Lunch: sandwich with conner and cheese sometimes beef  - Dinner: varies. Pasta, pizza, hamburger or soup   - Snack: sandwich, fries or chocolate with cake/croissants with jam   - Food allergies to apples and pears per his recall     CURRENT NUTRITION ORDERS  Diet Order: NPO     Current Intake/Tolerance: Pt is NPO.     NUTRITION FOCUSED PHYSICAL ASSESSMENT FOR DIAGNOSING MALNUTRITION)  Completed:  Yes Full assessment         Observed:    Muscle wasting (refer to documentation in  "Malnutrition section) and Subcutaneous fat loss (refer to documentation in Malnutrition section)    Obtained from Chart/Interdisciplinary Team:  Per the H&P: \"He reports his appetite continues to be poor.  He really is not eating much at all.  He is having some nausea and has vomited in the last week.  He continues to have stools sometimes diarrhea and has noticed in the last 4 to 5 days that these are typically more painful.\"     On 9/19 pt underwent laparoscopic-assisted sigmoid colectomy with end colostomy.    ANTHROPOMETRICS  Height: 5' 7\"  Weight: 63.5 kg ( 140 lbs 0 oz)   Body mass index is 21.93 kg/m .  Weight Status:  Normal BMI  IBW: 67.3 kg (148 lbs)  % IBW: 94% +/- 10%  Weight History: Per the weight history weight is trending down. Question accuracy of weights, reported?? If accurate, weight is down 13.2 kg (29 lbs) in 2 days (likely not accurate). Will request reweigh. Pt noticed that he has lost some weight over the last months, usual body weight is 170 lbs.   Per care everywhere:  12/18/18: 155 lbs  Wt Readings from Last 10 Encounters:   09/20/19 63.5 kg (140 lb)   09/18/19 76.7 kg (169 lb)   09/16/19 76.7 kg (169 lb)   09/04/19 76.7 kg (169 lb)   09/04/19 76.7 kg (169 lb)   08/19/19 76.8 kg (169 lb 6.4 oz)     LABS  Labs reviewed    Recent Labs   Lab Test 09/20/19  0653 09/19/19  0735 09/18/19  1121 09/07/19  0748 09/04/19  1913   POTASSIUM 4.6 4.6 4.8 4.4 4.4     Recent Labs   Lab Test 09/20/19  0653 09/18/19  1121   PHOS 2.3* 2.7     Recent Labs   Lab Test 09/20/19  0653 09/18/19  1121 08/21/19  0727 08/19/19  0756   MAG 1.6 1.9 1.9 1.7     Recent Labs   Lab Test 09/20/19  0653 09/19/19  0735 09/18/19  1121 09/07/19  0748 09/04/19 1913    135 133 138 134     Recent Labs   Lab Test 09/20/19  0653 09/19/19  0735 09/18/19  1121 09/07/19  0748 09/04/19 1913   CR 1.08 1.22 1.48* 1.05 1.01     Recent Labs   Lab 09/20/19  0653 09/19/19  0735 09/18/19  1121   * 83 105* "     MEDICATIONS  Medications reviewed    IVF: D5 @ 100 mL/hr     ASSESSED NUTRITION NEEDS PER APPROVED PRACTICE GUIDELINES:    Dosing Weight 76.7 kg (based on previous weights per trends)   Estimated Energy Needs: 7040-9574 kcals (25-30 Kcal/Kg)  Justification: maintenance  Estimated Protein Needs: 77-92+ grams protein (1-1.2+ g pro/Kg)  Justification: maintenance and preservation of lean body mass  Estimated Fluid Needs: >1 mL/Kcal  Justification: maintenance    MALNUTRITION:  % Weight Loss: Difficult to assess --> will order new weight when pt available   % Intake:  </= 50% for >/= 1 month (severe malnutrition)  Subcutaneous Fat Loss:  Orbital region mild/moderate depletion, Upper arm region mild  depletion and Thoracic region mild/moderate depletion  Muscle Loss:  Temporal region mild depletion, Clavicle bone region mild depletion and Anterior thigh region mild depletion  Fluid Retention:  None noted    Malnutrition Diagnosis: Non-Severe malnutrition  In Context of:  Acute illness or injury    NUTRITION DIAGNOSIS:  Inadequate oral intake related to increased nausea/vomiting, pain and poor appetite as evidenced by pt likely consuming <50% of nutritional needs prior to admission for the last month and noted muscle/fat loss.    NUTRITION INTERVENTIONS  Recommendations / Nutrition Prescription  Advance diet per MD   Ordered new weight    Implementation  Nutrition education: will defer low residue diet education to a later date, pt NPO   Collaboration and Referral of Nutrition care: discussed pt during interdisciplinary rounds this morning     Nutrition Goals  Pt to advance to >NPO/clears within the next 24-48 hours    MONITORING AND EVALUATION:  Progress towards goals will be monitored and evaluated per protocol and Practice Guidelines    Ashley Aparicio RD, LD  Clinical Dietitian

## 2019-09-20 NOTE — OP NOTE
Operative report  Date of service: 9/19/2019  Staff Surgeon: Jaimee Barrios MD  First assist: Desiree Flores PA-C  PREOPERATIVE  DIAGNOSIS: Complicated sigmoid diverticulitis with microperforation and abscess  POSTOPERATIVE  DIAGNOSIS:  Same  PROCEDURE:    1.  Laparoscopic-assisted  sigmoid colectomy with end descending colostomy.    2.  Complete  takedown  of  splenic  flexure   3.  Ureteral  stent  placement  by  Urology  ANESTHESIA:  General  endotracheal  anesthesia  INDICATIONS  FOR  OPERATION:     Jim Mixon is a 54 year old male who presented with a history of recurrent sigmoid diverticulitis.  He was initially admitted on 8/19/2019 with complicated diverticulitis.  CT scan showed a very small amount of free air within the mesentery and stranding consistent with possible early abscess.  He clinically improved with IV antibiotics and bowel rest and was discharged on 8/22/19.  This was a second episode of diverticulitis.  His previous episode of diverticulitis was approximately 5 years ago which occurred after he had his for screening colonoscopy at the age of 49.  He was readmitted on 9/4/19 with increasing LLQ pain, CT was performed  and revealed worsening diverticulitis with multiple abscesses. He underwent CT guided diverticular abscess drainage on 9/5.   Repeat CT on 9/8 showed drain adequately controlling abscess, and stable intramural abscess and inflammation. Patient was discharged home 9/10/19 with drain in place, as well as Bactrim, Augmentin, Fluconazole for 2 weeks per ID recommendations based on drain cultures.  On return to clinic 9/18/2019, he was clinically significantly worse than the day of discharge and repeat CT scan on the day prior showed no improvement in size of the intramural abscess.  Patient had been unable to take really any significant p.o. since discharge the week before and was having severe pain with bowel movements as well as feculent drain output.  Given these we  discussed proceeding with more urgent laparoscopic  sigmoid  colectomy with ostomy creation, as he is unable to get a bowel prep.  He understands there are risks  to  include,  but  not  be  limited  to:  infection,  bleeding,  inadvertent  bowel  injury,  ureteral  injury,  the  need  for  further  surgery,  as  well  as  other  complications.  They  understand  that  my  goal  is  to  perform  a  safe  surgery  appropriate  for  their  diagnosis  and  if  I  feel  either  of  those  two  things  are  being  compromised,  we  will  convert  to  an  open  procedure  and  do  whatever  is  necessary  to  take  care  of  their  problem.  The  patient  understands  and  accepts  these  risks  and  wishes  to  Proceed.  DESCRIPTION  OF  PROCEDURE: The  patient  was  brought  to  the  Operating  Room, placed  in  the  supine  position,  general  anesthesia  was  administered,  and  the  patient  was  intubated  by  Anesthesia  without  difficulty.   Ureteral  stents  and  a  swan  catheter  were  placed  in  the  usual  fashion  with  sterile  technique  by  Urology  -  see  their  dictation  for  Details. Pressure points were padded and he was secured to the table with a safety strap   Sterile  prep  and  drape  was  then  done  in  the  usual  fashion.  Entrance  into  the  abdomen  was  gained  via  an  open  Technique just above the umbilicus;  no  bowel  injury.  We  made  a  7  cm midline  Incision below the umbilicus.    The  Jonathan  wound  Retractor and GelPort were placed,  insufflated  the  abdomen  with  carbon  dioxide  thru  the  supraumbilical  trocar.   We  placed  a  12-mm  RLQ  trocar  and  a  5-mm  R lateral abdomen  trocar.  All  trocars  were  placed  under  direct  visualization  without  bowel  injury.  The  table  was  then  rotated  to  the  right  and  put  initially  in  the  Trendelenberg  Position.    The omentum was noted to be adhesed over top of the sigmoid colon.  I was unable to take  this down laparoscopically. The gelport was removed and we proceeded to lyse omental adhesions in an open fashion using cautery and ligasure.  The sigmoid colon was densely adhered to the left lateral and anterior peritoneum. We dissected this off with blunt finger fracture and sharp dissection, eventually using a 15 blade to cut through dense scar and an abscess cavity to free the colon laterally.  Feculant abscess contents were suctioned out. Loop of small bowel was stuck to the sigmoid colon medially and this was sharply dissected off with Metzenbaum scissors.  There was no evidence of serosal tear or bowel injury.  The distal resection margin was then chosen at the rectosigmoid junction and cleared  a  mesenteric  window  at  that  point  with  blunt  finger  dissection - the bowel was transected with a contour stapler with a blue load .  Lateral and medial portion of the staple line were marked with Prolene sutures.  The mesentery was ligated with LigaSure up towards the descending colon and 2-0 Vicryl ties were used where appropriate..  The entire sigmoid colon was involved with inflammation.  We then replaced the GelPort component and insufflated the abdomen and proceeded with laparoscopic takedown of the splenic flexure.  We  were then able  to  rotate  the  small  intestine  into  the  right  upper  quadrant  and  then  we  freed  up  the remaining retroperitoneal  attachments  of  the  sigmoid  and  descending  without  difficulty  using  ligasure and  my  hand  thru  the  GelPort.  No  evidence  of  ureteral  injury.  An additional 5 mm port was placed in the left lower quadrant. I  was  able  to  completely  take  down  the  splenic  flexure  and  rotate  the  colon  to  the  midline  to  aid  in  the creation of end descending colostomy.    At  this  point,  with  complete  mobilization  of  the  descending  and  sigmoid  colon  (as  above),  we  were  able  to  extract  the  colon  thru  the  GelPort   site  and  complete  the  procedure  in  an  open  fashion.    We  then  identified  our  proximal  location  for  resection.  We  chose  a  soft,  pliable  piece  of  descending  colon  upstream  to  the  area  of  pathology,  and  cleared  a  mesenteric  window  at  that  point  with  blunt  finger  dissection  and  cautery  and  fired  a  single  load  of  a  LACIE 75  mm  stapler  with  a  blue  load  to  divide  the  bowel.    The specimen was passed off the field as sigmoid colon.    The abdomen was then irrigated with 2 L of warm saline.  Hemostasis was assured.    Left lower quadrant where patient has been preoperatively marked for a stoma was grasped and on the disc of skin was removed with cautery.  Subcutaneous tissue was dissected down to the anterior fascia, which was incised in a cruciate fashion.  Rectus muscles were spread bluntly and posterior fascia was incised in a cruciate fashion.  Approximately 2 finger widths fit through the defect.  The transected descending colon was then brought out the ostomy site.    The fascia of the lower midline incision was then closed with running 0 Prolene.  The 12 mm port sites were closed with interrupted 0 Vicryl sutures.  Skin of all the incisions was closed with subcuticular 4-0 Vicryl and covered with sterile dressings.    The ostomy was then matured.  The staple line was removed with cautery.  3-0 Vicryl interrupted mucocutaneous sutures were placed at 12, 3, 6 and 9:00 positions incorporating a seromuscular bite on the colon to see the stoma nicely. Further interrupted mucocutaneous sutures were placed around the stoma. An ostomy appliance was placed.    The patient was then awoken from anesthesia and transferred to the PACU in stable condition.  Sponge and needle counts were correct x2 prior to completion of the procedure.    Findings: Sigmoid diverticulitis with mesenteric abscess cavity containing feculent fluid.  No free purulence or feculent  peritonitis.  Sigmoid was densely adhered to the lateral and anterior abdominal wall.  A short segment of small bowel was also adhesed to the medial sigmoid colon which was taken down sharply.  An end colostomy was created given these findings and inability to give the patient a preoperative bowel prep due to his severe symptoms.  Estimated blood loss: 100 mL  Drains: none  Complications: None    Jaimee Barrios MD'

## 2019-09-20 NOTE — PLAN OF CARE
Pt repeatedly c/o burning, urge to void. Explained to pt. He hs a catheter. Much reassurance given. Ross catheter patent and draining dark tea/red toned urine. Urojet Lidocaine applied at urethra tip. Dr. Clementine MD updated and aware. Medicated for pain as in talking with pt, pt describes lower abd. pressure and need to urinate.

## 2019-09-20 NOTE — PROGRESS NOTES
"[facility] Nurse Inpatient Ostomy Assessment      Assessment of ostomy and needs for:  End  Colostomy      Data:   History:      Per MD note(s):  54 year old male with a PMH of diverticulitis, admitted to the hospital with worsening complicated diverticulitis and need for urgent sigmoid colectomy.  At this point he remains hemodynamically stable without peritonitis.   DX: complicated sigmoid diverticulitis with microperforation and abscess.   Procedure:   1.Laparoscopic-assisted  sigmoid colectomy with end descending colostomy.    2.  Complete  takedown  of  splenic  flexure   3.  Ureteral  stent  placement  by  Urology      Type of ostomy:  Colostomy  Stoma assessment:   ? Size of stoma:  Seen thru pouch about 1 1/4\" round pink red and moist with good protrusion, slight edema,    Mucocutaneous Junction (MCJ):  not visualized (barrier in place)  Peristomal skin:  not visualized (barrier in place)  Abdominal assessment: midline dressing in place  ? N/G still in place?:   No, pt has hiccups  Output:  scant , serosanguinous, no flatus,     I/O last 3 completed shifts:  In: 4667 [I.V.:4567; IV Piggyback:100]  Out: 2400 [Urine:2400]  Current pouching system:  Shelia,  one piece, flat and placed last night in OR, tape is over dressing and will need to be removed    Pain: tender   ? Is pt still on a PCA? Yes    Diet:         Orders Placed This Encounter        NPO for Medical/Clinical Reasons Except for: Meds, Ice Chips      Labs:   Recent Labs   Lab Test 09/20/19  0653  09/04/19  1913  08/19/19  0756   ALBUMIN  --   --   --   --  4.2   HGB 10.8*   < > 12.8*   < > 15.6   INR  --   --  1.06  --   --    WBC 9.3   < > 11.0   < > 11.4*    < > = values in this interval not displayed.           Intervention:     Patient's chart evaluated.      Assessments done today:  Stoma and pouch    Education: begun, intro to [facility] role, pouch change, discussed products, skin care, activities at home with pt and " spouse    Prepared for discharge by: Supplies ordered, Prescriptions or note left on chart for MD to sign/complete, Discussed when to follow up with a WOC Nurse in the future, Discussed how/ where to order supplies and ansewered all questions    Pt registered for ostomy supply samples? Yes: on discharge         Assessment:     Stoma assessment: viable, pink-red, round, moist and non functioning 1 day post op    Learning needs/ comprehension: no prior experience, pt works with quartz and granite counter tops, children are in [place] visiting family this summer and spouse will need to return to [facility] to retrieve them. Ages 1 and 2.    Effectiveness of current pouching/ supply plan: TBD intact 1 day         Plan:     Plan:   ? Current pouching system: Silentium    Education:  ? Education continued thru hospital stay:  Pouch Emptying and Pouch Replacement, How to empty pouch, Removal of pouch, Preparation of new pouch, Cutting out or evaluating a pattern, Applying paste or rings, Applying appliance to abdomen, Peristomal skin care, Diet and hydration / fluid balance, Odor / flatus management and Lifestyle Adjustments   o Supply company: pt will call insurance company  o Do WOC Nurse recommend home care ? Suspect pt will want support when discharged

## 2019-09-20 NOTE — PLAN OF CARE
Alert and oriented. Complains of pain throughout shift. Taking Dilaudid every 2 hours. Did dangle and stand at bedside with assist of 2. Colostomy with pink stoma. Small amount serosanguinous drainage, no stool or gas yet. Instructed on IS, did well, up to 1250.

## 2019-09-20 NOTE — PLAN OF CARE
Patient POD # 0 from lap sig colectomy, A&Ox4, Ax2, VSS, IV dilaudid given x1 for pain,on scheduled Tylenol capno in place, on O2 2 LPM, LS clear. Colostomy in place intact pink in color, no drainage, ice applied, lap sites dry. No BS, denies passing flatus, Ross cathter in place draining without difficulty. NPO with ice chips, IVF running. Surgery following. Disposition TBD.

## 2019-09-20 NOTE — PROGRESS NOTES
[facility]   General Surgery Progress Note          Assessment and Plan:   Assessment:   POD#1 s/p laparoscopic-assisted sigmoid colectomy with colostomy  Post-operative ileus as expected      Plan:   -Continue NPO  -IV ABX: Meropenem and diflucan  -VTE PPX: Lovenox, PCDs  -Pain mgmt: Dilaudid PCA, tylenol  -Increase activity as tolerated, up in chair and walk   -Await return of bowel function         Interval History:   Resting in bed. States his whole body hurts. He tried standing at bedside to get his weight measured and he had quite a bit of pain. PCA just implemented. Feeling better. +NPO, +swan, no flatus in bag.          Physical Exam:   Blood pressure 134/78, pulse 118, temperature 97.6  F (36.4  C), temperature source Oral, resp. rate 12, weight 63.5 kg (140 lb), SpO2 96 %.    I/O last 3 completed shifts:  In: 4667 [I.V.:4567; IV Piggyback:100]  Out: 2400 [Urine:2400]    Abdomen: soft, ND, +diffusely tender, rare BS  Inc(s) - bandages clean, dry, intact  Colostomy - stoma pink/viable, serosang fluid in bag, no flatus           Data:     Recent Labs   Lab Test 09/20/19  0653 09/19/19  0735 09/18/19  1121   HGB 10.8* 11.8* 14.2   WBC 9.3 6.4 14.3*          Atul Merchant PA-C

## 2019-09-20 NOTE — PLAN OF CARE
Ambulatory status:Assist of 1 with walker  VS:B/P: 134/78, T: 97.6, P: 118, R: 12   CV:Tachy but denies chest pain, shortness of breath  Neuro:A&O x 4, pleasant  Pain:Pt started on dilaudid PCA this morning, PCA controlling pain  Resp:WDL  GI:Ross cath in place, draining  :No stool or gas in colostomy yet. BS hypoactive  Skin:Surgical incisions dressed with steri-strips, clean dry intact  Treatment:Pain control, IV antibiotics,  Disposition:TBD

## 2019-09-21 LAB
ANION GAP SERPL CALCULATED.3IONS-SCNC: 2 MMOL/L (ref 3–14)
BASOPHILS # BLD AUTO: 0 10E9/L (ref 0–0.2)
BASOPHILS NFR BLD AUTO: 0.1 %
BUN SERPL-MCNC: 17 MG/DL (ref 7–30)
CALCIUM SERPL-MCNC: 8.7 MG/DL (ref 8.5–10.1)
CHLORIDE SERPL-SCNC: 108 MMOL/L (ref 94–109)
CO2 SERPL-SCNC: 28 MMOL/L (ref 20–32)
CREAT SERPL-MCNC: 1.15 MG/DL (ref 0.66–1.25)
DIFFERENTIAL METHOD BLD: ABNORMAL
EOSINOPHIL # BLD AUTO: 0.1 10E9/L (ref 0–0.7)
EOSINOPHIL NFR BLD AUTO: 1.3 %
ERYTHROCYTE [DISTWIDTH] IN BLOOD BY AUTOMATED COUNT: 13.1 % (ref 10–15)
GFR SERPL CREATININE-BSD FRML MDRD: 71 ML/MIN/{1.73_M2}
GLUCOSE SERPL-MCNC: 114 MG/DL (ref 70–99)
HCT VFR BLD AUTO: 25 % (ref 40–53)
HGB BLD-MCNC: 8 G/DL (ref 13.3–17.7)
IMM GRANULOCYTES # BLD: 0.1 10E9/L (ref 0–0.4)
IMM GRANULOCYTES NFR BLD: 0.7 %
LYMPHOCYTES # BLD AUTO: 1.3 10E9/L (ref 0.8–5.3)
LYMPHOCYTES NFR BLD AUTO: 15.7 %
MCH RBC QN AUTO: 29.5 PG (ref 26.5–33)
MCHC RBC AUTO-ENTMCNC: 32 G/DL (ref 31.5–36.5)
MCV RBC AUTO: 92 FL (ref 78–100)
MONOCYTES # BLD AUTO: 0.7 10E9/L (ref 0–1.3)
MONOCYTES NFR BLD AUTO: 8.2 %
NEUTROPHILS # BLD AUTO: 6.3 10E9/L (ref 1.6–8.3)
NEUTROPHILS NFR BLD AUTO: 74 %
NRBC # BLD AUTO: 0 10*3/UL
NRBC BLD AUTO-RTO: 0 /100
PLATELET # BLD AUTO: 316 10E9/L (ref 150–450)
POTASSIUM SERPL-SCNC: 4.7 MMOL/L (ref 3.4–5.3)
RBC # BLD AUTO: 2.71 10E12/L (ref 4.4–5.9)
SODIUM SERPL-SCNC: 138 MMOL/L (ref 133–144)
WBC # BLD AUTO: 8.5 10E9/L (ref 4–11)

## 2019-09-21 PROCEDURE — 80048 BASIC METABOLIC PNL TOTAL CA: CPT | Performed by: SURGERY

## 2019-09-21 PROCEDURE — 85025 COMPLETE CBC W/AUTO DIFF WBC: CPT | Performed by: SURGERY

## 2019-09-21 PROCEDURE — C9113 INJ PANTOPRAZOLE SODIUM, VIA: HCPCS | Performed by: PHYSICIAN ASSISTANT

## 2019-09-21 PROCEDURE — 36415 COLL VENOUS BLD VENIPUNCTURE: CPT | Performed by: SURGERY

## 2019-09-21 PROCEDURE — 25000128 H RX IP 250 OP 636: Performed by: SURGERY

## 2019-09-21 PROCEDURE — 25000128 H RX IP 250 OP 636: Performed by: PHYSICIAN ASSISTANT

## 2019-09-21 PROCEDURE — 12000000 ZZH R&B MED SURG/OB

## 2019-09-21 PROCEDURE — 25800025 ZZH RX 258: Performed by: SURGERY

## 2019-09-21 PROCEDURE — 25000132 ZZH RX MED GY IP 250 OP 250 PS 637: Performed by: SURGERY

## 2019-09-21 RX ADMIN — MEROPENEM 1 G: 1 INJECTION, POWDER, FOR SOLUTION INTRAVENOUS at 11:15

## 2019-09-21 RX ADMIN — KETOROLAC TROMETHAMINE 15 MG: 15 INJECTION, SOLUTION INTRAMUSCULAR; INTRAVENOUS at 07:44

## 2019-09-21 RX ADMIN — ACETAMINOPHEN 975 MG: 325 TABLET, FILM COATED ORAL at 05:56

## 2019-09-21 RX ADMIN — KETOROLAC TROMETHAMINE 15 MG: 15 INJECTION, SOLUTION INTRAMUSCULAR; INTRAVENOUS at 14:11

## 2019-09-21 RX ADMIN — DEXTROSE AND SODIUM CHLORIDE: 5; 450 INJECTION, SOLUTION INTRAVENOUS at 22:38

## 2019-09-21 RX ADMIN — KETOROLAC TROMETHAMINE 15 MG: 15 INJECTION, SOLUTION INTRAMUSCULAR; INTRAVENOUS at 02:07

## 2019-09-21 RX ADMIN — ACETAMINOPHEN 975 MG: 325 TABLET, FILM COATED ORAL at 21:11

## 2019-09-21 RX ADMIN — MEROPENEM 1 G: 1 INJECTION, POWDER, FOR SOLUTION INTRAVENOUS at 02:18

## 2019-09-21 RX ADMIN — PANTOPRAZOLE SODIUM 40 MG: 40 INJECTION, POWDER, FOR SOLUTION INTRAVENOUS at 11:03

## 2019-09-21 RX ADMIN — DEXTROSE AND SODIUM CHLORIDE: 5; 450 INJECTION, SOLUTION INTRAVENOUS at 09:07

## 2019-09-21 RX ADMIN — ENOXAPARIN SODIUM 40 MG: 40 INJECTION SUBCUTANEOUS at 11:19

## 2019-09-21 RX ADMIN — MEROPENEM 1 G: 1 INJECTION, POWDER, FOR SOLUTION INTRAVENOUS at 18:55

## 2019-09-21 RX ADMIN — KETOROLAC TROMETHAMINE 15 MG: 15 INJECTION, SOLUTION INTRAMUSCULAR; INTRAVENOUS at 21:10

## 2019-09-21 RX ADMIN — FLUCONAZOLE 400 MG: 400 INJECTION, SOLUTION INTRAVENOUS at 11:56

## 2019-09-21 RX ADMIN — ACETAMINOPHEN 975 MG: 325 TABLET, FILM COATED ORAL at 14:10

## 2019-09-21 ASSESSMENT — ACTIVITIES OF DAILY LIVING (ADL)
ADLS_ACUITY_SCORE: 15
ADLS_ACUITY_SCORE: 13
ADLS_ACUITY_SCORE: 15
ADLS_ACUITY_SCORE: 15
ADLS_ACUITY_SCORE: 13
ADLS_ACUITY_SCORE: 15

## 2019-09-21 NOTE — PLAN OF CARE
Pt up SBA. Alert & oriented x4. VSS. 2LO2. PCA pump for pain control, total 2.2 mg dilaudid given. BS hypoactive, not passing gas, minimal serosanguineous output. Denies nausea. WOC & surgery following.

## 2019-09-21 NOTE — PROGRESS NOTES
VSS, Off capn, on 2L for support, PCA pump,   swan out tonight ar 2015 and patient DTV.   Walked in the [address]  Stoma pink  With protrusion.   Valium for spasms   Hypoactive bowel sounds

## 2019-09-21 NOTE — PLAN OF CARE
Ambulatory status: Standby assist   VS:/63 (BP Location: Left arm)   Pulse 118   Temp 97.1  F (36.2  C) (Oral)   Resp 16   Wt 63.5 kg (140 lb)   SpO2 95%   BMI 21.93 kg/m     CV:WDL  Neuro:A&O x4  Pain:Controlled with   Resp:LS clear,titrated down to 1L, sating around 95%  GI:BS active. Gas and fluid draining from ostomy  Skin:Lap sits dressed and WDL  Treatment:IV antibiotics, pain control  Consults:WOC, colorectal  Disposition:TBD

## 2019-09-21 NOTE — PROGRESS NOTES
[facility]   General Surgery Progress Note          Assessment and Plan:   Assessment:   POD#2 s/p laparoscopic-assisted sigmoid colectomy with colostomy  Post-operative ileus as expected - resolving      Plan:   -Ok to start clears  -IV ABX: Meropenem and diflucan  -VTE PPX: Lovenox, PCDs  -Pain mgmt: Dilaudid PCA, tylenol  -Increase activity as tolerated, up in chair and walk   -Await further return of bowel function         Interval History:   Resting in bed, states he is feeling better today. He has incisional pain when he moves and gets in and out of bed as expected. Ross was removed and he is voiding independently. His appetite is returning. Has some gas pains. Passed some flatus into bag. He was able to walk in the room and down the hallways once yesterday.         Physical Exam:   Blood pressure 110/63, pulse 118, temperature 97.1  F (36.2  C), temperature source Oral, resp. rate 16, weight 63.5 kg (140 lb), SpO2 98 %.    I/O last 3 completed shifts:  In: 1160 [I.V.:1160]  Out: 1175 [Urine:1175]    Abdomen: soft, ND, +mild diffuse tenderness, +BS  Inc(s) - c/d/i, +echymosis, some serosang drainage on bandage (replaced)  Colostomy - stoma pink/viable, serosang fluid in bag, +flatus (audible)          Data:     Recent Labs   Lab 09/21/19  0702 09/20/19  0653 09/19/19  0735   WBC 8.5 9.3 6.4   HGB 8.0* 10.8* 11.8*   HCT 25.0* 33.8* 37.6*   MCV 92 90 92    429 347            Atul Merchant PA-C    Seen and agree,    Kody Meyers MD  [facility]

## 2019-09-22 LAB — PLATELET # BLD AUTO: 332 10E9/L (ref 150–450)

## 2019-09-22 PROCEDURE — 85049 AUTOMATED PLATELET COUNT: CPT | Performed by: SURGERY

## 2019-09-22 PROCEDURE — 25000128 H RX IP 250 OP 636: Performed by: PHYSICIAN ASSISTANT

## 2019-09-22 PROCEDURE — 12000000 ZZH R&B MED SURG/OB

## 2019-09-22 PROCEDURE — 25000132 ZZH RX MED GY IP 250 OP 250 PS 637: Performed by: SURGERY

## 2019-09-22 PROCEDURE — 36415 COLL VENOUS BLD VENIPUNCTURE: CPT | Performed by: SURGERY

## 2019-09-22 PROCEDURE — 25800025 ZZH RX 258: Performed by: SURGERY

## 2019-09-22 PROCEDURE — C9113 INJ PANTOPRAZOLE SODIUM, VIA: HCPCS | Performed by: PHYSICIAN ASSISTANT

## 2019-09-22 PROCEDURE — 25000128 H RX IP 250 OP 636: Performed by: SURGERY

## 2019-09-22 RX ADMIN — PANTOPRAZOLE SODIUM 40 MG: 40 INJECTION, POWDER, FOR SOLUTION INTRAVENOUS at 10:52

## 2019-09-22 RX ADMIN — MEROPENEM 1 G: 1 INJECTION, POWDER, FOR SOLUTION INTRAVENOUS at 19:41

## 2019-09-22 RX ADMIN — KETOROLAC TROMETHAMINE 15 MG: 15 INJECTION, SOLUTION INTRAMUSCULAR; INTRAVENOUS at 08:58

## 2019-09-22 RX ADMIN — KETOROLAC TROMETHAMINE 15 MG: 15 INJECTION, SOLUTION INTRAMUSCULAR; INTRAVENOUS at 21:34

## 2019-09-22 RX ADMIN — KETOROLAC TROMETHAMINE 15 MG: 15 INJECTION, SOLUTION INTRAMUSCULAR; INTRAVENOUS at 14:23

## 2019-09-22 RX ADMIN — FLUCONAZOLE 400 MG: 400 INJECTION, SOLUTION INTRAVENOUS at 11:41

## 2019-09-22 RX ADMIN — Medication: at 19:23

## 2019-09-22 RX ADMIN — DEXTROSE AND SODIUM CHLORIDE: 5; 450 INJECTION, SOLUTION INTRAVENOUS at 08:57

## 2019-09-22 RX ADMIN — MEROPENEM 1 G: 1 INJECTION, POWDER, FOR SOLUTION INTRAVENOUS at 11:00

## 2019-09-22 RX ADMIN — KETOROLAC TROMETHAMINE 15 MG: 15 INJECTION, SOLUTION INTRAMUSCULAR; INTRAVENOUS at 03:00

## 2019-09-22 RX ADMIN — ACETAMINOPHEN 975 MG: 325 TABLET, FILM COATED ORAL at 14:22

## 2019-09-22 RX ADMIN — DEXTROSE AND SODIUM CHLORIDE: 5; 450 INJECTION, SOLUTION INTRAVENOUS at 21:34

## 2019-09-22 RX ADMIN — ACETAMINOPHEN 975 MG: 325 TABLET, FILM COATED ORAL at 06:02

## 2019-09-22 RX ADMIN — MEROPENEM 1 G: 1 INJECTION, POWDER, FOR SOLUTION INTRAVENOUS at 03:00

## 2019-09-22 RX ADMIN — ENOXAPARIN SODIUM 40 MG: 40 INJECTION SUBCUTANEOUS at 11:04

## 2019-09-22 ASSESSMENT — ACTIVITIES OF DAILY LIVING (ADL)
ADLS_ACUITY_SCORE: 13
ADLS_ACUITY_SCORE: 12
ADLS_ACUITY_SCORE: 12

## 2019-09-22 NOTE — PLAN OF CARE
[id number]: Pt up SBA. Ambulated in boo x1. VSS. Weaned to RA. PCA for pain control, total of 1.6 mg dilaudid used. BS hypoactive, passing gas. Small serosanguinous drainage out of ostomy. Surgery following.

## 2019-09-22 NOTE — DOWNTIME EVENT NOTE
The EMR was down for 3 hours on 9/22/2019.    Shae Hilario RN was responsible for completing the paper charting during this time period.     The following information was re-entered into the system by Shae Hilario RN: MAR      The following information will remain in the paper chart: none    Shae Hilario RN  9/22/2019

## 2019-09-22 NOTE — PLAN OF CARE
Ambulatory status:Stand by assist. Pt up and walking halls twice this shift  VS:/83 (BP Location: Right arm)   Pulse 118   Temp 97.5  F (36.4  C) (Oral)   Resp 16   Wt 63.5 kg (140 lb)   SpO2 95%   BMI 21.93 kg/m     Neuro:A&O x4  Pain:Controlled with dilaudid PCA, scheduled Toradol and tylenol  Resp:LS clear  GI:WDL  :Passing gas and small amount serosanguinous fluid through ostomy  Skin:Incision dressings intact.   Treatment:IV antibiotics, pain control  Consults:Colorectal  Disposition:TBD

## 2019-09-22 NOTE — PLAN OF CARE
POD #3. VSS. Afebrile. LS clear. BS x4, passing gas via ostomy, starting to have liquid output. Pt having minimal pain, using PCA dilaudid and scheduled Tylenol. Clear liquid diet, tolerating without issues. SBA to ambulate. Voiding without issues. PIV - D5 1/2 NS @ 100 ml/hr. Getting IV Merrem. Incision with steri-strips in place, dried drainage noted. Slept well. Discharge TBD.

## 2019-09-22 NOTE — PROGRESS NOTES
[facility]   General Surgery Progress Note          Assessment and Plan:   Assessment:   POD#3 s/p laparoscopic-assisted sigmoid colectomy with colostomy  Post-operative ileus as expected - resolving      Plan:   -Diet: full liquids  -IV ABX: Meropenem and diflucan  -VTE PPX: Lovenox, PCDs  -Pain mgmt: Dilaudid PCA, tylenol. Can transition to oral meds when tolerating full liquids.  -Increase activity as tolerated, up in chair and walk   -Await further return of bowel function         Interval History:   Resting in bed, continues to improve. His primary complaint is some transient right groin pain. He states he has a hernia. The pain is gone and he is fine now. He is tolerating clears, voiding, up walking the halls and passing flatus into the bag.         Physical Exam:   Blood pressure 125/83, pulse 118, temperature 97.5  F (36.4  C), temperature source Oral, resp. rate 16, weight 63.5 kg (140 lb), SpO2 95 %.    I/O last 3 completed shifts:  In: 2676 [P.O.:200; I.V.:2476]  Out: 525 [Urine:500; Stool:25]    Abdomen: soft, ND, +mild central tenderness (incisinoal), +BS  Inc(s) - c/d/i, +echymosis  Colostomy - stoma pink/viable, serosang fluid in bag, +flatus           Data:     Recent Labs   Lab 09/22/19  0721 09/21/19  0702 09/20/19  0653 09/19/19  0735   WBC  --  8.5 9.3 6.4   HGB  --  8.0* 10.8* 11.8*   HCT  --  25.0* 33.8* 37.6*   MCV  --  92 90 92    [phone]            Atul Merchant PA-C    Seen and agree,    Kody Meyers MD  [facility]

## 2019-09-23 ENCOUNTER — TELEPHONE (OUTPATIENT)
Dept: GENERAL RADIOLOGY | Facility: CLINIC | Age: 54
End: 2019-09-23

## 2019-09-23 LAB
ANION GAP SERPL CALCULATED.3IONS-SCNC: 4 MMOL/L (ref 3–14)
BUN SERPL-MCNC: 7 MG/DL (ref 7–30)
CALCIUM SERPL-MCNC: 9.2 MG/DL (ref 8.5–10.1)
CHLORIDE SERPL-SCNC: 105 MMOL/L (ref 94–109)
CO2 SERPL-SCNC: 29 MMOL/L (ref 20–32)
COPATH REPORT: NORMAL
CREAT SERPL-MCNC: 0.79 MG/DL (ref 0.66–1.25)
GFR SERPL CREATININE-BSD FRML MDRD: >90 ML/MIN/{1.73_M2}
GLUCOSE SERPL-MCNC: 100 MG/DL (ref 70–99)
POTASSIUM SERPL-SCNC: 4 MMOL/L (ref 3.4–5.3)
SODIUM SERPL-SCNC: 138 MMOL/L (ref 133–144)

## 2019-09-23 PROCEDURE — 12000000 ZZH R&B MED SURG/OB

## 2019-09-23 PROCEDURE — 36415 COLL VENOUS BLD VENIPUNCTURE: CPT | Performed by: SURGERY

## 2019-09-23 PROCEDURE — 25000132 ZZH RX MED GY IP 250 OP 250 PS 637: Performed by: SURGERY

## 2019-09-23 PROCEDURE — 25000128 H RX IP 250 OP 636: Performed by: SURGERY

## 2019-09-23 PROCEDURE — 80048 BASIC METABOLIC PNL TOTAL CA: CPT | Performed by: SURGERY

## 2019-09-23 PROCEDURE — G0463 HOSPITAL OUTPT CLINIC VISIT: HCPCS

## 2019-09-23 RX ORDER — OXYCODONE HYDROCHLORIDE 5 MG/1
5-10 TABLET ORAL EVERY 4 HOURS PRN
Status: DISCONTINUED | OUTPATIENT
Start: 2019-09-23 | End: 2019-09-25 | Stop reason: HOSPADM

## 2019-09-23 RX ORDER — METHOCARBAMOL 100 MG/ML
1000 INJECTION, SOLUTION INTRAMUSCULAR; INTRAVENOUS EVERY 8 HOURS
Status: DISCONTINUED | OUTPATIENT
Start: 2019-09-23 | End: 2019-09-23

## 2019-09-23 RX ORDER — AMOXICILLIN 250 MG
1 CAPSULE ORAL 2 TIMES DAILY
Status: DISCONTINUED | OUTPATIENT
Start: 2019-09-23 | End: 2019-09-25 | Stop reason: HOSPADM

## 2019-09-23 RX ORDER — PANTOPRAZOLE SODIUM 20 MG/1
20 TABLET, DELAYED RELEASE ORAL
Status: DISCONTINUED | OUTPATIENT
Start: 2019-09-23 | End: 2019-09-25 | Stop reason: HOSPADM

## 2019-09-23 RX ORDER — METHOCARBAMOL 500 MG/1
1000 TABLET, FILM COATED ORAL EVERY 8 HOURS
Status: COMPLETED | OUTPATIENT
Start: 2019-09-23 | End: 2019-09-24

## 2019-09-23 RX ORDER — KETOROLAC TROMETHAMINE 15 MG/ML
15 INJECTION, SOLUTION INTRAMUSCULAR; INTRAVENOUS EVERY 6 HOURS
Status: COMPLETED | OUTPATIENT
Start: 2019-09-23 | End: 2019-09-24

## 2019-09-23 RX ORDER — HYDROMORPHONE HYDROCHLORIDE 1 MG/ML
.3-.5 INJECTION, SOLUTION INTRAMUSCULAR; INTRAVENOUS; SUBCUTANEOUS
Status: DISCONTINUED | OUTPATIENT
Start: 2019-09-23 | End: 2019-09-23

## 2019-09-23 RX ORDER — HYDROMORPHONE HYDROCHLORIDE 1 MG/ML
0.3 INJECTION, SOLUTION INTRAMUSCULAR; INTRAVENOUS; SUBCUTANEOUS
Status: DISCONTINUED | OUTPATIENT
Start: 2019-09-23 | End: 2019-09-25 | Stop reason: HOSPADM

## 2019-09-23 RX ADMIN — ENOXAPARIN SODIUM 40 MG: 40 INJECTION SUBCUTANEOUS at 11:55

## 2019-09-23 RX ADMIN — MEROPENEM 1 G: 1 INJECTION, POWDER, FOR SOLUTION INTRAVENOUS at 11:06

## 2019-09-23 RX ADMIN — MEROPENEM 1 G: 1 INJECTION, POWDER, FOR SOLUTION INTRAVENOUS at 19:19

## 2019-09-23 RX ADMIN — PANTOPRAZOLE SODIUM 20 MG: 20 TABLET, DELAYED RELEASE ORAL at 08:12

## 2019-09-23 RX ADMIN — HYDROMORPHONE HYDROCHLORIDE 0.3 MG: 1 INJECTION, SOLUTION INTRAMUSCULAR; INTRAVENOUS; SUBCUTANEOUS at 11:51

## 2019-09-23 RX ADMIN — KETOROLAC TROMETHAMINE 15 MG: 15 INJECTION, SOLUTION INTRAMUSCULAR; INTRAVENOUS at 21:32

## 2019-09-23 RX ADMIN — KETOROLAC TROMETHAMINE 15 MG: 15 INJECTION, SOLUTION INTRAMUSCULAR; INTRAVENOUS at 14:56

## 2019-09-23 RX ADMIN — METHOCARBAMOL 1000 MG: 500 TABLET ORAL at 17:20

## 2019-09-23 RX ADMIN — METHOCARBAMOL 1000 MG: 500 TABLET ORAL at 09:34

## 2019-09-23 RX ADMIN — OXYCODONE HYDROCHLORIDE 10 MG: 5 TABLET ORAL at 13:39

## 2019-09-23 RX ADMIN — OXYCODONE HYDROCHLORIDE 10 MG: 5 TABLET ORAL at 20:11

## 2019-09-23 RX ADMIN — KETOROLAC TROMETHAMINE 15 MG: 15 INJECTION, SOLUTION INTRAMUSCULAR; INTRAVENOUS at 02:56

## 2019-09-23 RX ADMIN — KETOROLAC TROMETHAMINE 15 MG: 15 INJECTION, SOLUTION INTRAMUSCULAR; INTRAVENOUS at 09:01

## 2019-09-23 RX ADMIN — HYDROMORPHONE HYDROCHLORIDE 0.3 MG: 1 INJECTION, SOLUTION INTRAMUSCULAR; INTRAVENOUS; SUBCUTANEOUS at 16:12

## 2019-09-23 RX ADMIN — OXYCODONE HYDROCHLORIDE 5 MG: 5 TABLET ORAL at 07:51

## 2019-09-23 RX ADMIN — OXYCODONE HYDROCHLORIDE 5 MG: 5 TABLET ORAL at 09:08

## 2019-09-23 RX ADMIN — MEROPENEM 1 G: 1 INJECTION, POWDER, FOR SOLUTION INTRAVENOUS at 02:56

## 2019-09-23 RX ADMIN — FLUCONAZOLE 400 MG: 400 INJECTION, SOLUTION INTRAVENOUS at 11:50

## 2019-09-23 RX ADMIN — HYDROMORPHONE HYDROCHLORIDE 0.3 MG: 1 INJECTION, SOLUTION INTRAMUSCULAR; INTRAVENOUS; SUBCUTANEOUS at 23:33

## 2019-09-23 ASSESSMENT — ACTIVITIES OF DAILY LIVING (ADL)
ADLS_ACUITY_SCORE: 13

## 2019-09-23 NOTE — PLAN OF CARE
Pt up SBA. A&OX4. Ambulated in boo x1. VSS. PCA for pain control, total of 3.1 mg dilaudid given. +BS, passing gas. Minimal serosanguineous drainage out of ostomy. Tolerating full liquids. Educated pt on use of oral oxycodone for pain control vs. PCA, pt hesitant to take oral d/t pain. Surgery & WOC following.

## 2019-09-23 NOTE — PROGRESS NOTES
CLINICAL NUTRITION SERVICES - REASSESSMENT NOTE      MALNUTRITION:  (9/23/2019)  % Weight Loss: Unable to determine --> ?accuracy of wt trends, patient reports 30# wt loss w/in period of months PTA  % Intake:  </= 50% for >/= 1 month (severe malnutrition) --.> continues during admit  Subcutaneous Fat Loss:  Orbital region mild depletion, Upper arm region mild depletion  Muscle Loss:  Temporal region mild to moderate depletion, Clavicle bone region mild depletion, Acromion bone region moderate depletion, Dorsal hand region mild to moderate depletion, Patellar region mild depletion, Anterior thigh region mild depletion and Posterior calf region mild depletion  Fluid Retention:  None noted     Malnutrition Diagnosis: Severe malnutrition  In Context of:  Acute illness or injury on chronic disease       EVALUATION OF PROGRESS TOWARD GOALS   Diet: Low fiber + Boost shakes between meals BID      Intake/Tolerance: Diet advanced to clears 9/21, fulls 9/22, and low-fiber since this AM.  Slowly increasing amount eating each day, denies nausea or increase in pain today, just feeling tired.  Suspect continues to meet <50% needs, even with supplement use.  Has tried his supplement, drank 1 in total yesterday.  He is ok with continuing these, would also like to continue Boost or Ensure at home.  See low-fiber ed below.      ASSESSED NUTRITION NEEDS PER APPROVED PRACTICE GUIDELINES:     Dosing Weight 76.7 kg (based on previous weights per trends)   Estimated Energy Needs: 4243-8911 kcals (25-30 Kcal/Kg)  Justification: maintenance  Estimated Protein Needs: 77-92+ grams protein (1-1.2+ g pro/Kg)  Justification: maintenance and preservation of lean body mass  Estimated Fluid Needs: >1 mL/Kcal  Justification: maintenance      NEW FINDINGS:   - Labs and meds reviewed.  - Wt trending reviewed, unclear accuracy of wt from 9/20 (140#).  No wt since this time.    - Minimal amounts of colostomy output yesterday, increase today.  Return of  "bowel function.      Previous Goals:   Pt to advance to >NPO/clears within the next 24-48 hours  Evaluation: Met    Previous Nutrition Diagnosis:   Inadequate oral intake related to increased nausea/vomiting, pain and poor appetite as evidenced by pt likely consuming <50% of nutritional needs prior to admission for the last month and noted muscle/fat loss.  Evaluation: No change, continued below      CURRENT NUTRITION DIAGNOSIS  Inadequate oral intake related to altered GI function post-op as evidenced by meeting <50% needs since admission, muscle/fat loss, concern for wt loss PTA with questionable accuracy, coding of malnutrition.    INTERVENTIONS  Recommendations / Nutrition Prescription  Continue diet per Surgery team.  Continue supplements as ordered.    Reweigh as able.    Implementation  Collaboration and Referral of Nutrition care: Discussed POC with team during rounds.  Discussed POC with RN.      Nutrition Education: Provided low fiber diet ed:    Assessed learning needs, learning preferences, and willingness to learn    Nutrition Education (Content):  a) Provided handout \"low fiber nutrition therapy\"  b) Discussed common foods high in fiber to avoid, appropriate substitutes    Nutrition Education (Application):  a) Discussed eating habits and recommended alternative food choices    Patient verbalizes understanding of diet by stating desire to avoid salads short-term.     Anticipate good compliance    Diet Education - refer to Education Flowsheet      Goals  Patient to consume at least 50-75% of meals TID or the equivalent with supplement use to show improvements in PO intakes.       MONITORING AND EVALUATION:  Progress towards goals will be monitored and evaluated per protocol and Practice Guidelines      Abiola Yuen RD, LD  Clinical Dietitian  3rd floor/ICU: [phone]  All other floors: [phone]  Weekend/holiday: [phone]  "

## 2019-09-23 NOTE — PROGRESS NOTES
[facility]   General Surgery Progress Note          Assessment and Plan:   Assessment:   POD#4 s/p laparoscopic-assisted sigmoid colectomy with end colostomy  Complicated diverticulitis with abscess  Post-operative ileus as expected - resolving  Mild protein-calorie malnutrition      Plan:   -Diet: transition to low fiber. Continue boost shakes, discontinue IVF  -IV ABX: Meropenem and diflucan 1 more day  -VTE PPX: Lovenox, PCDs  -Pain mgmt: Discontinue Dilaudid PCA-  Scheduled tylenol, reordered toradol and also robaxin for 1 more day to help wean narcotic. Prn oxycodone, dilaudid and valium.   -recheck BMP with ongoing toradol use  -Increase activity as tolerated, walking  -C nurse following for ostomy teaching  -Await further return of bowel function  -1-2 more days inpatient  -Mild protein-calorie malnutrition: RD following         Interval History:   He is tolerating full liquids, voiding, up walking the halls and passing flatus into the bag. No stool in bag yet. Feels a lot of cramping gas pains this morning. Was able to sleep some last night but up every hour to urinate.         Physical Exam:   Blood pressure 131/75, pulse 118, temperature 97.8  F (36.6  C), temperature source Oral, resp. rate 18, weight 63.5 kg (140 lb), SpO2 96 %.    I/O last 3 completed shifts:  In: 2666 [P.O.:200; I.V.:2466]  Out: 0     Abdomen: soft, mild distended, +mild central tenderness (incisinoal)  Inc(s) - c/d/i, +echymosis  Colostomy - stoma pink/viable, serosang fluid in bag, +flatus           Data:     Recent Labs   Lab 09/22/19  0721 09/21/19  0702 09/20/19  0653 09/19/19  0735   WBC  --  8.5 9.3 6.4   HGB  --  8.0* 10.8* 11.8*   HCT  --  25.0* 33.8* 37.6*   MCV  --  92 90 92    316 429 347            Jaimee Barrios MD

## 2019-09-23 NOTE — PLAN OF CARE
Pt is alert and oriented, up with assist of 1. Pt using the PCA of dilaudid appropriately. Pt continues to have gas pains, encouraged up and walking and did discuss backing off on the narcotics, pt declined both overnight. Pt on IV merrem and diflucan for antibiotics. Pt with gas present to colostomy, no stool overnight.

## 2019-09-23 NOTE — PROGRESS NOTES
"[facility] Nurse Inpatient Ostomy Assessment      Assessment of ostomy and needs for:  End  Colostomy      Data:   History:      Per MD note(s):  54 year old male with a PMH of diverticulitis, admitted to the hospital with worsening complicated diverticulitis and need for urgent sigmoid colectomy.  At this point he remains hemodynamically stable without peritonitis.   DX: complicated sigmoid diverticulitis with microperforation and abscess.   Procedure:   1.Laparoscopic-assisted  sigmoid colectomy with end descending colostomy.    2.  Complete  takedown  of  splenic  flexure   3.  Ureteral  stent  placement  by  Urology      Type of ostomy:  Colostomy  Stoma assessment:   ? Size of stoma:   1 1/2\" round pink red and moist with good protrusion, slight edema, and beginning to function  Mucocutaneous Junction (MCJ):  Intact with sutures  Peristomal skin: intact, no erosion, stab surgical site under wafer at around 3 o'clock covered with steri strips, crusted with powder  Abdominal assessment: midline incision intact  Output:  scant , effluent    Intake/Output Summary (Last 24 hours) at 9/23/2019 1616  Last data filed at 9/23/2019 1348  Gross per 24 hour   Intake 2379 ml   Output 510 ml   Net 1869 ml       Current pouching system:  Shelia,  one piece, flat and placed in OR,   Pain: tender with gas pains  ? Is pt still on a PCA? discharge today    Diet:       Orders Placed This Encounter      Low Fiber Diet    Labs:   Recent Labs   Lab Test 09/21/19  0702  09/04/19  1913  08/19/19  0756   ALBUMIN  --   --   --   --  4.2   HGB 8.0*   < > 12.8*   < > 15.6   INR  --   --  1.06  --   --    WBC 8.5   < > 11.0   < > 11.4*    < > = values in this interval not displayed.           Intervention:     Patient's chart evaluated.      Assessments done today:  Stoma and 1st pouch change    Education: continued demo and hands on 1st pouch change, discussed products, skin care, activities at home with pt and " spouse;     Prepared for discharge by: Supplies ordered, Prescriptions or note left on chart for MD to sign/complete, Discussed when to follow up with a WOC Nurse in the future, Discussed how/ where to order supplies and ansewered all questions    Pt registered for ostomy supply samples? Yes: on discharge         Assessment:     Stoma assessment: viable, pink-red, round, moist and non functioning 1 day post op    Learning needs/ comprehension: no prior experience, pt was anxious today and dealing with emotions, spouse was assisting and observing pouch change and both were asking appropriate questions,  pt works with quartz and granite counter tops, children are in [place] visiting family this summer and spouse will need to return to [place] to retrieve them; Ages 1 and 3.    Effectiveness of current pouching/ supply plan: TBD intact 3 day         Plan:     Plan:   ? Current pouching system: Coloplast 1 pc with ring    Education:  ? Education continued thru hospital stay:  Pouch Emptying and Pouch Replacement, How to empty pouch, Removal of pouch, Preparation of new pouch, Cutting out or evaluating a pattern, Applying paste or rings, Applying appliance to abdomen, Peristomal skin care, Diet and hydration / fluid balance, Odor / flatus management and Lifestyle Adjustments   o Supply company: pt will call insurance company  o Do WOC Nurse recommend home care ? No, per pt and spouse

## 2019-09-23 NOTE — PLAN OF CARE
POD 4. VSS. PCA pump discontinued. Second nurse verified waste. Did not clear pump before disposing of syringe, not able to clear pump. Oxycodone x2. Large amount of stool from ostomy. Education on emptying ostomy provided. IV saline locked. Advanced diet to low fiber. Meropenem and diflucan. SBA for mobility, ambulating in boo. Nutrition consult. Wife present.

## 2019-09-24 LAB
ANION GAP SERPL CALCULATED.3IONS-SCNC: 4 MMOL/L (ref 3–14)
BUN SERPL-MCNC: 9 MG/DL (ref 7–30)
CALCIUM SERPL-MCNC: 9.6 MG/DL (ref 8.5–10.1)
CHLORIDE SERPL-SCNC: 106 MMOL/L (ref 94–109)
CO2 SERPL-SCNC: 29 MMOL/L (ref 20–32)
CREAT SERPL-MCNC: 0.91 MG/DL (ref 0.66–1.25)
GFR SERPL CREATININE-BSD FRML MDRD: >90 ML/MIN/{1.73_M2}
GLUCOSE SERPL-MCNC: 97 MG/DL (ref 70–99)
POTASSIUM SERPL-SCNC: 4.2 MMOL/L (ref 3.4–5.3)
SODIUM SERPL-SCNC: 139 MMOL/L (ref 133–144)

## 2019-09-24 PROCEDURE — 25000128 H RX IP 250 OP 636: Performed by: SURGERY

## 2019-09-24 PROCEDURE — 40000903 ZZH STATISTIC WOC PT EDUCATION, 31-45 MIN

## 2019-09-24 PROCEDURE — 25000132 ZZH RX MED GY IP 250 OP 250 PS 637: Performed by: SURGERY

## 2019-09-24 PROCEDURE — 12000000 ZZH R&B MED SURG/OB

## 2019-09-24 PROCEDURE — 36415 COLL VENOUS BLD VENIPUNCTURE: CPT | Performed by: SURGERY

## 2019-09-24 PROCEDURE — 80048 BASIC METABOLIC PNL TOTAL CA: CPT | Performed by: SURGERY

## 2019-09-24 PROCEDURE — 25000132 ZZH RX MED GY IP 250 OP 250 PS 637: Performed by: PHYSICIAN ASSISTANT

## 2019-09-24 RX ORDER — SACCHAROMYCES BOULARDII 250 MG
250 CAPSULE ORAL 2 TIMES DAILY
Status: DISCONTINUED | OUTPATIENT
Start: 2019-09-24 | End: 2019-09-25 | Stop reason: HOSPADM

## 2019-09-24 RX ORDER — ACETAMINOPHEN 325 MG/1
650 TABLET ORAL EVERY 6 HOURS PRN
Status: DISCONTINUED | OUTPATIENT
Start: 2019-09-24 | End: 2019-09-25 | Stop reason: HOSPADM

## 2019-09-24 RX ORDER — IBUPROFEN 600 MG/1
600 TABLET, FILM COATED ORAL EVERY 6 HOURS PRN
Status: DISCONTINUED | OUTPATIENT
Start: 2019-09-24 | End: 2019-09-25 | Stop reason: HOSPADM

## 2019-09-24 RX ADMIN — OXYCODONE HYDROCHLORIDE 10 MG: 5 TABLET ORAL at 09:03

## 2019-09-24 RX ADMIN — MEROPENEM 1 G: 1 INJECTION, POWDER, FOR SOLUTION INTRAVENOUS at 19:16

## 2019-09-24 RX ADMIN — METHOCARBAMOL 1000 MG: 500 TABLET ORAL at 01:19

## 2019-09-24 RX ADMIN — SENNOSIDES AND DOCUSATE SODIUM 1 TABLET: 8.6; 5 TABLET ORAL at 08:37

## 2019-09-24 RX ADMIN — OXYCODONE HYDROCHLORIDE 10 MG: 5 TABLET ORAL at 16:43

## 2019-09-24 RX ADMIN — PANTOPRAZOLE SODIUM 20 MG: 20 TABLET, DELAYED RELEASE ORAL at 06:07

## 2019-09-24 RX ADMIN — OXYCODONE HYDROCHLORIDE 10 MG: 5 TABLET ORAL at 13:09

## 2019-09-24 RX ADMIN — ENOXAPARIN SODIUM 40 MG: 40 INJECTION SUBCUTANEOUS at 11:20

## 2019-09-24 RX ADMIN — MEROPENEM 1 G: 1 INJECTION, POWDER, FOR SOLUTION INTRAVENOUS at 11:17

## 2019-09-24 RX ADMIN — Medication 250 MG: at 10:19

## 2019-09-24 RX ADMIN — OXYCODONE HYDROCHLORIDE 10 MG: 5 TABLET ORAL at 01:19

## 2019-09-24 RX ADMIN — OXYCODONE HYDROCHLORIDE 10 MG: 5 TABLET ORAL at 20:52

## 2019-09-24 RX ADMIN — FLUCONAZOLE 400 MG: 400 INJECTION, SOLUTION INTRAVENOUS at 11:55

## 2019-09-24 RX ADMIN — Medication 250 MG: at 20:53

## 2019-09-24 RX ADMIN — KETOROLAC TROMETHAMINE 15 MG: 15 INJECTION, SOLUTION INTRAMUSCULAR; INTRAVENOUS at 03:18

## 2019-09-24 RX ADMIN — MEROPENEM 1 G: 1 INJECTION, POWDER, FOR SOLUTION INTRAVENOUS at 03:18

## 2019-09-24 ASSESSMENT — ACTIVITIES OF DAILY LIVING (ADL)
ADLS_ACUITY_SCORE: 13

## 2019-09-24 NOTE — PLAN OF CARE
Patient remains hospitalized following laparoscopic colectomy with colostomy, currently POD #4.  A&O x4.  VSS, IV - SL.  Ambulatory Status: Pt up with SBA. Alarms OFF. Ambulated in room.  Pain: Controlled with schedule Toradol and PRN oxycodone and IV diluadid.  Resp: LS clear, on RA.  GI: Denies nausea.  BS normoactive. Passing gas. LBM 9/23. On low fiber diet.  : Voiding spontaneously.  Skin: Incisions CDI.  Tx: Diflucan and Merrem.  Consults/Following: WOC, nutrition, & surgery.  Disposition: Discharge to home in 1-2 days per MD note.

## 2019-09-24 NOTE — PROGRESS NOTES
[facility]   General Surgery Progress Note          Assessment and Plan:   Assessment:   POD#5 s/p laparoscopic-assisted sigmoid colectomy with end colostomy  Complicated diverticulitis with abscess  Post-operative ileus as expected - resolved  Mild protein-calorie malnutrition      Plan:   -Diet: low fiber, continue boost shakes, added probiotics  -Mild protein-calorie malnutrition: RD following  -IV ABX: last day of Meropenem and diflucan   -VTE PPX: Lovenox, PCDs  -Pain mgmt: Scheduled tylenol, finished toradol and also robaxin. Prn oxycodone, ibupofen and valium.   -Increase activity as tolerated, walking  -WOC nurse following for ostomy teaching, will do additional teaching wednesday  -Disposition: will plan to discharge tomorrow after WOC RN teaching         Interval History:   Sitting up in bed, doing well. States he feels much better today. He had a large BM and all the pain and pressure went away. He feels more normal now, and he is up walking, voiding and tolerating a low-res diet. He feels that he needs more stoma teaching before discharge.         Physical Exam:   Blood pressure 128/85, pulse 72, temperature 97.7  F (36.5  C), temperature source Oral, resp. rate 18, weight 67.9 kg (149 lb 11.2 oz), SpO2 95 %.    I/O last 3 completed shifts:  In: 679 [P.O.:570; I.V.:109]  Out: 510 [Stool:510]    Abdomen: soft, mild distended, +mild central tenderness (incisinoal)  Inc(s) - c/d/i, +echymosis  Colostomy - stoma pink/viable, serosang fluid in bag          Data:     Recent Labs   Lab 09/22/19  0721 09/21/19  0702 09/20/19  0653 09/19/19  0735   WBC  --  8.5 9.3 6.4   HGB  --  8.0* 10.8* 11.8*   HCT  --  25.0* 33.8* 37.6*   MCV  --  92 90 92    316 429 347            Max Tomas Merchant PA-C    Seen and agree with above  Looks excellent. Having stool in ostomy. Pain vastly improved.  Stop antibiotics after today  Home tomorrow after final ostomy teaching complete  Jaimee Barrios MD

## 2019-09-24 NOTE — PLAN OF CARE
POD #5. VSS. Afebrile. LS clear. BS x4, passing gas via ostomy. Pt continues to have pain, encouraging PO pain meds; did received IV dilaudid x1. Denies nausea. Low Fiber diet, tolerating without issues. PIV - SL. Getting Diflucan and Merrem. Incisions with steri-strips in place. Discharge pending 1-2 days.

## 2019-09-24 NOTE — PLAN OF CARE
Alert and oriented. VSS. Oxy x2 for pain. SBA for mobility, ambulating in boo. Patient emptied ostomy bag air with supervision from writer. Patient has substance on penis that patient is peeling off, denies itchiness, vaseline applied. Per PA, most likely betadine from swan insertion, encouraged patient to cleanse area. Patient states he was using purple top wipes to clean hands. Instructed patient that these are not for hands, gave patient hand  and towels.

## 2019-09-24 NOTE — PROGRESS NOTES
"[facility] Nurse Inpatient Follow up-Ostomy Assessment and Management         Data:     History:   Per MD note(s):   Per MD note(s):  54 year old male with a PMH of diverticulitis, admitted to the hospital with worsening complicated diverticulitis and need for urgent sigmoid colectomy.    Date of surgery: 9/19/2019  Surgeon: Jaimee Barrios MD  PREOPERATIVE  DIAGNOSIS: Complicated sigmoid diverticulitis with microperforation and abscess  PROCEDURE:    1.  Laparoscopic-assisted  sigmoid colectomy with end descending colostomy.    2.  Complete  takedown  of  splenic  flexure   3.  Ureteral  stent  placement  by  Urology    Recent Labs   Lab Test 09/21/19  0702  09/04/19  1913  08/19/19  0756   ALBUMIN  --   --   --   --  4.2   HGB 8.0*   < > 12.8*   < > 15.6   INR  --   --  1.06  --   --    WBC 8.5   < > 11.0   < > 11.4*    < > = values in this interval not displayed.          Assessment:     Pouch changed yesterday      Stoma assessment (Viewed through pouch):     Size of stoma:  Approx  1 1/2\" round pink red and moist with good protruding, slight edema, function stool and flatus    I/O last 3 completed shifts:    In: 679 [P.O.:570; I.V.:109]    Out: 510 [Stool:510]    Mucocutaneous Junction (MCJ): [facility] documented 9/23/2019 Intact with sutures    Peristomal skin: [facility] documented 9/23/2019 intact    Diet low fiber-patient is tolerating well    Effectiveness of current pouching system: Shelia 1 piece cut to fit with barrier  ring intact since pouch change yesterday, was intact for 3 days before that.     Learning needs/ comprehension: patient  flatus emptied many time by patient, was able to recall appropriate pouch empty and change frequency, skin care, diet considerations         Intervention:     Patient's chart evaluated.      Assessments done today:  Stoma and 1st pouch change    Education: Discussed considerations for travel,  Intimacy, bathing, swimming          Plan:     Plan:     Current " pouching system: Coloplast cut to fit 1 pc with barrier ring.     Patient states he plans to eventually transition to a 2 piece closed pouching system as discussed last visit with [facility] Nurse    Anticipate discharge tomorrow-patient is hoping for another pouch change return demonstration prior to discharge     Would like a copy of Ostomy travel cared and options for ordering a belt for intimacy and privacy at home.    Brandi Adame MS RN CWOCN

## 2019-09-25 VITALS
BODY MASS INDEX: 23.45 KG/M2 | WEIGHT: 149.7 LBS | SYSTOLIC BLOOD PRESSURE: 131 MMHG | OXYGEN SATURATION: 95 % | HEART RATE: 74 BPM | TEMPERATURE: 99 F | RESPIRATION RATE: 16 BRPM | DIASTOLIC BLOOD PRESSURE: 86 MMHG

## 2019-09-25 LAB — PLATELET # BLD AUTO: 411 10E9/L (ref 150–450)

## 2019-09-25 PROCEDURE — 85049 AUTOMATED PLATELET COUNT: CPT | Performed by: SURGERY

## 2019-09-25 PROCEDURE — 25000132 ZZH RX MED GY IP 250 OP 250 PS 637: Performed by: PHYSICIAN ASSISTANT

## 2019-09-25 PROCEDURE — 36415 COLL VENOUS BLD VENIPUNCTURE: CPT | Performed by: SURGERY

## 2019-09-25 PROCEDURE — G0463 HOSPITAL OUTPT CLINIC VISIT: HCPCS

## 2019-09-25 PROCEDURE — 25000132 ZZH RX MED GY IP 250 OP 250 PS 637: Performed by: SURGERY

## 2019-09-25 RX ORDER — OXYCODONE HYDROCHLORIDE 5 MG/1
5-10 TABLET ORAL EVERY 6 HOURS PRN
Qty: 30 TABLET | Refills: 0 | Status: SHIPPED | OUTPATIENT
Start: 2019-09-25 | End: 2019-11-18

## 2019-09-25 RX ORDER — CALCIUM CARBONATE 500 MG/1
1000 TABLET, CHEWABLE ORAL EVERY 4 HOURS PRN
Status: DISCONTINUED | OUTPATIENT
Start: 2019-09-25 | End: 2019-09-25 | Stop reason: HOSPADM

## 2019-09-25 RX ORDER — SACCHAROMYCES BOULARDII 250 MG
250 CAPSULE ORAL 2 TIMES DAILY
Qty: 28 CAPSULE | Refills: 0 | Status: SHIPPED | OUTPATIENT
Start: 2019-09-25 | End: 2019-11-06

## 2019-09-25 RX ADMIN — PANTOPRAZOLE SODIUM 20 MG: 20 TABLET, DELAYED RELEASE ORAL at 06:03

## 2019-09-25 RX ADMIN — Medication 250 MG: at 08:47

## 2019-09-25 RX ADMIN — OXYCODONE HYDROCHLORIDE 10 MG: 5 TABLET ORAL at 12:50

## 2019-09-25 RX ADMIN — CALCIUM CARBONATE (ANTACID) CHEW TAB 500 MG 1000 MG: 500 CHEW TAB at 06:20

## 2019-09-25 RX ADMIN — OXYCODONE HYDROCHLORIDE 10 MG: 5 TABLET ORAL at 00:52

## 2019-09-25 RX ADMIN — SENNOSIDES AND DOCUSATE SODIUM 1 TABLET: 8.6; 5 TABLET ORAL at 08:47

## 2019-09-25 RX ADMIN — OXYCODONE HYDROCHLORIDE 10 MG: 5 TABLET ORAL at 08:47

## 2019-09-25 ASSESSMENT — ACTIVITIES OF DAILY LIVING (ADL)
ADLS_ACUITY_SCORE: 13

## 2019-09-25 NOTE — DISCHARGE INSTRUCTIONS
HOME CARE FOLLOWING ABDOMINAL SURGERY  PARRIS Guzman, MAJOR Collins R. O Donnell, J. Shaheen    INCISIONAL CARE:  Replace the bandage over your incision (or incisions) until all drainage stops, or if more comfortable to have in place.  If present, leave the steri-strips (white paper tapes) in place for 14 days after surgery.  If you have staples in your incision at the time of discharge, they will be removed at your follow-up appointment.  If Dermabond (a type of skin glue) is present, leave in place until it wears/flakes off.     BATHING:  Avoid baths for 1 week after surgery.  Showers are okay.  You may wash your hair at any time.  Gently pat your incision dry after bathing.    ACTIVITY:  Light Activity -- you may immediately be up and about as tolerated.  Driving -- you may drive when comfortable and off narcotic pain medications.  Light Work -- resume when comfortable off pain medications.  (If you can drive, you probably can work.)  Strenuous Work/Activity -- limit lifting to 20 pounds for 4 weeks.  Then, progressively increase with time.  Active Sports (running, biking, etc.) -- cautiously resume after 6 weeks.    DISCOMFORT:  Use pain medications as prescribed by your surgeon.  Take the pain medication with some food, when possible, to minimize side effects.  Expect gradual improvement.    DIET:  Return to diet you were on before surgery, unless you are given specific diet instructions.  Drink plenty of fluids.  While taking pain medications, increase dietary fiber or add a fiber supplementation like Metamucil or Citrucel to help prevent constipation - a possible side effect of pain medications.    NAUSEA:  If nauseated from the anesthetic/pain meds; rest in bed, get up cautiously with assistance, and drink clear liquids (juice, tea, broth).    RETURN APPOINTMENT:  Schedule a follow-up visit 2-3 weeks after discharge from the hospital.  Office Phone:  [phone]     CONTACT US IF THE  FOLLOWING DEVELOPS:   1. A fever that is above 101     2. If there is a large amount of drainage, bleeding, or swelling.   3. Severe pain that is not relieved by your prescription.   4. Drainage that is thick, cloudy, yellow, green or white.   5. Any other questions not answered by  Frequently Asked Questions  sheet.      FREQUENTLY ASKED QUESTIONS:    Q:  How should my incision look?    A:  Normally your incision will appear slightly swollen with light redness directly along the incision itself as it heals.  It may feel like a bump or ridge as the healing/scarring happens, and over time (3-4 months) this bump or ridge feeling should slowly go away.  In general, clear or pink watery drainage can be normal at first as your incision heals, but should decrease over time.    Q:  How do I know if my incision is infected?  A:  Look at your incision for signs of infection, like redness around the incision spreading to surrounding skin, or drainage of cloudy or foul-smelling drainage.  If you feel warm, check your temperature to see if you are running a fever.    **If any of these things occur, please notify the nurse at our office.  We may need you to come into the office for an incision check.      Q:  How do I take care of my incision?  A:  If you have a dressing in place - Starting the day after surgery, replace the dressing 1-2 times a day until there is no further drainage from the incision.  At that time, a dressing is no longer needed.  Try to minimize tape on the skin if irritation is occurring at the tape sites.  If you have significant irritation from tape on the skin, please call the office to discuss other method of dressing your incision.    Small pieces of tape called  steri-strips  may be present directly overlying your incision; these may be removed 10 days after surgery unless otherwise specified by your surgeon.  If these tapes start to loosen at the ends, you may trim them back until they fall off or are  removed.    A:  If you had  Dermabond  tissue glue used as a dressing (this causes your incision to look shiny with a clear covering over it) - This type of dressing wears off with time and does not require more dressings over the top unless it is draining around the glue as it wears off.  Do not apply ointments or lotions over the incisions until the glue has completely worn off.    Q:  There is a piece of tape or a sticky  lead  still on my skin.  Can I remove this?  A:  Sometimes the sticky  leads  used for monitoring during surgery or for evaluation in the emergency department are not all removed while you are in the hospital.  These sometimes have a tab or metal dot on them.  You can easily remove these on your own, like taking off a band-aid.  If there is a gel substance under the  lead , simply wipe/clean it off with a washcloth or paper towel.      Q:  What can I do to minimize constipation (very hard stools, or lack of stools)?  A:  Stay well hydrated.  Increase your dietary fiber intake or take a fiber supplement -with plenty of water.  Walk around frequently.  You may consider an over-the-counter stool-softener.  Your Pharmacist can assist you with choosing one that is stocked at your pharmacy.  Constipation is also one of the most common side effects of pain medication.  If you are using pain medication, be pro-active and try to PREVENT problems with constipation by taking the steps above BEFORE constipation becomes a problem.    Q:  What do I do if I need more pain medications?  A:  Call the office to receive refills.  Be aware that certain pain meds cannot be called into a pharmacy and actually require a paper prescription.  A change may be made in your pain med as you progress thru your recovery period or if you have side effects to certain meds.    --Pain meds are NOT refilled after 5pm on weekdays, and NOT AT ALL on the weekends, so please look ahead to prevent problems.      Q:  Why am I having a  hard time sleeping now that I am at home?  A:  Many medications you receive while you are in the hospital can impact your sleep for a number of days after your surgery/hospitalization.  Decreased level of activity and naps during the day may also make sleeping at night difficult.  Try to minimize day-time naps, and get up frequently during the day to walk around your home during your recovery time.  Sleep aides may be of some help, but are not recommended for long-term use.      Q:  I am having some back discomfort.  What should I do?  A:  This may be related to certain positioning that was required for your surgery, extended periods of time in bed, or other changes in your overall activity level.  You may try ice, heat, acetaminophen, or ibuprofen to treat this temporarily.  Note that many pain medications have acetaminophen in them and would state this on the prescription bottle.  Be sure not to exceed the maximum of 4000mg per day of acetaminophen.     **If the pain you are having does not resolve, is severe, or is a flare of back pain you have had on other occasions prior to surgery, please contact your primary physician for further recommendations or for an appointment to be examined at their office.    Q:  Why am I having headaches?  A:  Headaches can be caused by many things:  caffeine withdrawal, use of pain meds, dehydration, high blood pressure, lack of sleep, over-activity/exhaustion, flare-up of usual migraine headaches.  If you feel this is related to muscle tension (a band-like feeling around the head, or a pressure at the low-back of the head) you may try ice or heat to this area.  You may need to drink more fluids (try electrolyte drink like Gatorade), rest, or take your usual migraine medications.   **If your headaches do not resolve, worsen, are accompanied by other symptoms, or if your blood pressure is high, please call your primary physician for recommendation and/or examination.    Q:  I am  unable to urinate.  What do I do?  A:  A small percentage of people can have difficulty urinating initially after surgery.  This includes being able to urinate only a very small amount at a time and feeling discomfort or pressure in the very low abdomen.  This is called  urinary retention , and is actually an urgent situation.  Proceed to your nearest Emergency department for evaluation (not an Urgent Care Center).  Sometimes the bladder does not work correctly after certain medications you receive during surgery, or related to certain procedures.  You may need to have a catheter placed until your bladder recovers.  When planning to go to an Emergency department, it may help to call the ER to let them know you are coming in for this problem after a surgery.  This may help you get in quicker to be evaluated.  **If you have symptoms of a urinary tract infection, please contact your primary physician for the proper evaluation and treatment.          If you have other questions, please call the office Monday thru Friday between 8am and 5pm to discuss with the nurse or physician assistant.  #[phone]    There is a surgeon ON CALL on weekday evenings and over the weekend in case of urgent need only, and may be contacted at the same number.    If you are having an emergency, call 911 or proceed to your nearest emergency department.

## 2019-09-25 NOTE — PLAN OF CARE
AVS discussed with patient and wife. Follow up instructions provided. Oxycodone sent with patient, patient to  other medication at [facility]. [facility] nurse educated on pouch, pt denies questions at this time. Pt to discharge via private vehicle with wife, all belongings sent with patient.

## 2019-09-25 NOTE — PROGRESS NOTES
[facility]   General Surgery Progress Note          Assessment and Plan:   Assessment:   POD#6 s/p laparoscopic-assisted sigmoid colectomy with end colostomy  Complicated diverticulitis with abscess  Post-operative ileus as expected - resolved  Mild protein-calorie malnutrition      Plan:   -Diet: low fiber, continue boost shakes, added probiotics  -Mild protein-calorie malnutrition: RD following  -IV ABX: Course of Meropenem and diflucan completed. No further abx needed.   -VTE PPX: Lovenox, PCDs  -Pain mgmt: Scheduled tylenol. Prn oxycodone, ibupofen and valium.   -Increase activity as tolerated, walking  -WOC nurse following for ostomy teaching, appreciated  -Disposition: will plan to discharge today after WOC RN teaching         Interval History:   Sitting up in bed, doing well. Continues to do well. He has had some transient abdominal pain as expected. Some tenderness at his incision. He is tolerating diet just fine, passing stool and flatus. He is up walking the halls. Voiding independently. He would like more ostomy teaching then will be ready to go home.         Physical Exam:   Blood pressure 131/86, pulse 74, temperature 99  F (37.2  C), temperature source Oral, resp. rate 16, weight 67.9 kg (149 lb 11.2 oz), SpO2 95 %.    I/O last 3 completed shifts:  In: 120 [P.O.:120]  Out: 200 [Stool:200]    Abdomen: soft, mildly distended, +mild central tenderness (incisinoal)  Inc(s) - c/d/i, +echymosis  Colostomy - stoma pink/viable, +stool in bag          Data:     Recent Labs   Lab 09/25/19  0700 09/22/19  0721 09/21/19  0702 09/20/19  0653 09/19/19  0735   WBC  --   --  8.5 9.3 6.4   HGB  --   --  8.0* 10.8* 11.8*   HCT  --   --  25.0* 33.8* 37.6*   MCV  --   --  92 [phone] 92    [phone]            Atul Merchant PA-C

## 2019-09-25 NOTE — PLAN OF CARE
POD #6. VSS. Afebrile. LS clear. BS hypoactive. Pt having RLQ abd pain, received oxy x1. Denies nausea. Low Fiber diet, tolerating without issues. Independently ambulating. Incisions ESTHER, no drainage noted. PIV - SL. Ostomy with gas and stool output. Plan to discharge today after seen by WOC.

## 2019-09-25 NOTE — PLAN OF CARE
Ambulatory Status:  Pt up Ind in RM/ SBA in halls. Walked halls with wife.  VS:  stable; afebrile.  Pain:  6/10 in abdomen; gave PO oxycodone X2.  Resp: LS clear on RA  GI:  denies nausea.  Lower fiber  diet.  BS active.  Passing flatus.  Last BM 9/24; ostomy in place.  :  voiding.  Skin:  midline incision dressing CDI. Lap sites closed with steri strips.  Labs:  K 4.2.  Consults:  WOC  Disposition:  possible discharge tomorrow after WOC teaching.

## 2019-09-25 NOTE — PROGRESS NOTES
"[facility] Nurse Inpatient Ostomy Assessment      Assessment of ostomy and needs for:  End  Colostomy      Data:   History:      Per MD note(s):  54 year old male with a PMH of diverticulitis, admitted to the hospital with worsening complicated diverticulitis and need for urgent sigmoid colectomy.  At this point he remains hemodynamically stable without peritonitis.   DX: complicated sigmoid diverticulitis with microperforation and abscess.   Procedure:   1.Laparoscopic-assisted  sigmoid colectomy with end descending colostomy.    2.  Complete  takedown  of  splenic  flexure   3.  Ureteral  stent  placement  by  Urology      Type of ostomy:  Colostomy  Stoma assessment:   ? Size of stoma: slightly smaller than  1 1/2\" round pink red and moist with good protrusion, improved edema, and  Functioning with stool and flatus  Mucocutaneous Junction (MCJ):  Intact with sutures  Peristomal skin: intact, no erosion, stab surgical site under wafer at around 3 o'clock covered with dry steri strips, small blister at 2 o' no sting applied  Abdominal assessment: midline incision intact  Output:  scant , brown stool      Intake/Output Summary (Last 24 hours) at 9/25/2019 1141  Last data filed at 9/24/2019 2000  Gross per 24 hour   Intake --   Output 200 ml   Net -200 ml     Current pouching system:  Coloplast  one piece, flat and placed Monday   Pain: tender   ? Planned to  discharge today    Diet:       Orders Placed This Encounter      Low Fiber Diet    Labs:   Recent Labs   Lab Test 09/21/19  0702  09/04/19  1913  08/19/19  0756   ALBUMIN  --   --   --   --  4.2   HGB 8.0*   < > 12.8*   < > 15.6   INR  --   --  1.06  --   --    WBC 8.5   < > 11.0   < > 11.4*    < > = values in this interval not displayed.           Intervention:     Patient's chart evaluated.      Assessments done today:  Stoma and 2nd pouch change    Education: continued demo and hands on 2nd pouch change, discussed products, skin care, " activities at home with pt and spouse;     Prepared for discharge by: Supplies ordered, Prescriptions complete, Discussed when to follow up with a WO Nurse in the future, Discussed how/ where to order supplies and ansewered all questions    Pt registered for ostomy supply samples? Yes: on discharge with Coloplast         Assessment:     Stoma assessment: viable, pink-red, round, moist and functioning ready for discharge    Learning needs/ comprehension: no prior experience, pt was less anxious today and very engaged, assisted with pouch change along with spouse who was assisting pouch change and both were asking appropriate questions,  pt works with quartz and granite counter tops, children are in [place] visiting family this summer and spouse will need to return to [place] to retrieve them; Ages 1 and 2.    Effectiveness of current pouching/ supply plan: TBD          Plan:     Plan:   ? Current pouching system: Coloplast 1 pc with ring    Education:  ? Education completed:  Pouch Emptying and Pouch Replacement, How to empty pouch, Removal of pouch, Preparation of new pouch, Cutting out or evaluating a pattern, Applying paste or rings, Applying appliance to abdomen, Peristomal skin care, Diet and hydration / fluid balance, Odor / flatus management and Lifestyle Adjustments, intimacy and travel  o Supply company: pt will call insurance company  o Do WOC Nurse recommend home care ? No, per pt and spouse

## 2019-09-26 NOTE — DISCHARGE SUMMARY
[facility]    Discharge Summary  Surgery    Date of Admission:  9/18/2019  Date of Discharge:  9/25/2019  2:14 PM  Discharging Provider: Atul Merchant PA-C  Discharge Summary Note completed by: Desiree Flores PA-C on 9/26/2019  Date of Service: The patient was personally seen by Discharging Providers on the day of discharge.    Discharge Diagnoses   Active Problems:    Diverticulitis of large intestine with perforation    Diverticulitis large intestine      Procedure/Surgery Information   Procedure(s):  LAPAROSCOPY-ASSISTED, SIGMOIDCOLECTOMY WITH END COLOSTOMY  PREOPERATIVE BILATERAL URETERAL STENT PLACEMENT   Surgeon(s) and Role:  Panel 1:     * Jaimee Barrios MD - Primary     * Rosy Rush PA-C - Assisting     * Jeevan Conway MD - Assisting     * Desiree Flores PA-C - Assisting  Panel 2:     * Toni Liao MD - Primary     Specimens: ID Type Source Tests Collected by Time Destination   A : Sigmoid Colon Tissue Large Intestine, Sigmoid SURGICAL PATHOLOGY EXAM Jaimee Barrios MD 9/19/2019  4:29 PM       Non-operative procedures: None performed       History of Present Illness   Jim Mixon is a 54 year old male who presented with a history of recurrent sigmoid diverticulitis.  He was initially admitted on 8/19/2019 with complicated diverticulitis.  CT scan showed a very small amount of free air within the mesentery and stranding consistent with possible early abscess.  He clinically improved with IV antibiotics and bowel rest and was discharged on 8/22/19.  This was a second episode of diverticulitis.  His previous episode of diverticulitis was approximately 5 years ago which occurred after he had his for screening colonoscopy at the age of 49.  He was readmitted on 9/4/19 with increasing LLQ pain, CT was performed  and revealed worsening diverticulitis with multiple abscesses. He underwent CT guided diverticular abscess drainage on  9/5.   Repeat CT on 9/8 showed drain adequately controlling abscess, and stable intramural abscess and inflammation. Patient was discharged home 9/10/19 with drain in place, as well as Bactrim, Augmentin, Fluconazole for 2 weeks per ID recommendations based on drain cultures.  On return to clinic 9/18/2019, he was clinically significantly worse than the day of discharge and repeat CT scan on the day prior showed no improvement in size of the intramural abscess.  Patient had been unable to take really any significant p.o. since discharge the week before and was having severe pain with bowel movements as well as feculent drain output.  Given these we discussed proceeding with more urgent laparoscopic  sigmoid  colectomy with ostomy creation, as he is unable to get a bowel prep.     Hospital Course   Jim Mixon was admitted on 9/18/2019.  The following problems were addressed during his hospitalization:  Patient Active Problem List   Diagnosis     Diverticulitis     Yeast infection     Escherichia coli (E. coli) infection     Intra-abdominal abscess (H)     Diverticulitis of large intestine with perforation     Diverticulitis large intestine       Gina-operative antibiotic therapy included: Meropenem and Diflucan.  Post-operative pain control: was via IV until able to tolerate PO intake and transitioned to PO pain meds.    Remarkable hospital course events: patient experienced postop ileus as expected.  WOCN managed colostomy care during hospital stay.     Jim met all criteria for release on 9/25/2019  2:14 PM.  He was afebrile, tolerating diet, pain controlled on PO meds, ambulating well, and had return of bowel function.    Medications discontinued or adjusted during this hospitalization: see discharge med list below.    Antibiotics prescribed at discharge: none prescribed    Imaging study follow up needs:   -No studies require specific follow-up    Discharge Instructions and Follow-Up:  Discharge  diet: Regular   Discharge activity: Lifting restricted to 20 pounds   Discharge follow-up: Follow up with Dr. Barrios in 2-3 weeks   Wound/Incision care: May get incision wet in shower but do not soak or scrub       Desiree Flores PA-C      Discharge Disposition   Discharged to home   Condition at discharge: Good    Pending Results   Final pathology results: Diverticulitis with areas of abscess formation and features consistent   with perforation, negative malignancy.  Unresulted Labs Ordered in the Past 30 Days of this Admission     No orders found from 8/19/2019 to 9/19/2019.          Primary Care Physician   [facility]    Consultations This Hospital Stay   WOUND OSTOMY CONTINENCE NURSE  IP CONSULT  NUTRITION SERVICES ADULT IP CONSULT  CARE COORDINATOR IP CONSULT    Discharge Orders   No discharge procedures on file.  Discharge Medications   Discharge Medication List as of 9/25/2019 12:03 PM      CONTINUE these medications which have CHANGED    Details   oxyCODONE (ROXICODONE) 5 MG tablet Take 1-2 tablets (5-10 mg) by mouth every 6 hours as needed for moderate to severe pain, Disp-30 tablet, R-0, Local Print      saccharomyces boulardii (FLORASTOR) 250 MG capsule Take 1 capsule (250 mg) by mouth 2 times daily for 14 days, Disp-28 capsule, R-0, E-Prescribe         CONTINUE these medications which have NOT CHANGED    Details   acetaminophen (TYLENOL) 500 MG tablet Take 1,000 mg by mouth every 6 hours as needed for mild pain, Historical      esomeprazole (NEXIUM) 20 MG DR capsule Take 20 mg by mouth every morning (before breakfast) Take 30-60 minutes before eating., Historical      LORazepam (ATIVAN) 1 MG tablet Take 1 mg by mouth every 6 hours as needed for anxiety, Historical      ondansetron (ZOFRAN-ODT) 4 MG ODT tab Take 1 tablet (4 mg) by mouth every 8 hours as needed for nausea, Disp-30 tablet, R-1, E-Prescribe      traZODone (DESYREL) 50 MG tablet Take 50 mg by mouth nightly as needed for sleep,  Historical         STOP taking these medications       fluconazole (DIFLUCAN) 200 MG tablet Comments:   Reason for Stopping:             Allergies   Allergies   Allergen Reactions     Ciprofloxacin      Data   Most Recent 3 CBC's:  Recent Labs   Lab Test 09/25/19  0700 09/22/19  0721 09/21/19  0702 09/20/19  0653 09/19/19  0735   WBC  --   --  8.5 9.3 6.4   HGB  --   --  8.0* 10.8* 11.8*   MCV  --   --  92 90 92    332 316 429 347      Most Recent 3 BMP's:  Recent Labs   Lab Test 09/24/19  0634 09/23/19  0800 09/21/19  0702    138 138   POTASSIUM 4.2 4.0 4.7   CHLORIDE 106 105 108   CO2 29 29 28   BUN 9 7 17   CR 0.91 0.79 1.15   ANIONGAP 4 4 2*   JOSEFINA 9.6 9.2 8.7   GLC 97 100* 114*     Most Recent 2 LFT's:  Recent Labs   Lab Test 08/19/19  0756   AST 28   ALT 52   ALKPHOS 90   BILITOTAL 0.8     Most Recent INR's and Anticoagulation Dosing History:  Anticoagulation Dose History     Recent Dosing and Labs Latest Ref Rng & Units 9/4/2019    INR 0.86 - 1.14 1.06        Most Recent 3 Troponin's:No lab results found.  Most Recent Cholesterol Panel:No lab results found.  Most Recent 6 Bacteria Isolates From Any Culture (See EPIC Reports for Culture Details):  Recent Labs   Lab Test 09/05/19  1610   CULT Heavy growth  Escherichia coli  *  Moderate growth  Enterococcus faecalis  *  Moderate growth  Candida albicans / dubliniensis  Candida albicans and Candida dubliniensis are not routinely speciated  Susceptibility testing not routinely done  *  Light growth  Mixed gram positive william    Moderate growth  Fusobacterium species  Susceptibility testing not routinely done  *  Heavy growth  Bacteroides thetaiotaomicron  Susceptibility testing not routinely done  *  Heavy growth  Mixed aerobic and anaerobic william  *     Most Recent TSH, T4 and A1c Labs:No lab results found.  Results for orders placed or performed during the hospital encounter of 09/17/19   CT Abdomen Pelvis w Contrast    Narrative    CT ABDOMEN  AND PELVIS WITH CONTRAST   9/17/2019 3:24 PM     HISTORY: Diverticulitis with abscess. Diverticulitis of colon.    TECHNIQUE:   Axial CT images of the abdomen and pelvis were obtained  following the administration of intravenous contrast with a dosage of  81mL Isovue-370.     Radiation dose for this scan was reduced using automated exposure  control, adjustment of the mA and/or kV according to patient size, or  iterative reconstruction technique.    COMPARISON: 9/8/2019.    FINDINGS:  Imaged lung bases are clear. No concerning focal liver lesion  identified. Small amount of fatty infiltration of the liver along the  fissure of the ligamentum teres. The gallbladder is absent. No biliary  ductal dilatation appreciated. The hepatic, portal, splenic and  superior mesenteric veins are patent.    The spleen is within normal limits, measuring up to approximately 11.3  cm in craniocaudal length. The pancreas and adrenal glands demonstrate  no significant abnormality. A few tiny hypodense renal lesions again  noted, too small to characterize. The abdominal aorta is normal in  course and caliber.    Again seen is diffuse thickening of the sigmoid colon with numerous  sigmoid diverticula. A percutaneous pigtail catheter is again placed  adjacent to the sigmoid colon, in unchanged position. No significant  fluid reaccumulation in that location. As seen on the prior  examination, there is an elongated rim-enhancing fluid collection with  internal foci of air along the undersurface of the mid to distal  sigmoid colon measuring approximately 4.5 x 1.3 x 1.3 cm, not  significantly changed from the most recent exam. This again appears  most compatible with an intramural/subserosal abscess, along the  posterior inferior wall of the sigmoid colon (series 4 image 31,  series 3 image 60). Multiple tiny foci of air adjacent to the pigtail  catheter are again noted, slightly increased from prior. No other  acute findings identified.       Impression    IMPRESSION:  1. No significant change in the elongated rim-enhancing fluid  collection along the undersurface of the mid to distal sigmoid colon,  likely representing an intramural abscess.  2. No significant change in the position of the percutaneously placed  drain in the left lower quadrant. Multiple small foci of air adjacent  to the drain have mildly increased, potentially related to recent  instrumentation. Clinical correlation is recommended.  3. No definite new drainable fluid collection in the abdomen or  pelvis. Otherwise stable exam.    STEWART DE SOUZA MD

## 2019-10-01 ENCOUNTER — TELEPHONE (OUTPATIENT)
Dept: SURGERY | Facility: CLINIC | Age: 54
End: 2019-10-01

## 2019-10-01 NOTE — TELEPHONE ENCOUNTER
"  GENERAL SURGERY NURSE PHONE TRIAGE   Jim Mixon    MRN# [id number]  AGE:  54 year old  YOB: 1965  [phone] (home)   Surgeon: Dr. Barrios  Surgical Assist:  Desiree Flores PA-C     Surgery type:   1.  Laparoscopic-assisted  sigmoid colectomy with end descending colostomy.    2.  Complete  takedown  of  splenic  flexure   3.  Ureteral  stent  placement  by  Urology     Surgery Date: September / 19 / 2019   Discharged:  9/25/19     POD: 12     CHIEF CONCERN:    Discomfort / pain with BM's and gas  Nausea without vomiting  Hard stools   Plan at discharge:  Plan:   -Diet: low fiber, continue boost shakes, added probiotics  -Mild protein-calorie malnutrition: RD following  -IV ABX: Course of Meropenem and diflucan completed. No further abx needed.   -VTE PPX: Lovenox, PCDs  -Pain mgmt: Scheduled tylenol. Prn oxycodone, ibuprofen and valium.   -Increase activity as tolerated, walking  -WOC nurse following for ostomy teaching, appreciated  -Disposition: will plan to discharge today after WOC RN teaching     Patient reports pain with bowel movements and gas.  (\"low level\" of pain)  Is taking oxycodone   Nausea (without vomiting) which affects his appetite.  Is taking Zofran.    Reporting LBM today. Hard, small pebble like.  He is currently taking psyllium 1 tablet BID and probiotics.  Has a difficult time drinking water, estimates 1 litre a day.    We discussed other ways to increase fluids in diet, Jello, high water fruits like watermelon.   May recommend, stool softener and / or laxative.  PLAN:   Will discuss plan with clinic JANETTE Watts RN on 10/1/2019 at 12:41 PM  ADDENDUM:   Patient instructed to:   Increase fiber in diet.  Fruits and vegetables  Take Metamucil, non generic- that MUST be mixed with water.  - we discussed Trazodone refill. Patient was on ativan it was stopped due to interactions with Oxycodone.  Patient will continue Ativan (rx per PCP) after he is " through with Oxycodone.    Patient also encouraged to call with any updates, questions or concerns.  Sujey Watts RN on 10/1/2019 at 2:00 PM

## 2019-10-02 NOTE — OP NOTE
Procedure Date: 2019      PREOPERATIVE DIAGNOSIS:  Complicated sigmoid diverticulitis.      POSTOPERATIVE DIAGNOSIS:  Complicated sigmoid diverticulitis.      PROCEDURE:  Video cystoscopy, panendoscopy, bilateral ureteral catheter insertion.      SURGEON:  Toni Jang MD      ANESTHESIA:  General endotracheal.      ESTIMATED BLOOD LOSS:  0 mL.      INDICATIONS:  The patient is a 54-year-old male with recurrent sigmoid diverticulitis with CT evidence of perforation.  He also has stranding consistent with possible early abscess.  He is now undergoing laparoscopic-assisted sigmoid colectomy and I have been asked to place ureteral catheters.      The patient's past  history is unremarkable.      DESCRIPTION OF THE PROCEDURE:  The patient was taken to the operating suite and received IV antibiotics as ordered by Dr. Barrios.  After adequate general endotracheal anesthetic, the patient was placed in lithotomy position and his genitalia were prepped and draped in a sterile fashion.  A #22 Malay Storz cystoscope with 30-degree lens and video were used to visualize the urethra and bladder, using water as an irrigant.  The urethra appeared normal and the prostatic fossa was open with normal mucosa.  Bladder neck was normal.  Bladder mucosa was normal with no evidence of fistula.  Bilateral ureteral catheters were easily placed on the right and left side.  The cystoscope was easily removed and a Ross catheter inserted in the bladder.  Saline was placed in the Ross balloon and the Ross was placed to closed gravity drainage after the ureteral catheters were brought through the Ross drainage port and secured to the Ross with a 2-0 silk suture.  Rectal exam revealed a small benign prostate.         TONI JANG JR, MD             D: 10/01/2019   T: 10/01/2019   MT: BELIA      Name:     YISEL COUGHLINO   MRN:      [id number]        Account:        [id number]   :      1965            Procedure Date: 09/19/2019      Document: [id number]       cc: Toni Liao Jr, MD

## 2019-10-04 ENCOUNTER — TELEPHONE (OUTPATIENT)
Dept: SURGERY | Facility: CLINIC | Age: 54
End: 2019-10-04

## 2019-10-04 NOTE — TELEPHONE ENCOUNTER
S/p 1.  Laparoscopic-assisted  sigmoid colectomy with end descending colostomy.    2.  Complete  takedown  of  splenic  flexure  9/19/19.  Surgeon:  Dr. Barrios      Pt reports that since changing to high fiber diet earlier this week (see 10/1 telephone encounter) he has been having more frequent BM's without pain or discomfort.    He continues with intermittent nausea.  Early this morning he reports he had an episode of dry heaves.  He is taking Zofran prn every 8 hours which does offer adequate relief of nausea.    He is taking 1 tab oxycodone as needed for pain, states that he only takes occasionally.   Pt also reports that he is taking 50mg trazadone as prescribed by  for sleep - 2 pills left, but he continues with difficulty falling and staying asleep.   Wondering if he can get a refill and increase dose of trazadone and if he can take zofran more frequently than every 8 hours.      Discussed with Atul Merchant PA-C.   Patient should continue with zofran every 8 hours prn, but he should eat 6 small meals/day rather than 3 regular meals.  PA declined to refill trazadone.  This will need to be addressed by Dr. Barrios at post-op follow up appointment on Monday 10/7.  Until then, he can try adding OTC Melatonin 5 mg at bedtime.     Pt verbalized understanding and agrees with plan.

## 2019-10-07 ENCOUNTER — OFFICE VISIT (OUTPATIENT)
Dept: SURGERY | Facility: CLINIC | Age: 54
End: 2019-10-07
Payer: COMMERCIAL

## 2019-10-07 VITALS
WEIGHT: 149 LBS | SYSTOLIC BLOOD PRESSURE: 132 MMHG | RESPIRATION RATE: 16 BRPM | BODY MASS INDEX: 23.39 KG/M2 | HEIGHT: 67 IN | DIASTOLIC BLOOD PRESSURE: 70 MMHG | HEART RATE: 75 BPM | OXYGEN SATURATION: 98 %

## 2019-10-07 DIAGNOSIS — F51.01 PRIMARY INSOMNIA: ICD-10-CM

## 2019-10-07 DIAGNOSIS — Z09 SURGICAL FOLLOWUP VISIT: Primary | ICD-10-CM

## 2019-10-07 PROCEDURE — 99024 POSTOP FOLLOW-UP VISIT: CPT | Performed by: SURGERY

## 2019-10-07 RX ORDER — ZOLPIDEM TARTRATE 5 MG/1
5 TABLET ORAL
Qty: 20 TABLET | Refills: 0 | Status: SHIPPED | OUTPATIENT
Start: 2019-10-07 | End: 2019-11-06

## 2019-10-07 ASSESSMENT — MIFFLIN-ST. JEOR: SCORE: 1469.49

## 2019-10-07 NOTE — PROGRESS NOTES
[facility] Follow Up    Subjective:  Jim is here for his first postoperative visit. He underwent lap assisted sigmoid colectomy with mcconnell's pouch and end ostomy on 9/19/19. Discharged 9/25. Today he  tells me he is doing quite well. He is eating a normal diet, states he is very hungry and eating all the time. Having stool through the ostomy daily. Went down to 1 metamucil daily as stools were runny with twice daily. No issues pouching. Has occasional pain, usually on the left side of his abdomen, sometimes the right side. Takes oxycodone sparingly and has 5 pills left. Also using ibuprofen and tylenol.  Having trouble sleeping, can't fall asleep at night. Trazodone isn't helping.     Objective:  Abd - soft, non-tender, non-distended  Inc(s) - c/d/i, healing well, no erythema, +normal healing ridge, no masses  Ostomy - LLQ pink, patent, protruding. Solid stool in bag.    Path:   FINAL DIAGNOSIS:   Sigmoid colon, colectomy.   - Diverticulitis with areas of abscess formation and features consistent   with perforation.  Negative for   malignancy.     Plan:  #20 ambien 5mg tablets prescribed for sleep prn - discussed side effects (sleep walking specifically and to call if having problems so we can choose another medication)  Activity limitations reviewed - 6 weeks lifting <20lb. Can start doing slow elliptical exercise 4 weeks from surgery if feeling well.  Continue regular diet, goal to maintain weight or back to baseline weight.  Continue metamucil daily, can increase based on stool consistency if needed  OK to travel to [facility] at 6 weeks postop, discussed DVT prevention.  Ostomy takedown planned for 3-4 months from original surgery  Will schedule clinic follow up for early December to plan ostomy takedown for January if everything is still healing well.  Repeat colonoscopy prior to takedown    Jaimee Barrios MD

## 2019-10-07 NOTE — LETTER
2019    RE: Jim Mixon, : 1965      [facility] Follow Up     Subjective:  Jim is here for his first postoperative visit. He underwent lap assisted sigmoid colectomy with mcconnell's pouch and end ostomy on 19. Discharged . Today he  tells me he is doing quite well. He is eating a normal diet, states he is very hungry and eating all the time. Having stool through the ostomy daily. Went down to 1 metamucil daily as stools were runny with twice daily. No issues pouching. Has occasional pain, usually on the left side of his abdomen, sometimes the right side. Takes oxycodone sparingly and has 5 pills left. Also using ibuprofen and tylenol.  Having trouble sleeping, can't fall asleep at night. Trazodone isn't helping.      Objective:  Abd - soft, non-tender, non-distended  Inc(s) - c/d/i, healing well, no erythema, +normal healing ridge, no masses  Ostomy - LLQ pink, patent, protruding. Solid stool in bag.     Path:   FINAL DIAGNOSIS:   Sigmoid colon, colectomy.   - Diverticulitis with areas of abscess formation and features consistent   with perforation.  Negative for   malignancy.      Plan:  #20 ambien 5mg tablets prescribed for sleep prn - discussed side effects (sleep walking specifically and to call if having problems so we can choose another medication)  Activity limitations reviewed - 6 weeks lifting <20lb. Can start doing slow elliptical exercise 4 weeks from surgery if feeling well.  Continue regular diet, goal to maintain weight or back to baseline weight.  Continue metamucil daily, can increase based on stool consistency if needed  OK to travel to [facility] at 6 weeks postop, discussed DVT prevention.  Ostomy takedown planned for 3-4 months from original surgery  Will schedule clinic follow up for early December to plan ostomy takedown for January if everything is still healing well.  Repeat colonoscopy prior to takedown     Jaimee Barrios  MD

## 2019-10-11 NOTE — TELEPHONE ENCOUNTER
REFILL REQUEST GENERAL SURGERY   Jim Mixon      MRN# [id number]  AGE:  54 year old     YOB: 1965  [phone] (home) none (work)     Patient was last seen in clinic 10/7/19 with Dr. Barrios.    Jim Mixon is a 54 year old male who called requesting a refill for pain medication.  Patient medication request: oxycodone  Type/Amount of pain medication in current use: oxycodone   Last Written Prescription Date:  9/25/19  Last Fill Quantity: 30,  # refills: 0   Are you prescribed any other pain medication by any other provider?  no is taking Ambien for sleep.    Patient reports last night he took his last Oxycodone.  He has a difficult time sleeping without it.  Pain in back, he believes is do to post surgical muscle wasting.  He has been more active lately.    - would like it refilled by 3 pm if possible, wife has a doctors appointment.    PLAN:   Refill request forwarded to clinic PA-C for approval.   .Pt also recommended to call office at any time if ongoing questions/concerns during recovery. Pt is in agreement with this plan.  Sujey Watts RN on 10/11/2019 at 12:16 PM    Discussed with Clinic PA-C.  Refill denied.  Called patient, he is at a doctors appointment with his wife, he will call back later.  Sujey Watts RN on 10/11/2019 at 3:35 PM    Patient came up to clinic. Refill denied, if pain does not improve, he will call us back.  Sujey Watts RN on 10/11/2019 at 4:08 PM

## 2019-10-14 NOTE — TELEPHONE ENCOUNTER
REFILL REQUEST GENERAL SURGERY   Refill request: first  Jim Mixon      MRN# [id number]  AGE:  54 year old     YOB: 1965  [phone] (home) none (work)     Surgeon: Dr. Barrios  Surgical assist: Desiree Flores PA-C      Surgery type: Surgery type:   1.  Laparoscopic-assisted  sigmoid colectomy with end descending colostomy.    2.  Complete  takedown  of  splenic  flexure   3.  Ureteral  stent  placement  by  Urology      Surgery Date: September / 19 / 2019      POD: 25   Jim Mixon is a 54 year old male who called requesting a refill for pain medication.  Patient medication request: Ambien  Last Written Prescription Date:  10/7/19  Last Fill Quantity: 20,  # refills: 0   Last office visit: 10/7/2019 with prescribing provider:  Dr. Barrios     Patient reports Dr. Barrios had told him the prescription she wrote for him was 1/2 the normal dose.  He would like to increase dose and ask for a refill/  Discussed with clinic JANETTE, as patient is 3 + weeks PO will recommend he discuss with PCP.  PLAN:   Patient will call PCP  Sujey Watts RN on 10/14/2019 at 1:51 PM

## 2019-11-06 ENCOUNTER — APPOINTMENT (OUTPATIENT)
Dept: CT IMAGING | Facility: CLINIC | Age: 54
End: 2019-11-06
Attending: EMERGENCY MEDICINE
Payer: COMMERCIAL

## 2019-11-06 ENCOUNTER — HOSPITAL ENCOUNTER (OUTPATIENT)
Facility: CLINIC | Age: 54
Setting detail: OBSERVATION
Discharge: HOME | End: 2019-11-07
Attending: EMERGENCY MEDICINE | Admitting: INTERNAL MEDICINE
Payer: COMMERCIAL

## 2019-11-06 ENCOUNTER — TELEPHONE (OUTPATIENT)
Dept: SURGERY | Facility: CLINIC | Age: 54
End: 2019-11-06

## 2019-11-06 DIAGNOSIS — R11.10 VOMITING AND DIARRHEA: ICD-10-CM

## 2019-11-06 DIAGNOSIS — R19.7 VOMITING AND DIARRHEA: ICD-10-CM

## 2019-11-06 DIAGNOSIS — N28.9 ACUTE RENAL INSUFFICIENCY: ICD-10-CM

## 2019-11-06 DIAGNOSIS — E86.0 DEHYDRATION: ICD-10-CM

## 2019-11-06 DIAGNOSIS — R10.84 ABDOMINAL PAIN, GENERALIZED: ICD-10-CM

## 2019-11-06 PROBLEM — K52.9 GASTROENTERITIS: Status: ACTIVE | Noted: 2019-11-06

## 2019-11-06 PROBLEM — N17.9 ACUTE KIDNEY INJURY (H): Status: ACTIVE | Noted: 2019-11-06

## 2019-11-06 LAB
ALBUMIN SERPL-MCNC: 4.9 G/DL (ref 3.4–5)
ALP SERPL-CCNC: 102 U/L (ref 40–150)
ALT SERPL W P-5'-P-CCNC: 40 U/L (ref 0–70)
ANION GAP SERPL CALCULATED.3IONS-SCNC: 13 MMOL/L (ref 3–14)
AST SERPL W P-5'-P-CCNC: 16 U/L (ref 0–45)
BASOPHILS # BLD AUTO: 0 10E9/L (ref 0–0.2)
BASOPHILS NFR BLD AUTO: 0.3 %
BILIRUB SERPL-MCNC: 0.9 MG/DL (ref 0.2–1.3)
BUN SERPL-MCNC: 41 MG/DL (ref 7–30)
CALCIUM SERPL-MCNC: 9.9 MG/DL (ref 8.5–10.1)
CHLORIDE SERPL-SCNC: 104 MMOL/L (ref 94–109)
CO2 BLDCOV-SCNC: 18 MMOL/L (ref 21–28)
CO2 SERPL-SCNC: 17 MMOL/L (ref 20–32)
CREAT SERPL-MCNC: 1.83 MG/DL (ref 0.66–1.25)
DIFFERENTIAL METHOD BLD: ABNORMAL
EOSINOPHIL # BLD AUTO: 0.3 10E9/L (ref 0–0.7)
EOSINOPHIL NFR BLD AUTO: 2.3 %
ERYTHROCYTE [DISTWIDTH] IN BLOOD BY AUTOMATED COUNT: 13.2 % (ref 10–15)
GFR SERPL CREATININE-BSD FRML MDRD: 41 ML/MIN/{1.73_M2}
GLUCOSE SERPL-MCNC: 176 MG/DL (ref 70–99)
HCT VFR BLD AUTO: 52.3 % (ref 40–53)
HGB BLD-MCNC: 17.3 G/DL (ref 13.3–17.7)
IMM GRANULOCYTES # BLD: 0.1 10E9/L (ref 0–0.4)
IMM GRANULOCYTES NFR BLD: 0.6 %
LACTATE BLD-SCNC: 1.9 MMOL/L (ref 0.7–2.1)
LYMPHOCYTES # BLD AUTO: 2.5 10E9/L (ref 0.8–5.3)
LYMPHOCYTES NFR BLD AUTO: 22.7 %
MCH RBC QN AUTO: 29 PG (ref 26.5–33)
MCHC RBC AUTO-ENTMCNC: 33.1 G/DL (ref 31.5–36.5)
MCV RBC AUTO: 88 FL (ref 78–100)
MONOCYTES # BLD AUTO: 0.9 10E9/L (ref 0–1.3)
MONOCYTES NFR BLD AUTO: 8.3 %
NEUTROPHILS # BLD AUTO: 7.1 10E9/L (ref 1.6–8.3)
NEUTROPHILS NFR BLD AUTO: 65.8 %
NRBC # BLD AUTO: 0 10*3/UL
NRBC BLD AUTO-RTO: 0 /100
PCO2 BLDV: 35 MM HG (ref 40–50)
PH BLDV: 7.32 PH (ref 7.32–7.43)
PLATELET # BLD AUTO: 450 10E9/L (ref 150–450)
PO2 BLDV: 26 MM HG (ref 25–47)
POTASSIUM SERPL-SCNC: 3.4 MMOL/L (ref 3.4–5.3)
PROT SERPL-MCNC: 8.9 G/DL (ref 6.8–8.8)
RBC # BLD AUTO: 5.96 10E12/L (ref 4.4–5.9)
SAO2 % BLDV FROM PO2: 44 %
SODIUM SERPL-SCNC: 134 MMOL/L (ref 133–144)
TROPONIN I SERPL-MCNC: <0.015 UG/L (ref 0–0.04)
WBC # BLD AUTO: 10.8 10E9/L (ref 4–11)

## 2019-11-06 PROCEDURE — 25000128 H RX IP 250 OP 636

## 2019-11-06 PROCEDURE — 74177 CT ABD & PELVIS W/CONTRAST: CPT

## 2019-11-06 PROCEDURE — 25000125 ZZHC RX 250: Performed by: EMERGENCY MEDICINE

## 2019-11-06 PROCEDURE — 93005 ELECTROCARDIOGRAM TRACING: CPT

## 2019-11-06 PROCEDURE — 25800030 ZZH RX IP 258 OP 636: Performed by: INTERNAL MEDICINE

## 2019-11-06 PROCEDURE — 96376 TX/PRO/DX INJ SAME DRUG ADON: CPT

## 2019-11-06 PROCEDURE — 84484 ASSAY OF TROPONIN QUANT: CPT | Performed by: EMERGENCY MEDICINE

## 2019-11-06 PROCEDURE — 83605 ASSAY OF LACTIC ACID: CPT

## 2019-11-06 PROCEDURE — 85025 COMPLETE CBC W/AUTO DIFF WBC: CPT | Performed by: EMERGENCY MEDICINE

## 2019-11-06 PROCEDURE — 99219 ZZC INITIAL OBSERVATION CARE,LEVL II: CPT | Performed by: INTERNAL MEDICINE

## 2019-11-06 PROCEDURE — 25800030 ZZH RX IP 258 OP 636: Performed by: EMERGENCY MEDICINE

## 2019-11-06 PROCEDURE — 25000128 H RX IP 250 OP 636: Performed by: EMERGENCY MEDICINE

## 2019-11-06 PROCEDURE — 96375 TX/PRO/DX INJ NEW DRUG ADDON: CPT

## 2019-11-06 PROCEDURE — 99285 EMERGENCY DEPT VISIT HI MDM: CPT | Mod: 25

## 2019-11-06 PROCEDURE — 80053 COMPREHEN METABOLIC PANEL: CPT | Performed by: EMERGENCY MEDICINE

## 2019-11-06 PROCEDURE — 82803 BLOOD GASES ANY COMBINATION: CPT

## 2019-11-06 PROCEDURE — G0378 HOSPITAL OBSERVATION PER HR: HCPCS

## 2019-11-06 PROCEDURE — 25000132 ZZH RX MED GY IP 250 OP 250 PS 637: Performed by: INTERNAL MEDICINE

## 2019-11-06 PROCEDURE — 96361 HYDRATE IV INFUSION ADD-ON: CPT

## 2019-11-06 PROCEDURE — 87493 C DIFF AMPLIFIED PROBE: CPT | Performed by: INTERNAL MEDICINE

## 2019-11-06 PROCEDURE — 96374 THER/PROPH/DIAG INJ IV PUSH: CPT | Mod: 59

## 2019-11-06 RX ORDER — ACETAMINOPHEN 325 MG/1
650 TABLET ORAL EVERY 4 HOURS PRN
Status: DISCONTINUED | OUTPATIENT
Start: 2019-11-06 | End: 2019-11-07 | Stop reason: HOSPADM

## 2019-11-06 RX ORDER — HYDROMORPHONE HYDROCHLORIDE 1 MG/ML
.3-.5 INJECTION, SOLUTION INTRAMUSCULAR; INTRAVENOUS; SUBCUTANEOUS
Status: DISCONTINUED | OUTPATIENT
Start: 2019-11-06 | End: 2019-11-07 | Stop reason: HOSPADM

## 2019-11-06 RX ORDER — LANOLIN ALCOHOL/MO/W.PET/CERES
3 CREAM (GRAM) TOPICAL
Status: DISCONTINUED | OUTPATIENT
Start: 2019-11-06 | End: 2019-11-07 | Stop reason: HOSPADM

## 2019-11-06 RX ORDER — ONDANSETRON 2 MG/ML
4 INJECTION INTRAMUSCULAR; INTRAVENOUS EVERY 6 HOURS PRN
Status: DISCONTINUED | OUTPATIENT
Start: 2019-11-06 | End: 2019-11-07 | Stop reason: HOSPADM

## 2019-11-06 RX ORDER — DIPHENHYDRAMINE HCL 25 MG
25 CAPSULE ORAL ONCE
Status: DISCONTINUED | OUTPATIENT
Start: 2019-11-06 | End: 2019-11-07 | Stop reason: HOSPADM

## 2019-11-06 RX ORDER — ONDANSETRON 2 MG/ML
4 INJECTION INTRAMUSCULAR; INTRAVENOUS EVERY 30 MIN PRN
Status: DISCONTINUED | OUTPATIENT
Start: 2019-11-06 | End: 2019-11-06

## 2019-11-06 RX ORDER — IOPAMIDOL 755 MG/ML
500 INJECTION, SOLUTION INTRAVASCULAR ONCE
Status: COMPLETED | OUTPATIENT
Start: 2019-11-06 | End: 2019-11-06

## 2019-11-06 RX ORDER — NALOXONE HYDROCHLORIDE 0.4 MG/ML
.1-.4 INJECTION, SOLUTION INTRAMUSCULAR; INTRAVENOUS; SUBCUTANEOUS
Status: DISCONTINUED | OUTPATIENT
Start: 2019-11-06 | End: 2019-11-07 | Stop reason: HOSPADM

## 2019-11-06 RX ORDER — MORPHINE SULFATE 4 MG/ML
4 INJECTION, SOLUTION INTRAMUSCULAR; INTRAVENOUS
Status: COMPLETED | OUTPATIENT
Start: 2019-11-06 | End: 2019-11-06

## 2019-11-06 RX ORDER — ONDANSETRON 4 MG/1
4 TABLET, ORALLY DISINTEGRATING ORAL EVERY 6 HOURS PRN
Status: DISCONTINUED | OUTPATIENT
Start: 2019-11-06 | End: 2019-11-07 | Stop reason: HOSPADM

## 2019-11-06 RX ORDER — SODIUM CHLORIDE 9 MG/ML
INJECTION, SOLUTION INTRAVENOUS CONTINUOUS
Status: DISCONTINUED | OUTPATIENT
Start: 2019-11-06 | End: 2019-11-07 | Stop reason: HOSPADM

## 2019-11-06 RX ORDER — LORAZEPAM 1 MG/1
1 TABLET ORAL EVERY 6 HOURS PRN
Status: DISCONTINUED | OUTPATIENT
Start: 2019-11-06 | End: 2019-11-07 | Stop reason: HOSPADM

## 2019-11-06 RX ORDER — DIPHENHYDRAMINE HYDROCHLORIDE 50 MG/ML
25 INJECTION INTRAMUSCULAR; INTRAVENOUS ONCE
Status: COMPLETED | OUTPATIENT
Start: 2019-11-06 | End: 2019-11-06

## 2019-11-06 RX ORDER — OXYCODONE HYDROCHLORIDE 5 MG/1
5-10 TABLET ORAL
Status: DISCONTINUED | OUTPATIENT
Start: 2019-11-06 | End: 2019-11-07 | Stop reason: HOSPADM

## 2019-11-06 RX ORDER — ONDANSETRON 2 MG/ML
4 INJECTION INTRAMUSCULAR; INTRAVENOUS ONCE
Status: COMPLETED | OUTPATIENT
Start: 2019-11-06 | End: 2019-11-06

## 2019-11-06 RX ORDER — PANTOPRAZOLE SODIUM 20 MG/1
20 TABLET, DELAYED RELEASE ORAL
Status: DISCONTINUED | OUTPATIENT
Start: 2019-11-07 | End: 2019-11-07 | Stop reason: HOSPADM

## 2019-11-06 RX ORDER — DIPHENHYDRAMINE HYDROCHLORIDE 50 MG/ML
INJECTION INTRAMUSCULAR; INTRAVENOUS
Status: COMPLETED
Start: 2019-11-06 | End: 2019-11-06

## 2019-11-06 RX ADMIN — ONDANSETRON HYDROCHLORIDE 4 MG: 2 INJECTION, SOLUTION INTRAMUSCULAR; INTRAVENOUS at 15:54

## 2019-11-06 RX ADMIN — MORPHINE SULFATE 4 MG: 4 INJECTION INTRAVENOUS at 18:16

## 2019-11-06 RX ADMIN — MORPHINE SULFATE 4 MG: 4 INJECTION INTRAVENOUS at 20:02

## 2019-11-06 RX ADMIN — SODIUM CHLORIDE 59 ML: 9 INJECTION, SOLUTION INTRAVENOUS at 18:30

## 2019-11-06 RX ADMIN — ONDANSETRON HYDROCHLORIDE 4 MG: 2 INJECTION, SOLUTION INTRAMUSCULAR; INTRAVENOUS at 18:16

## 2019-11-06 RX ADMIN — DIPHENHYDRAMINE HYDROCHLORIDE 25 MG: 50 INJECTION, SOLUTION INTRAMUSCULAR; INTRAVENOUS at 22:20

## 2019-11-06 RX ADMIN — SODIUM CHLORIDE: 9 INJECTION, SOLUTION INTRAVENOUS at 22:46

## 2019-11-06 RX ADMIN — MORPHINE SULFATE 4 MG: 4 INJECTION INTRAVENOUS at 19:13

## 2019-11-06 RX ADMIN — SODIUM CHLORIDE, POTASSIUM CHLORIDE, SODIUM LACTATE AND CALCIUM CHLORIDE 1000 ML: 600; 310; 30; 20 INJECTION, SOLUTION INTRAVENOUS at 16:10

## 2019-11-06 RX ADMIN — IOPAMIDOL 75 ML: 755 INJECTION, SOLUTION INTRAVENOUS at 18:30

## 2019-11-06 RX ADMIN — SODIUM CHLORIDE, POTASSIUM CHLORIDE, SODIUM LACTATE AND CALCIUM CHLORIDE 1000 ML: 600; 310; 30; 20 INJECTION, SOLUTION INTRAVENOUS at 18:16

## 2019-11-06 RX ADMIN — DIPHENHYDRAMINE HYDROCHLORIDE 25 MG: 50 INJECTION, SOLUTION INTRAMUSCULAR; INTRAVENOUS at 20:47

## 2019-11-06 RX ADMIN — LORAZEPAM 1 MG: 1 TABLET ORAL at 22:53

## 2019-11-06 ASSESSMENT — ENCOUNTER SYMPTOMS
ABDOMINAL DISTENTION: 0
VOMITING: 1
BACK PAIN: 1
DYSURIA: 0
NAUSEA: 1
HEMATURIA: 0
SHORTNESS OF BREATH: 0
DIARRHEA: 1
BLOOD IN STOOL: 0
FEVER: 0
FREQUENCY: 0
ABDOMINAL PAIN: 1

## 2019-11-06 ASSESSMENT — MIFFLIN-ST. JEOR: SCORE: 1478.56

## 2019-11-06 NOTE — ED TRIAGE NOTES
Chest, back, abdominal pain since Sunday. Began with vomiting and watery diarrhea. Pt has ostomy that was placed 6 weeks ago and has had to drain it 5 times today. Better yesterday, so he flew home from [place]. Got worse again this morning. Denies blood in emesis.

## 2019-11-06 NOTE — ED PROVIDER NOTES
History     Chief Complaint:  Chest Pain; Back Pain; and Abdominal Pain      HPI   Jim Mixon is a 54 year old male with a history of GERD and diverticulitis, s/p laparoscopic assisted sigmoid colectomy with end descending colostomy on 9/19/19 due to complicated sigmoid diverticulitis with microperforation and abscess, who presents with left sided abdominal and chest pain that radiates to his upper back for the past 4 days that started while he was in [place], with worsening of his symptoms this morning. Patient reports that his symptoms began with diarrhea and emesis at onset, however, only had diarrhea 3 days ago.  He states that his vomiting and diarrhea were alleviated yesterday, so he flew here from [place]. Today, he notes that he has needed to drain his ostomy 5 times today due to diarrhea and also endorses associated nausea and 3 episodes of dry heaving. He describes his diarrhea as quite watery and more yellow in color than baseline. He believes his symptoms may be due to foods that he ate while in [place]. No abdominal pain, chest pain, or back pain while here, though patient does state he experienced abdominal pain a few hours prior to arrival. Denies bloody stool, abdominal distention, fever, shortness of breath, or urinary symptoms.     Allergies:  Ciprofloxacin     Medications:    Nexium  Ativan  Zofran  Desyrel  Ambien     Past Medical History:    Anxiety   Diverticulitis  GERD  HTN  Intra-abdominal abscess     Past Surgical History:    Cholecystectomy  Insert ureter stent  Lap assisted sigmoid colectomy     Family History:    DM    Social History:  Smoking status: never   Alcohol use: no   Drug use: no   PCP: Alex Cherry  Marital Status:        Review of Systems   Constitutional: Negative for fever.   Respiratory: Negative for shortness of breath.    Cardiovascular: Positive for chest pain (resolved).   Gastrointestinal: Positive for abdominal pain (resolved), diarrhea, nausea  and vomiting. Negative for abdominal distention and blood in stool.   Genitourinary: Negative for dysuria, frequency, hematuria and urgency.   Musculoskeletal: Positive for back pain (resolved).   All other systems reviewed and are negative.      Physical Exam     Patient Vitals for the past 24 hrs:   BP Temp Pulse Heart Rate Resp SpO2 Weight   11/06/19 2145 -- -- -- -- -- 97 % --   11/06/19 2000 (!) 162/99 -- 95 -- -- 100 % --   11/06/19 1800 (!) 146/95 -- 97 91 -- 99 % --   11/06/19 1700 (!) 134/93 -- 89 93 -- 96 % --   11/06/19 1540 -- -- 98 96 -- 98 % --   11/06/19 1535 (!) 128/104 -- -- -- -- -- --   11/06/19 1431 (!) 134/93 -- 114 114 -- 100 % 68 kg (150 lb)   11/06/19 1430 -- 97.6  F (36.4  C) -- -- 22 -- --        Physical Exam  General: Well appearing, nontoxic. Resting comfortably  Head:  Scalp, face, and head appear normal  Eyes:  Pupils are equal, round, and reactive to light    Conjunctivae non-injected and sclerae white  ENT:    The external nose is normal    Pinnae are normal    The oropharynx is normal, mucous membranes moist    Uvula is in the midline  Neck:  Normal range of motion    There is no rigidity noted    Trachea is in the midline  CV:  Tachycardic rate, regular rhythm      Normal S1/S2, no S3/S4    No murmur or rub  Resp:  Lungs are clear and equal bilaterally    There is no tachypnea    No increased work of breathing    No rales, wheezing, or rhonchi  GI:  Abdomen is soft, no rigidity or guarding    No distension, or mass. LLQ colostomy with stoma pink and well perfused. Liquid brown-yellow stool and gas in ostomy appliance.    Mild diffuse tenderness. No rebound tenderness   MS:  Normal muscular tone    Symmetric motor strength    No lower extremity edema  Skin:  No rash or acute skin lesions noted  Neuro:  Awake and alert    Speech is normal and fluent    Moves all extremities spontaneously  Psych: Normal affect.  Appropriate interactions.      Emergency Department Course   ECG:  ECG  (14:28:21):  Rate 98 bpm. PA interval 138. QRS duration 84. QT/QTc 340/434. P-R-T axes 78 5 92. Normal sinus rhythm. Nonspecific ST and T wave abnormality. Abnormal ECG. Agree with computer interpretation. Interpreted at 1846 by Jeevan Jordan MD.     Imaging:  Radiographic findings were communicated with the patient who voiced understanding of the findings.    CT Abdomen Pelvis w Contrast  1.  Interval sigmoid resection with new descending colostomy. No evidence for abscess or bowel obstruction. There is new very mild portal venous air in the left hepatic lobe with some air seen within mesenteric venous radicles in the left side of the abdomen. This area is likely postsurgical in nature although clinical correlation recommended as this can be a sign of bowel infarct. No pneumatosis or bowel wall thickening. There is soft tissue haziness and stranding within the subcutaneous tissues at the stoma but no discrete fluid collection.  2.  Fatty liver.  3.  Cholecystectomy.  4.  Small cyst right kidney.  As read by Radiology.    Laboratory:  CMP: Glucose 176 (H), carbon dioxide 17 (L), urea 41 (H), creatinine 1.83 (H), GFR 41 (L), protein total 8.9 (H), o/w WNL  CBC: WBC 10.8, HGB 17.3,    Troponin 1 (1447): <0.015  ISTAT Lactate (2002): pH 7.32, pCO2 3 5(L), pO2 26, Bicarbonate 18 (L), O2 sat 44, Lactic Acid 1.9   C diff toxin B PCR: pending     Interventions:  1554: Zofran 4 mg IV  1610: LR 1L IV Bolus  1816: LR 1L IV Bolus   1816: Zofran 4 mg IV  1913: Morphine 4 mg IV  2002: Morphine 4 mg IV  2047: Benadryl 25 mg IV  2220: Benadryl 25 mg IV    Emergency Department Course:  1750: Nursing notes and vitals reviewed. I performed an exam of the patient as documented above.     IV inserted. Medicine administered as documented above. Blood drawn. This was sent to the lab for further testing, results above.    The patient was sent for an abdominal CT while in the emergency department, findings above.     2000: I  rechecked the patient and discussed the results of his workup thus far.     2038: I consulted with Dr. Barrios, Surgery, regarding the patient's history and presentation here in the emergency department.    2110:  I consulted with Dr. Wagner of the hospitalist services. They are in agreement to accept the patient for admission.    Findings and plan explained to the Patient who consents to admission. Discussed the patient with Dr. Wagner, who will admit the patient to an observation bed for further monitoring, evaluation, and treatment.    Impression & Plan      Medical Decision Making:  Jim Mixon is a 54 year old male status post recent laparoscopic assisted sigmoid colectomy with end descending colostomy due to complicated sigmoid diverticulitis with microperforation and abscess presents with several days of increased diarrhea like ostomy output and multiple episodes of nonbloody nonbilious emesis.  No fevers.  On my evaluation the patient is well-appearing initially pain-free with a benign abdominal exam.  The stoma appears normal.  A broad differential diagnosis is considered including gastroenteritis, bowel obstruction, partial bowel obstruction, volvulus, bowel ischemia, dehydration, metabolic or electrolyte disturbance.  During his ED course the patient had paroxysms of severe pain during which time he would become very uncomfortable.  Analgesics IV fluids and antiemetics were provided. Work-up in the emergency department reveals a normal CMP with the exception of acute renal insufficiency likely due to dehydration.  CBC and lactic acid is normal.  Given his recent surgery and his symptoms CT scan of the abdomen and pelvis was obtained which reveals nonspecific portal venous and mesenteric venous gas.  This may be simply postoperative change.  Can also be a sign of bowel ischemia.  This is felt to be less likely given the normal lactate and relatively benign appearance and exam of the patient.   However given the finding I did discuss the case with the patient's surgeon Dr. Barrios who recommended admission to the hospital for ongoing IV fluid hydration monitoring of his abdominal exam and surgical consultation in the morning.  Findings and plan were discussed with the patient who is agreeable.  The case was discussed with the hospitalist to admit the patient to the hospital for further monitoring evaluation and treatment.  Patient was admitted in stable condition.      Diagnosis:    ICD-10-CM    1. Abdominal pain, generalized R10.84    2. Vomiting and diarrhea R11.10     R19.7    3. Dehydration E86.0    4. Acute renal insufficiency N28.9        Disposition:  Admitted to Dr. Kristy HUERTAS, Michelle Paulson, am serving as a scribe at 5:50 PM on 11/6/2019 to document services personally performed by Jeevan Jordan MD based on my observations and the provider's statements to me.       Michelle Paulson  11/6/2019   [facility] EMERGENCY DEPARTMENT       Jeevan Jordan MD  11/07/19 1209

## 2019-11-06 NOTE — TELEPHONE ENCOUNTER
S/p 1.  Laparoscopic-assisted  sigmoid colectomy with end descending colostomy.  9/19/19  2.  Complete  takedown  of  splenic  flexure   3.  Ureteral  stent  placement  by  Urology    Surgeon:  Dr. Barrios    Patient is calling today to report that he has been having loose watery stool output in colostomy bag and vomiting intermittently since Sunday.  He returned from a trip to [facility] yesterday.    He continues with nausea, watery stools and upper abdominal pain that he rates at 9/10. Patient had an episode of dry heaves while speaking to me on the phone.  He states that he has not been able to eat or drink much in the last few days.  States he feels weak.      Discussed with Alisia Flores PA-C.  Patient was instructed to have someone drive him to ER for evaluation.    Patient verbalizes understanding and agrees.  States he will go to [facility] ER.   I will forward info to Dr. Barrios.

## 2019-11-07 VITALS
WEIGHT: 151 LBS | HEART RATE: 92 BPM | TEMPERATURE: 97.7 F | BODY MASS INDEX: 23.7 KG/M2 | RESPIRATION RATE: 18 BRPM | HEIGHT: 67 IN | DIASTOLIC BLOOD PRESSURE: 90 MMHG | SYSTOLIC BLOOD PRESSURE: 142 MMHG | OXYGEN SATURATION: 97 %

## 2019-11-07 LAB
ANION GAP SERPL CALCULATED.3IONS-SCNC: 7 MMOL/L (ref 3–14)
BUN SERPL-MCNC: 32 MG/DL (ref 7–30)
C DIFF TOX B STL QL: NEGATIVE
CALCIUM SERPL-MCNC: 8.8 MG/DL (ref 8.5–10.1)
CHLORIDE SERPL-SCNC: 109 MMOL/L (ref 94–109)
CO2 SERPL-SCNC: 23 MMOL/L (ref 20–32)
CREAT SERPL-MCNC: 1.42 MG/DL (ref 0.66–1.25)
GFR SERPL CREATININE-BSD FRML MDRD: 55 ML/MIN/{1.73_M2}
GLUCOSE SERPL-MCNC: 113 MG/DL (ref 70–99)
INTERPRETATION ECG - MUSE: NORMAL
POTASSIUM SERPL-SCNC: 3.5 MMOL/L (ref 3.4–5.3)
SODIUM SERPL-SCNC: 139 MMOL/L (ref 133–144)
SPECIMEN SOURCE: NORMAL

## 2019-11-07 PROCEDURE — 25800030 ZZH RX IP 258 OP 636: Performed by: INTERNAL MEDICINE

## 2019-11-07 PROCEDURE — 36415 COLL VENOUS BLD VENIPUNCTURE: CPT | Performed by: INTERNAL MEDICINE

## 2019-11-07 PROCEDURE — G0378 HOSPITAL OBSERVATION PER HR: HCPCS

## 2019-11-07 PROCEDURE — 80048 BASIC METABOLIC PNL TOTAL CA: CPT | Performed by: INTERNAL MEDICINE

## 2019-11-07 PROCEDURE — 25000132 ZZH RX MED GY IP 250 OP 250 PS 637: Performed by: INTERNAL MEDICINE

## 2019-11-07 PROCEDURE — 99217 ZZC OBSERVATION CARE DISCHARGE: CPT | Performed by: PHYSICIAN ASSISTANT

## 2019-11-07 PROCEDURE — 99214 OFFICE O/P EST MOD 30 MIN: CPT | Performed by: SURGERY

## 2019-11-07 RX ADMIN — SODIUM CHLORIDE: 9 INJECTION, SOLUTION INTRAVENOUS at 08:32

## 2019-11-07 RX ADMIN — PANTOPRAZOLE SODIUM 20 MG: 20 TABLET, DELAYED RELEASE ORAL at 06:17

## 2019-11-07 NOTE — ED NOTES
Replaced pts colostomy bag and set-up prior to transport up to OBS unit.    Noted pts hives seemed to be worsening and spreading to other arm and other side of chest/abdomen.   MD notified, 25 mg Benadryl IV ordered and given.   RN on OBS notified.

## 2019-11-07 NOTE — CONSULTS
Care Transition Initial Assessment - RN        Met with: Patient and Family.  DATA   Active Problems:    Gastroenteritis    Acute kidney injury (H)    Dehydration       Cognitive Status: awake, alert and oriented.  Primary Care Clinic Name: [facility]     Contact information and PCP information verified: Yes                        Insurance concerns: No Insurance issues identified  ASSESSMENT  Patient currently receives the following services:  None        Identified issues/concerns regarding health management: Patient admitted with nausea/ vomiting/ diarrhea thought to be due to gastroenteritis. RN CTS following d/t Elevated PORSHA. Patient has had 3 other hospitalizations since August 2019 for diverticulitis, abscess, perforation, and a new ostomy. Patient has a history of HTN and anxiety. Patient states that wife assists with ostomy care and management. Patient states that management has been going well and they have a 3 months of supplies. Patient will follow up with primary care or surgery as needed for additional ostomy supplies. Patient has followed up with surgery as recommended after each hospitalization. Patient is establishing care at [facility] for primary care.   No handoff will be given to clinic care coordination as no gap in care identified.   Per documentation, patient has recently traveled to [facility].   Patient lives in [place] with his wife and 2 young children. Patient asked about emergent childcare options when patinet needs to come to ER or be hospitalized. Patient states has limited support, no family in the [place]. Patient told about option for Crisis Nursery if emergent needs arise.   PLAN  Financial costs for the patient were not discussed .  Patient given options and choices for discharge Yes .  Patient/family is agreeable to the plan?  Yes.  Patient anticipates discharging to Home .        Patient anticipates needs for home equipment: No  Transportation/person available to  transport on day of discharge  is wife and have they been notified.  Plan/Disposition: Home   Appointments: Scheduled a post hospitalization appointment at [facility].       Nov 18, 2019 11:10 AM CST  Office visit with Anali Mccabe MD  [facility]3] ([facility]3]) [address] [place] [id number] [phone]       Care  (CTS) will continue to follow as needed.    Anali Stover MA/RN Case Manager  Inpatient Care Coordination  [facility]   [phone]

## 2019-11-07 NOTE — PLAN OF CARE
"Patient's After Visit Summary was reviewed with patient and/or spouse.   Patient verbalized understanding of After Visit Summary, recommended follow up and was given an opportunity to ask questions.   Discharge medications sent home with patient/family: no new medications added    Discharged with spouse/kids and nurse with all belongings. Patient medically cleared to discharge. Patients family will transport home.   BP (!) 142/90 (BP Location: Left arm)   Pulse 92   Temp 97.7  F (36.5  C) (Oral)   Resp 18   Ht 1.702 m (5' 7\")   Wt 68.5 kg (151 lb)   SpO2 97%   BMI 23.65 kg/m    OBSERVATION patient END time: 1300       "

## 2019-11-07 NOTE — DISCHARGE SUMMARY
[facility] Outpatient / Observation Unit  Discharge Summary        Jim Mixon MRN# [id number]   YOB: 1965 Age: 54 year old     Date of Admission:  11/6/2019  Date of Discharge:  11/7/2019  Admitting Physician:  Teddy Wagner MD  Discharge Physician: Silke Simon PA-C  Discharging Service: Hospitalist      Primary Provider: Alex Cherry  Primary Care Physician Phone Number: [phone]         Primary Discharge Diagnoses:    Jim Mixon is a 54-year-old male with history of hypertension, GERD, anxiety, diverticulitis, cholecystitis and s/p sigmoid colectomy on 9/19 due to perforated bowel, who was admitted on 11/6/2019 for concerns of nausea, abdominal pain and increased ostomy output.    1.  Diarrhea with nausea and vomiting in the setting of recent sigmoid colectomy with colostomy placement 6 weeks ago - He did well after surgery and just started having symptoms approximately 4 days ago. This seems most consistent with gastroenteritis or food poisoning. Stool collected for C. Difficile and is negative. Stool output decreased in frequency but still watery. Tolerated regular diet. Dr. Barrios consulted, no further recommendations, unlikely surgical complication.     2.  Acute kidney injury due to dehydration from vomiting and diarrhea - Cr improved with IV hydration, has not returned to normal baseline, anticipate this to continue to improved with improved oral intake at home. Recheck Cr in 1-2 weeks with PCP.     3.  Dehydration        Secondary Discharge Diagnoses:     Past Medical History:   Diagnosis Date     Anxiety      Diverticulitis      GERD (gastroesophageal reflux disease)      Hypertension                 Code Status:      Full Code        Brief Hospital Summary:        Reason for your hospital stay      Diarrhea and abdominal pain. Likely due to a viral process vs food   poisoning. C diff testing is still pending on discharge but very low   suspicion  that this will be positive given lack of fever, white count and   symptoms have significantly improved without specific treatment. General   surgery also saw you here given your recent colon resection and colostomy.               Please refer to initial admission history and physical for further details.   Briefly, Jim Mixon was admitted on 11/6/2019 for concerns of acute nausea, abdominal pain and increased ostomy output. Work up in ED showed evidence of dehydration with elevated Cr. No evidence of blood in stool. CT of the abd/pelvis showed some mild portal venous air in the left hepatic lobe which is likely post surgical in nature, no obvious evidence of perforation or bowel infarct. Pt was registered to the Observation Unit for continued supportive therapy for acute gastroenteritis.     Pt was resuscitated with IV fluids and continued on supportive measures including anti-emetics and pain control as needed. Labs were reviewed and significant results addressed.  On the day of discharge, symptoms were resolving and pt was able to tolerate PO intake. Vitals were stable, without evidence of orthostasis. Medications were reviewed and adjustments made as necessary. Pt is instructed to follow up as below.            Significant Lab During Hospitalization:      Recent Labs   Lab 11/06/19  1447   WBC 10.8   HGB 17.3   HCT 52.3   MCV 88        Recent Labs   Lab 11/07/19  0630 11/06/19  1447    134   POTASSIUM 3.5 3.4   CHLORIDE 109 104   CO2 23 17*   ANIONGAP 7 13   * 176*   BUN 32* 41*   CR 1.42* 1.83*   GFRESTIMATED 55* 41*   GFRESTBLACK 64 47*   JOSEFINA 8.8 9.9   PROTTOTAL  --  8.9*   ALBUMIN  --  4.9   BILITOTAL  --  0.9   ALKPHOS  --  102   AST  --  16   ALT  --  40     Recent Labs   Lab 11/06/19  2002   LACT 1.9     Recent Labs   Lab 11/06/19  1447   TROPI <0.015                Significant Imaging During Hospitalization:      Recent Results (from the past 48 hour(s))   CT Abdomen Pelvis  w Contrast    Narrative    EXAM: CT ABDOMEN PELVIS W CONTRAST  LOCATION: [facility]  DATE/TIME: 11/6/2019 6:27 PM    INDICATION: Abdominal pain status post partial colectomy.  COMPARISON: 09/17/2019.  TECHNIQUE: CT scan of the abdomen and pelvis was performed following injection of IV contrast. Multiplanar reformats were obtained. Dose reduction techniques were used.  CONTRAST: 75 mL Isovue-370.    FINDINGS:   LOWER CHEST: Normal.    HEPATOBILIARY: Cholecystectomy. Fatty liver. There is a small amount of portal venous air now seen in the left hepatic lobe. This most likely is postsurgical although clinical correlation recommended as it can be a finding of bowel ischemia. In addition,   there is some air within venous radicles within the left upper abdomen.    PANCREAS: Normal.    SPLEEN: Normal.    ADRENAL GLANDS: Normal.    KIDNEYS/BLADDER: Small cyst right kidney.    BOWEL: Interval sigmoid resection with new descending colostomy. No abscess.    LYMPH NODES: Normal.    VASCULATURE: Unremarkable.    PELVIC ORGANS: Normal.    OTHER: None.    MUSCULOSKELETAL: Normal.      Impression    IMPRESSION:   1.  Interval sigmoid resection with new descending colostomy. No evidence for abscess or bowel obstruction. There is new very mild portal venous air in the left hepatic lobe with some air seen within mesenteric venous radicles in the left side of the   abdomen. This area is likely postsurgical in nature although clinical correlation recommended as this can be a sign of bowel infarct. No pneumatosis or bowel wall thickening. There is soft tissue haziness and stranding within the subcutaneous tissues at   the stoma but no discrete fluid collection.    2.  Fatty liver.    3.  Cholecystectomy.    4.  Small cyst right kidney.                  Pending Results:        Unresulted Labs Ordered in the Past 30 Days of this Admission     Date and Time Order Name Status Description    11/6/2019 2233 Clostridium difficile  "toxin B PCR In process               Consultations This Hospital Stay:      Consultation during this admission received from surgery         Discharge Instructions and Follow-Up:      Follow-up Appointments     Follow-up and recommended labs and tests       Follow up with primary care provider, Alex Cherry, within 7-14   days for hospital follow- up.  The following labs/tests are recommended:   BMP.           Pt instructed to follow up with PCP in 7 days  Follow-up Labs BMP        Discharge Disposition:      Discharged to home         Discharge Medications:        Current Discharge Medication List      CONTINUE these medications which have NOT CHANGED    Details   esomeprazole (NEXIUM) 20 MG DR capsule Take 20 mg by mouth every morning (before breakfast) Take 30-60 minutes before eating.      LORazepam (ATIVAN) 1 MG tablet Take 1 mg by mouth every 6 hours as needed for anxiety      oxyCODONE (ROXICODONE) 5 MG tablet Take 1-2 tablets (5-10 mg) by mouth every 6 hours as needed for moderate to severe pain  Qty: 30 tablet, Refills: 0    Associated Diagnoses: S/P partial resection of colon; Diverticulitis of large intestine with perforation without bleeding      acetaminophen (TYLENOL) 500 MG tablet Take 1,000 mg by mouth every 6 hours as needed for mild pain                 Allergies:         Allergies   Allergen Reactions     Ciprofloxacin            Condition and Physical on Discharge:      Discharge condition: Stable   Vitals: Blood pressure 134/85, pulse 95, temperature 98  F (36.7  C), temperature source Oral, resp. rate 16, height 1.702 m (5' 7\"), weight 68.5 kg (151 lb), SpO2 95 %.  151 lbs 0 oz      GENERAL:  Comfortable.  PSYCH: pleasant, oriented, No acute distress.  HEART:  RRR.  LUNGS:  Normal Respiratory effort.    EXTREMITIES:  Able to ambulate independently  SKIN:  Dry to touch, No rash, wound or ulcerations.  NEUROLOGIC:  Grossly intact    Silke Simon PA-C   "

## 2019-11-07 NOTE — CONSULTS
General Surgery Consultation    Jim Mixon MRN# [id number]   Age: 54 year old YOB: 1965     Date of Admission:  11/6/2019    Reason for consult:            Abdominal pain       Requesting physician:            Dr Teddy Wagner                Assessment and Plan:   Assessment:   Jim Mixon is a 54 year old male recent sigmoidectomy with end colostomy for diverticulitis that failed medical management, now admitted with nausea, vomiting and diarrhea and GRICELDA. Finding of small amounts of portal venous gas on abdominal CT which can be seen with mesenteric ischemia. Luckily the patient has no other signs of mesenteric ischemia, pain is resolved, vitals and labs are normal. Overall presentation is consistent with gastroenteritis and he is improved this morning.      Plan:   Advance diet as tolerated  Ok to discharge home if cleared by hospitalist  Will follow up with me in clinic at previously scheduled appointment  He will reschedule his PCP appointment as well               Chief Complaint:   Vomiting, diarrhea, abd pain    History is obtained from the patient         History of Present Illness:   Jim Mixon is a 54 year old [place] male who is well known to me after diverticulitis with abscess s/p sigmoid colectomy with end ostomy in mid September. Recovered well from that surgery. Presented to the hospital last night with nausea, vomiting, diarrhea and abdominal pain. Pt states he was in [place] and after eating a lot of Chorizo, salami, processed meat he started having nausea, vomiting and loose stool from the ostomy about 5 days ago, Nov 2nd. States since his gallbladder was removed he really can't eat those types of foods without getting sick. Was also having some abdominal pain, epigastric, back and chest. Was better on Nov 5th and they flew here from [place] (with his wife and 2 children). Pain and nausea were worse yesterday with continued diarrhea and he presented  to the ED yesterday afternoon/evening. Cr was elevated but other labs including LFTs, lactate and CBC were normal. CT scan was obtained which showed a small amount of portal venous gas without much other findings. He remained hemodynamically normal. He was admitted to the floor. He has tolerated clears overnight and states the pain is gone this morning. Hopeful to discharge.            Past Medical History:    has a past medical history of Anxiety, Diverticulitis, GERD (gastroesophageal reflux disease), and Hypertension.          Past Surgical History:     Past Surgical History:   Procedure Laterality Date     CHOLECYSTECTOMY  01/2004     INSERT STENT URETER Bilateral 9/19/2019    Procedure: PREOPERATIVE BILATERAL URETERAL STENT PLACEMENT;  Surgeon: Toni Liao MD;  Location: [facility] OR     LAPAROSCOPIC ASSISTED SIGMOID COLECTOMY N/A 9/19/2019    Procedure: LAPAROSCOPY-ASSISTED, SIGMOIDCOLECTOMY WITH END COLOSTOMY;  Surgeon: Jaimee Barrios MD;  Location:  OR             Social History:     Social History     Tobacco Use     Smoking status: Never Smoker     Smokeless tobacco: Never Used   Substance Use Topics     Alcohol use: Not Currently             Family History:     Family History   Problem Relation Age of Onset     Diabetes Father      Diabetes Maternal Grandmother             Allergies:     Allergies   Allergen Reactions     Ciprofloxacin              Medications:   No current facility-administered medications on file prior to encounter.   esomeprazole (NEXIUM) 20 MG DR capsule, Take 20 mg by mouth every morning (before breakfast) Take 30-60 minutes before eating.  LORazepam (ATIVAN) 1 MG tablet, Take 1 mg by mouth every 6 hours as needed for anxiety  oxyCODONE (ROXICODONE) 5 MG tablet, Take 1-2 tablets (5-10 mg) by mouth every 6 hours as needed for moderate to severe pain  acetaminophen (TYLENOL) 500 MG tablet, Take 1,000 mg by mouth every 6 hours as needed for mild pain        diphenhydrAMINE  25 mg  "Oral Once     pantoprazole  20 mg Oral QAM AC            Review of Systems:   The 10 point review of systems is negative other than noted in the HPI.          Physical Exam:   /85 (BP Location: Left arm)   Pulse 95   Temp 98  F (36.7  C) (Oral)   Resp 16   Ht 1.702 m (5' 7\")   Wt 68.5 kg (151 lb)   SpO2 95%   BMI 23.65 kg/m    General - Well developed, well nourished male in no apparent distress  Eyes:  no scleral icterus or redness  Lungs: no dyspnea  Heart: regular pulse rate  Abdomen:soft, flat, non-distended with no tenderness noted diffusely.  No rebound or guarding.  no masses palpated, ostomy in LLQ patent with loose brown-green stool in the bag.  MSK: Extremities warm without edema  Neurologic: nonfocal  Psychiatric: Mood and affect appropriate  Skin: Without lesions, rashes, or juandice         Data:   Labs reviewed    Imaging:  All imaging studies reviewed by me and my interpretation of the CT is: ostomy in LLQ. Mildly dilated small bowel loops. Small amounts of portal venous gas and gas in the LUQ veins. No free fluid, no bowel wall thickening      Jaimee Barrios MD  11/7/2019 9:36 AM     Time spent with the patient, reviewing the EMR, reviewing laboratory and imaging studies, more than 50% of which was counseling and coordinating care:  30 minutes.           "

## 2019-11-07 NOTE — H&P
[facility]  History and Physical   Hospitalist Service    Teddy Wagner MD    Jim Mixon MRN# [id number]   YOB: 1965 Age: 54 year old      Date of Admission:  11/6/2019           Assessment and Plan:   Jim Mixon is a 54-year-old male with history of hypertension, GERD, anxiety, diverticulitis, and cholecystitis.  He had sigmoid colectomy with colostomy on 9/19/2019 for perforated bowel.  Surgery was performed by Dr. Barrios.  He did well after surgery.  He completed his antibiotics approximately 5 weeks ago.  He was doing well enough that he went to [facility].  While in [place] he developed left-sided abdominal pain approximately 4 days ago.  He had increased ostomy output and several episodes of nausea with emesis.  His symptoms persisted for 2 days.  They subsided yesterday so he was able to fly back home from [facility].  Symptoms recurred today.  He came to the emergency department for ongoing high ostomy output (he had to change his ostomy bag 5 times today) and persistent nausea with dry heaves.  He has had some abdominal pain.  He has not had fever.  He has not been around people with similar illness.  He is suspicious that the symptoms may have been brought on by some chorizo that he had in [place]- evidently, he has had problems tolerating this since his cholecystectomy.  Emergency department evaluation showed tachycardia with heart rate of 114 but otherwise unremarkable vital signs.  CBC was unremarkable.  Basic metabolic panel showed BUN of 41 and creatinine of 1.83.  Lactic acid was normal at 1.9.  Liver function tests were unremarkable.  CT of abdomen and pelvis was obtained and showed changes associated with recent bowel resection and colostomy.  There was some mild portal venous air in the left hepatic lobe that was thought to likely be related to recent surgery.  There were no obvious abscesses.  I was asked to admit this patient to observation for IV  fluid hydration and monitoring of renal function.    Problem list:    1.  Diarrhea with nausea and vomiting in the setting of recent sigmoid colectomy with colostomy placement 6 weeks ago.  He did well after surgery and just started having symptoms approximately 4 days ago.  This seems most consistent with gastroenteritis or food poisoning.  I will evaluate for C. difficile.  Consider also functional colostomy high-output.  Admit to observation.  Hydrate overnight.  Advance diet as tolerated.  Antiemetics will be given as needed.  Analgesics will be given as needed.  I will ask Dr. Barrios to see patient tomorrow.    2.  Acute kidney injury due to dehydration from vomiting and diarrhea.  Hydrate with normal saline.  Repeat basic metabolic panel in the morning.    3.  Dehydration.    4.  Stable medical conditions include hypertension, GERD, and anxiety.    Full code  Ambulate for DVT prophylaxis  Disposition: Admit to observation           Code Status:   Full Code         Primary Care Physician:   Alex Cherry [phone]         Chief Complaint:   Abdominal pain, nausea, vomiting, and diarrhea    History is obtained from patient, Dr. Jordan, and the medical record.         History of Present Illness:   Jim Mixon is a 54-year-old male with history of hypertension, GERD, anxiety, diverticulitis, and cholecystitis.  He had sigmoid colectomy with colostomy on 9/19/2019 for perforated bowel.  Surgery was performed by Dr. Barrios.  He did well after surgery.  He completed his antibiotics approximately 5 weeks ago.  He was doing well enough that he went to [facility].  While in [place] he developed left-sided abdominal pain approximately 4 days ago.  He had increased ostomy output and several episodes of nausea with emesis.  His symptoms persisted for 2 days.  They subsided yesterday so he was able to fly back home from [facility].  Symptoms recurred today.  He came to the emergency department for ongoing high  ostomy output (he had to change his ostomy bag 5 times today) and persistent nausea with dry heaves.  He has had some abdominal pain.  He has not had fever.  He has not been around people with similar illness.  He is suspicious that the symptoms may have been brought on by some chorizo that he had in [place]- evidently, he has had problems tolerating this since his cholecystectomy.  Emergency department evaluation showed tachycardia with heart rate of 114 but otherwise unremarkable vital signs.  CBC was unremarkable.  Basic metabolic panel showed BUN of 41 and creatinine of 1.83.  Lactic acid was normal at 1.9.  Liver function tests were unremarkable.  CT of abdomen and pelvis was obtained and showed changes associated with recent bowel resection and colostomy.  There was some mild portal venous air in the left hepatic lobe that was thought to likely be related to recent surgery.  There were no obvious abscesses.  I was asked to admit this patient to observation for IV fluid hydration and monitoring of renal function.             Past Medical History:     Patient Active Problem List   Diagnosis     Diverticulitis     Yeast infection     Escherichia coli (E. coli) infection     Intra-abdominal abscess (H)     Diverticulitis of large intestine with perforation     Diverticulitis large intestine     Gastroenteritis     Acute kidney injury (H)     Dehydration      Past Medical History:   Diagnosis Date     Anxiety      Diverticulitis      GERD (gastroesophageal reflux disease)      Hypertension              Past Surgical History:     Past Surgical History:   Procedure Laterality Date     CHOLECYSTECTOMY  01/2004     INSERT STENT URETER Bilateral 9/19/2019    Procedure: PREOPERATIVE BILATERAL URETERAL STENT PLACEMENT;  Surgeon: Toni Liao MD;  Location: [facility] OR     LAPAROSCOPIC ASSISTED SIGMOID COLECTOMY N/A 9/19/2019    Procedure: LAPAROSCOPY-ASSISTED, SIGMOIDCOLECTOMY WITH END COLOSTOMY;  Surgeon: Jaimee Barrios,  MD;  Location:  OR            Home Medications:     Prior to Admission medications    Medication Sig Last Dose Taking? Auth Provider   esomeprazole (NEXIUM) 20 MG DR capsule Take 20 mg by mouth every morning (before breakfast) Take 30-60 minutes before eating. 11/6/2019 at Unknown time Yes Unknown, Entered By History   LORazepam (ATIVAN) 1 MG tablet Take 1 mg by mouth every 6 hours as needed for anxiety 11/5/2019 at Unknown time Yes Unknown, Entered By History   oxyCODONE (ROXICODONE) 5 MG tablet Take 1-2 tablets (5-10 mg) by mouth every 6 hours as needed for moderate to severe pain 11/5/2019 at Unknown time Yes Atul Merchant PA-C   acetaminophen (TYLENOL) 500 MG tablet Take 1,000 mg by mouth every 6 hours as needed for mild pain  at prn  Unknown, Entered By History            Allergies:     Allergies   Allergen Reactions     Ciprofloxacin             Social History:     Social History     Tobacco Use     Smoking status: Never Smoker     Smokeless tobacco: Never Used   Substance Use Topics     Alcohol use: Not Currently             Family History:     Family History   Problem Relation Age of Onset     Diabetes Father      Diabetes Maternal Grandmother               Review of Systems:   The 10 point Review of Systems is negative other than as noted in the HPI.           Physical Exam:   Blood pressure (!) 162/99, pulse 95, temperature 97.6  F (36.4  C), resp. rate 22, weight 68 kg (150 lb), SpO2 97 %.  150 lbs 0 oz      GENERAL: Pleasant and cooperative. No acute distress.  EYES: Pupils equal and round. No scleral erythema or icterus.  ENT: External ears are normal without deformity. Posterior oropharynx is without erythem, swelling, or exudate.  NECK: Supple. No masses or swelling. No tenderness. Thyroid is normal without mass or tenderness.  CHEST: Clear to auscultation. Normal breath sounds. No retractions.   CV: Regular rate and rhythm. No JVD. Pulses normal.  ABDOMEN: Bowel sounds present. Mild  generalized tenderness. No masses or hernia. Left lower abdominal quadrant ostomy  EXTREMETIES: No clubbing, cyanosis, or ischemia.  SKIN: Warm and dry to touch. No wounds or rashes.  NEUROLOGIC: Strength and sensation are normal. Deep tendon reflexes are normal. Cranial nerves are normal.             Data:   All new lab and imaging data was reviewed.     Results for orders placed or performed during the hospital encounter of 11/06/19 (from the past 24 hour(s))   EKG 12-lead, tracing only   Result Value Ref Range    Interpretation ECG Click View Image link to view waveform and result    CBC with platelets differential   Result Value Ref Range    WBC 10.8 4.0 - 11.0 10e9/L    RBC Count 5.96 (H) 4.4 - 5.9 10e12/L    Hemoglobin 17.3 13.3 - 17.7 g/dL    Hematocrit 52.3 40.0 - 53.0 %    MCV 88 78 - 100 fl    MCH 29.0 26.5 - 33.0 pg    MCHC 33.1 31.5 - 36.5 g/dL    RDW 13.2 10.0 - 15.0 %    Platelet Count 450 150 - 450 10e9/L    Diff Method Automated Method     % Neutrophils 65.8 %    % Lymphocytes 22.7 %    % Monocytes 8.3 %    % Eosinophils 2.3 %    % Basophils 0.3 %    % Immature Granulocytes 0.6 %    Nucleated RBCs 0 0 /100    Absolute Neutrophil 7.1 1.6 - 8.3 10e9/L    Absolute Lymphocytes 2.5 0.8 - 5.3 10e9/L    Absolute Monocytes 0.9 0.0 - 1.3 10e9/L    Absolute Eosinophils 0.3 0.0 - 0.7 10e9/L    Absolute Basophils 0.0 0.0 - 0.2 10e9/L    Abs Immature Granulocytes 0.1 0 - 0.4 10e9/L    Absolute Nucleated RBC 0.0    Comprehensive metabolic panel   Result Value Ref Range    Sodium 134 133 - 144 mmol/L    Potassium 3.4 3.4 - 5.3 mmol/L    Chloride 104 94 - 109 mmol/L    Carbon Dioxide 17 (L) 20 - 32 mmol/L    Anion Gap 13 3 - 14 mmol/L    Glucose 176 (H) 70 - 99 mg/dL    Urea Nitrogen 41 (H) 7 - 30 mg/dL    Creatinine 1.83 (H) 0.66 - 1.25 mg/dL    GFR Estimate 41 (L) >60 mL/min/[1.73_m2]    GFR Estimate If Black 47 (L) >60 mL/min/[1.73_m2]    Calcium 9.9 8.5 - 10.1 mg/dL    Bilirubin Total 0.9 0.2 - 1.3 mg/dL     Albumin 4.9 3.4 - 5.0 g/dL    Protein Total 8.9 (H) 6.8 - 8.8 g/dL    Alkaline Phosphatase 102 40 - 150 U/L    ALT 40 0 - 70 U/L    AST 16 0 - 45 U/L   Troponin I   Result Value Ref Range    Troponin I ES <0.015 0.000 - 0.045 ug/L   CT Abdomen Pelvis w Contrast    Narrative    EXAM: CT ABDOMEN PELVIS W CONTRAST  LOCATION: [facility]  DATE/TIME: 11/6/2019 6:27 PM    INDICATION: Abdominal pain status post partial colectomy.  COMPARISON: 09/17/2019.  TECHNIQUE: CT scan of the abdomen and pelvis was performed following injection of IV contrast. Multiplanar reformats were obtained. Dose reduction techniques were used.  CONTRAST: 75 mL Isovue-370.    FINDINGS:   LOWER CHEST: Normal.    HEPATOBILIARY: Cholecystectomy. Fatty liver. There is a small amount of portal venous air now seen in the left hepatic lobe. This most likely is postsurgical although clinical correlation recommended as it can be a finding of bowel ischemia. In addition,   there is some air within venous radicles within the left upper abdomen.    PANCREAS: Normal.    SPLEEN: Normal.    ADRENAL GLANDS: Normal.    KIDNEYS/BLADDER: Small cyst right kidney.    BOWEL: Interval sigmoid resection with new descending colostomy. No abscess.    LYMPH NODES: Normal.    VASCULATURE: Unremarkable.    PELVIC ORGANS: Normal.    OTHER: None.    MUSCULOSKELETAL: Normal.      Impression    IMPRESSION:   1.  Interval sigmoid resection with new descending colostomy. No evidence for abscess or bowel obstruction. There is new very mild portal venous air in the left hepatic lobe with some air seen within mesenteric venous radicles in the left side of the   abdomen. This area is likely postsurgical in nature although clinical correlation recommended as this can be a sign of bowel infarct. No pneumatosis or bowel wall thickening. There is soft tissue haziness and stranding within the subcutaneous tissues at   the stoma but no discrete fluid collection.    2.  Fatty  liver.    3.  Cholecystectomy.    4.  Small cyst right kidney.     ISTAT gases lactate naina POCT   Result Value Ref Range    Ph Venous 7.32 7.32 - 7.43 pH    PCO2 Venous 35 (L) 40 - 50 mm Hg    PO2 Venous 26 25 - 47 mm Hg    Bicarbonate Venous 18 (L) 21 - 28 mmol/L    O2 Sat Venous 44 %    Lactic Acid 1.9 0.7 - 2.1 mmol/L

## 2019-11-07 NOTE — PLAN OF CARE
PRIMARY DIAGNOSIS: ACUTE PAIN  OUTPATIENT/OBSERVATION GOALS TO BE MET BEFORE DISCHARGE:  1. Pain Status: Improved-controlled with oral pain medications.    2. Return to near baseline physical activity: Yes    3. Cleared for discharge by consultants (if involved): No    Discharge Planner Nurse   Safe discharge environment identified: Yes  Barriers to discharge: Yes       Entered by: Laya Vaughn 11/06/2019 11:18 PM     Please review provider order for any additional goals.   Nurse to notify provider when observation goals have been met and patient is ready for discharge.    A&Ox4, bilingual. PIV infusing. SBA. BP elevated, RA. Pain controlled. PRN ativan given for sleep. Tolerating clears. +BS, ostomy CDI. Hives across chest, BUE, abd, and groin. Will continue to monitor.

## 2019-11-07 NOTE — ED NOTES
[facility]  ED Nurse Handoff Report    Jim Mixon is a 54 year old male   ED Chief complaint: Chest Pain; Back Pain; and Abdominal Pain  . ED Diagnosis:   Final diagnoses:   Abdominal pain, generalized   Vomiting and diarrhea   Dehydration   Acute renal insufficiency     Allergies:   Allergies   Allergen Reactions     Ciprofloxacin        Code Status: Full Code  Activity level - Baseline/Home:  Independent. Activity Level - Current:   Independent. Lift room needed: No. Bariatric: No   Needed: No   Isolation: No. Infection: Not Applicable.     Vital Signs:   Vitals:    11/06/19 1540 11/06/19 1700 11/06/19 1800 11/06/19 2000   BP:  (!) 134/93 (!) 146/95 (!) 162/99   Pulse: 98 89 97 95   Resp:       Temp:       SpO2: 98% 96% 99% 100%   Weight:           Cardiac Rhythm:  ,      Pain level: 0-10 Pain Scale: 10  Patient confused: No. Patient Falls Risk: No.   Elimination Status: Has voided   Patient Report - Initial Complaint: Chest, back, abdominal pain since Sunday. Began with vomiting and watery diarrhea. Pt has ostomy that was placed 6 weeks ago and has had to drain it 5 times today. Better yesterday, so he flew home from [facility]. Got worse again this morning. Denies blood in emesis. .   Focused Assessment: Abdominal pain, vomiting, diarrhea.   Tests Performed: Labs, EKG, CT.  . Abnormal Results:   Labs Ordered and Resulted from Time of ED Arrival Up to the Time of Departure from the ED   CBC WITH PLATELETS DIFFERENTIAL - Abnormal; Notable for the following components:       Result Value    RBC Count 5.96 (*)     All other components within normal limits   COMPREHENSIVE METABOLIC PANEL - Abnormal; Notable for the following components:    Carbon Dioxide 17 (*)     Glucose 176 (*)     Urea Nitrogen 41 (*)     Creatinine 1.83 (*)     GFR Estimate 41 (*)     GFR Estimate If Black 47 (*)     Protein Total 8.9 (*)     All other components within normal limits   ISTAT  GASES LACTATE CODI  POCT - Abnormal; Notable for the following components:    PCO2 Venous 35 (*)     Bicarbonate Venous 18 (*)     All other components within normal limits   TROPONIN I   PERIPHERAL IV CATHETER   FREE WATER   ISTAT CG4 GASES LACTATE CODI NURSING POCT     CT Abdomen Pelvis w Contrast   Final Result   IMPRESSION:    1.  Interval sigmoid resection with new descending colostomy. No evidence for abscess or bowel obstruction. There is new very mild portal venous air in the left hepatic lobe with some air seen within mesenteric venous radicles in the left side of the    abdomen. This area is likely postsurgical in nature although clinical correlation recommended as this can be a sign of bowel infarct. No pneumatosis or bowel wall thickening. There is soft tissue haziness and stranding within the subcutaneous tissues at    the stoma but no discrete fluid collection.      2.  Fatty liver.      3.  Cholecystectomy.      4.  Small cyst right kidney.           .   Treatments provided: IV fluids, Morphine IV (possible allergic reaction- hives), Zofran, Benadryl.  Family Comments: No family at bedside.  OBS brochure/video discussed/provided to patient:  N/A  ED Medications:   Medications   ondansetron (ZOFRAN) injection 4 mg (4 mg Intravenous Given 11/6/19 1816)   ondansetron (ZOFRAN) injection 4 mg (4 mg Intravenous Given 11/6/19 1554)   lactated ringers BOLUS 1,000 mL (0 mLs Intravenous Stopped 11/6/19 1815)   lactated ringers BOLUS 1,000 mL (1,000 mLs Intravenous New Bag 11/6/19 1816)   morphine (PF) injection 4 mg (4 mg Intravenous Given 11/6/19 2002)   CT Scan Flush (59 mLs Intravenous Given 11/6/19 1830)   iopamidol (ISOVUE-370) solution 500 mL (75 mLs Intravenous Given 11/6/19 1830)   diphenhydrAMINE (BENADRYL) injection 25 mg (25 mg Intravenous Given 11/6/19 2047)     Drips infusing:  No  For the majority of the shift, the patient's behavior Green. Interventions performed were N/A.     Severe Sepsis OR Septic Shock Diagnosis  Present: No      ED Nurse Name/Phone Number: Lizzytrina Masters RN,   8:54 PM    RECEIVING UNIT ED HANDOFF REVIEW    Above ED Nurse Handoff Report was reviewed: Yes  Reviewed by: Jaimee Smith RN on November 6, 2019 at 9:54 PM

## 2019-11-07 NOTE — ED NOTES
Called [facility] for a second time (previous RN called as well) to request a COLOPAST colostomy set up for the pt as his adhesive ring is coming unattached and leaking stool. [facility] will continue to look for this colostomy set up and send it to [facility]

## 2019-11-07 NOTE — PLAN OF CARE
PRIMARY DIAGNOSIS: ACUTE PAIN  OUTPATIENT/OBSERVATION GOALS TO BE MET BEFORE DISCHARGE:  1. Pain Status: Pain free.    2. Return to near baseline physical activity: Yes    3. Cleared for discharge by consultants (if involved): No    Discharge Planner Nurse   Safe discharge environment identified: Yes  Barriers to discharge: Yes       Entered by: Laya Vaughn 11/07/2019 3:52 AM     Please review provider order for any additional goals.   Nurse to notify provider when observation goals have been met and patient is ready for discharge.    PIV infusing. AVSS, RA. Tolerating clears. Stool sample sent. Hives diminished. Will continue to monitor.

## 2019-11-07 NOTE — PHARMACY-ADMISSION MEDICATION HISTORY
Admission medication history interview status for this patient is complete. See [facility] admission navigator for allergy information, prior to admission medications and immunization status.     Medication history interview source(s):Patient  Medication history resources (including written lists, pill bottles, clinic record):None  [facility]    Changes made to PTA medication list:  Added: none  Deleted: Zofran, Florastor, Trazodone, Zolpidem   Changed: none    Actions taken by pharmacist (provider contacted, etc):None     Additional medication history information:None    Medication reconciliation/reorder completed by provider prior to medication history?  Yes     Do you take OTC medications (eg tylenol, ibuprofen, fish oil, eye/ear drops, etc)? Yes     For patients on insulin therapy: No      Prior to Admission medications    Medication Sig Last Dose Taking? Auth Provider   esomeprazole (NEXIUM) 20 MG DR capsule Take 20 mg by mouth every morning (before breakfast) Take 30-60 minutes before eating. 11/6/2019 at Unknown time Yes Unknown, Entered By History   LORazepam (ATIVAN) 1 MG tablet Take 1 mg by mouth every 6 hours as needed for anxiety 11/5/2019 at Unknown time Yes Unknown, Entered By History   oxyCODONE (ROXICODONE) 5 MG tablet Take 1-2 tablets (5-10 mg) by mouth every 6 hours as needed for moderate to severe pain 11/5/2019 at Unknown time Yes Atul Merchant PA-C   acetaminophen (TYLENOL) 500 MG tablet Take 1,000 mg by mouth every 6 hours as needed for mild pain  at prn  Unknown, Entered By History

## 2019-11-07 NOTE — ED NOTES
20:45 - Pt itching and noticed hives on right arm, under right armpit, both shoulders and down right side of chest/abdomen. No other symptoms.     Benadryl 25 mg ordered and given IV.

## 2019-11-07 NOTE — PROGRESS NOTES
"PRIMARY DIAGNOSIS: GASTROENTERITIS    OUTPATIENT/OBSERVATION GOALS TO BE MET BEFORE DISCHARGE  1. Orthostatic performed: No                    2. Tolerating PO fluid and/or antibiotics (if applicable): Yes    3. Nausea/Vomiting/Diarrhea symptoms improved: Yes    4. Pain status: Pain free.    5. Return to near baseline physical activity: Yes    Discharge Planner Nurse   Safe discharge environment identified: Yes  Barriers to discharge: Yes       Entered by: Mame Koroma 11/07/2019 9:42 AM     Please review provider order for any additional goals.   Nurse to notify provider when observation goals have been met and patient is ready for discharge.    Patient is alert and oriented x4. VS WNL and documented on the FS. Lung sounds clear in all lobes and patient is on RA. Denies SOB. Active bowel sounds. Colostomy is patent with adequate output (220 ml's) this morning. Stoma is red and budded. Patient manages all his own colostomy needs. Denies any pian, urgency, and frequency when voiding. Denies pain. IV fluids infusing. Patient independent in room. Tolerating diet. Creatinine levels have improved. Plan: Possible discharge later today.     /85 (BP Location: Left arm)   Pulse 95   Temp 98  F (36.7  C) (Oral)   Resp 16   Ht 1.702 m (5' 7\")   Wt 68.5 kg (151 lb)   SpO2 95%   BMI 23.65 kg/m      "

## 2019-11-07 NOTE — PLAN OF CARE
ROOM # 232    Living Situation (if not independent, order SW consult): Home w/family  Facility name: n/a  : Wife, Marietta [phone]    Activity level at baseline: Indep  Activity level on admit: SBA      Patient registered to observation; given Patient Bill of Rights; given the opportunity to ask questions about observation status and their plan of care.  Patient has been oriented to the observation room, bathroom and call light is in place.    Discussed discharge goals and expectations with patient/family.

## 2019-11-13 ENCOUNTER — TELEPHONE (OUTPATIENT)
Dept: SURGERY | Facility: CLINIC | Age: 54
End: 2019-11-13

## 2019-11-13 ENCOUNTER — PREP FOR PROCEDURE (OUTPATIENT)
Dept: SURGERY | Facility: CLINIC | Age: 54
End: 2019-11-13

## 2019-11-13 DIAGNOSIS — Z93.3 COLOSTOMY STATUS (H): Primary | ICD-10-CM

## 2019-11-13 NOTE — TELEPHONE ENCOUNTER
Type of surgery: LAPAROSCOPIC ASSISTED COLOSTOMY TAKEDOWN   Location of surgery: [facility]  Date and time of surgery: 12/10/2019 @ 12:30 PM   Surgeon: Jaimee Barrios MD    Pre-Op Appt Date: PATIENT TO SCHEDULE    Post-Op Appt Date: PATIENT TO SCHEDULE     Packet sent out: Yes  Pre-cert/Authorization completed:  Not Applicable  Date: 11/13/2019 updated 12/4/2019 nms      bilateral ureteral stent placement Dr Liao at 12:30 LAPAROSCOPIC ASSISTED COLOSTOMY TAKEDOWN    GENERAL PT INST TO HAVE H&P WITH  MIN REQ PA ASSIST JLS NMS

## 2019-11-18 ENCOUNTER — OFFICE VISIT (OUTPATIENT)
Dept: PEDIATRICS | Facility: CLINIC | Age: 54
End: 2019-11-18
Payer: COMMERCIAL

## 2019-11-18 VITALS
SYSTOLIC BLOOD PRESSURE: 134 MMHG | HEART RATE: 77 BPM | OXYGEN SATURATION: 97 % | RESPIRATION RATE: 14 BRPM | WEIGHT: 166 LBS | DIASTOLIC BLOOD PRESSURE: 84 MMHG | BODY MASS INDEX: 26 KG/M2 | TEMPERATURE: 97.9 F

## 2019-11-18 DIAGNOSIS — E86.0 DEHYDRATION: ICD-10-CM

## 2019-11-18 DIAGNOSIS — Z23 NEED FOR PROPHYLACTIC VACCINATION AND INOCULATION AGAINST INFLUENZA: ICD-10-CM

## 2019-11-18 DIAGNOSIS — N17.9 ACUTE KIDNEY INJURY (H): Primary | ICD-10-CM

## 2019-11-18 DIAGNOSIS — K52.9 GASTROENTERITIS: ICD-10-CM

## 2019-11-18 DIAGNOSIS — Z93.3 COLOSTOMY STATUS (H): ICD-10-CM

## 2019-11-18 DIAGNOSIS — G25.81 RESTLESS LEG SYNDROME: ICD-10-CM

## 2019-11-18 PROCEDURE — 80048 BASIC METABOLIC PNL TOTAL CA: CPT | Performed by: INTERNAL MEDICINE

## 2019-11-18 PROCEDURE — 82728 ASSAY OF FERRITIN: CPT | Performed by: INTERNAL MEDICINE

## 2019-11-18 PROCEDURE — 90682 RIV4 VACC RECOMBINANT DNA IM: CPT | Performed by: INTERNAL MEDICINE

## 2019-11-18 PROCEDURE — 99214 OFFICE O/P EST MOD 30 MIN: CPT | Mod: 25 | Performed by: INTERNAL MEDICINE

## 2019-11-18 PROCEDURE — 90471 IMMUNIZATION ADMIN: CPT | Performed by: INTERNAL MEDICINE

## 2019-11-18 PROCEDURE — 36415 COLL VENOUS BLD VENIPUNCTURE: CPT | Performed by: INTERNAL MEDICINE

## 2019-11-18 RX ORDER — PRAMIPEXOLE DIHYDROCHLORIDE 0.12 MG/1
TABLET ORAL
Qty: 90 TABLET | Refills: 3 | Status: SHIPPED | OUTPATIENT
Start: 2019-11-18 | End: 2019-12-02

## 2019-11-18 ASSESSMENT — PATIENT HEALTH QUESTIONNAIRE - PHQ9
SUM OF ALL RESPONSES TO PHQ QUESTIONS 1-9: 7
SUM OF ALL RESPONSES TO PHQ QUESTIONS 1-9: 7
10. IF YOU CHECKED OFF ANY PROBLEMS, HOW DIFFICULT HAVE THESE PROBLEMS MADE IT FOR YOU TO DO YOUR WORK, TAKE CARE OF THINGS AT HOME, OR GET ALONG WITH OTHER PEOPLE: SOMEWHAT DIFFICULT

## 2019-11-18 NOTE — PATIENT INSTRUCTIONS
Labs today to recheck kidneys.     Follow-up with surgeon as scheduled. Let us know if you need a pre-operative exam prior to surgery.     For restless legs:  -- lab work (iron study) today  -- try pramipexole 0.125mg before bed. Can increase every 3 days by 1 tab to max of 4 tabs at night (0.5mg). We can refill at a higher dose if needed in the future.

## 2019-11-18 NOTE — PROGRESS NOTES
Subjective  Answers for HPI/ROS submitted by the patient on 11/18/2019   Chronic problems general questions HPI Form  If you checked off any problems, how difficult have these problems made it for you to do your work, take care of things at home, or get along with other people?: Somewhat difficult  PHQ9 TOTAL SCORE: 7      Jim Mixon is a 54 year old male who presents to clinic today for the following health issues:    History of Present Illness        He eats 0-1 servings of fruits and vegetables daily.He consumes 2 sweetened beverage(s) daily.  He is taking medications regularly.         Hospital Follow-up Visit: RLL, insomnia, hospital f/u    Hospital/Nursing Home/IP Rehab Facility: [facility]  Date of Admission: 11/06/219  Date of Discharge: 11/07/219  Reason(s) for Admission: nausea, abdominal pain and increased ostomy output.            Problems taking medications regularly:  None       Medication changes since discharge: None       Problems adhering to non-medication therapy:  None    Summary of hospitalization:  [facility] discharge summary reviewed  Diagnostic Tests/Treatments reviewed.  Follow up needed: surgical follow-up scheduled for colostomy take-down  Other Healthcare Providers Involved in Patient s Care:         Surgical follow-up appointment - surgery scheduled  Update since discharge: improved.     Post Discharge Medication Reconciliation: discharge medications reconciled, continue medications without change.  Plan of care communicated with patient     Coding guidelines for this visit:  Type of Medical   Decision Making Face-to-Face Visit       within 7 Days of discharge Face-to-Face Visit        within 14 days of discharge   Moderate Complexity [id number]   High Complexity [phone] [id number]          Jim comes in for follow-up of recent admission for diarrhea and dehydration. He has a history of partial colectomy and colostomy secondary to complicated  diverticulitis earlier this year, and is scheduled for colostomy takedown next month. He was admitted and received IVF, noted to have GRICELDA as well presumed secondary to dehydration. Creatinine did trend down with IVF prior to discharge.    Since discharge he has been feeling well. Stool output is improved and he feels that he is staying well hydrated. He is somewhat anxious about his recent medical issues, but does not want to start with therapy or medication at this time as he is hopeful that symptoms will improve following his colostomy takedown. He is quite bothered by restless leg syndrome, which was previously diagnosed in [facility], and reports that this is preventing him from sleeping and worsening his anxiety.       Patient Active Problem List   Diagnosis     Diverticulitis     Yeast infection     Escherichia coli (E. coli) infection     Intra-abdominal abscess (H)     Diverticulitis of large intestine with perforation     Diverticulitis large intestine     Gastroenteritis     Acute kidney injury (H)     Dehydration     Colostomy status (H)     Past Surgical History:   Procedure Laterality Date     CHOLECYSTECTOMY  01/2004     INSERT STENT URETER Bilateral 9/19/2019    Procedure: PREOPERATIVE BILATERAL URETERAL STENT PLACEMENT;  Surgeon: Toni Liao MD;  Location: [facility] OR     LAPAROSCOPIC ASSISTED SIGMOID COLECTOMY N/A 9/19/2019    Procedure: LAPAROSCOPY-ASSISTED, SIGMOIDCOLECTOMY WITH END COLOSTOMY;  Surgeon: Jaimee Barrios MD;  Location:  OR       Social History     Tobacco Use     Smoking status: Never Smoker     Smokeless tobacco: Never Used   Substance Use Topics     Alcohol use: Not Currently     Family History   Problem Relation Age of Onset     Diabetes Father      Diabetes Maternal Grandmother            Reviewed and updated as needed this visit by Provider  Meds         Review of Systems   ROS COMP: Constitutional, HEENT, cardiovascular, pulmonary, gi and gu systems are negative, except as  otherwise noted.      Objective    Pulse 77   Temp 97.9  F (36.6  C) (Oral)   Resp 14   Wt 75.3 kg (166 lb)   SpO2 97%   BMI 26.00 kg/m    Body mass index is 26 kg/m .  Physical Exam   GENERAL: healthy, alert and no distress  RESP: lungs clear to auscultation - no rales, rhonchi or wheezes  CV: regular rate and rhythm, normal S1 S2, no S3 or S4, no murmur, click or rub, no peripheral edema and peripheral pulses strong  ABDOMEN: soft, NT, normactive bowel sounds. Colostomy bag in place without erythema or concerning drainage.   PSYCH: mentation appears normal, affect normal/bright          Assessment & Plan       ICD-10-CM    1. Acute kidney injury (H) N17.9 Basic metabolic panel  (Ca, Cl, CO2, Creat, Gluc, K, Na, BUN)   2. Dehydration E86.0    3. Restless leg syndrome G25.81 pramipexole (MIRAPEX) 0.125 MG tablet     Ferritin   4. Colostomy status (H) Z93.3    5. Gastroenteritis K52.9    6. Need for prophylactic vaccination and inoculation against influenza Z23 INFLUENZA QUAD, RECOMBINANT, P-FREE (RIV4) (FLUBLOCK) [44125]     Vaccine Administration, Initial [83426]        Seems to be doing well post-hospitalization for dehydration and GRICELDA. Diarrhea largely resolved. Will repeat BMP and make sure that renal function has returned to normal.    Will obtain ferritin for RLS symptoms (would target goal of 75, although this could be challenging to interpret in setting of multiple recent infections) and discussed treatment options. Patient interested in starting pramipexole; reviewed risks and benefits and he elects to proceed.  Patient Instructions   Labs today to recheck kidneys.     Follow-up with surgeon as scheduled. Let us know if you need a pre-operative exam prior to surgery.     For restless legs:  -- lab work (iron study) today  -- try pramipexole 0.125mg before bed. Can increase every 3 days by 1 tab to max of 4 tabs at night (0.5mg). We can refill at a higher dose if needed in the future.       No  follow-ups on file.    Anali Bermudez MD  [facility]

## 2019-11-18 NOTE — LETTER
[address] [address]                  [phone]   November 20, 2019    Jim Mixon  [address] [place] [address]      Dear Jim,     Please find your lab results enclosed.     Your kidney function has normalized. Electrolytes also look good.     Your ferritin level is pretty good. I would have you continue taking an iron supplement/multivitamin with iron as we want this slightly higher (around 75) for your restless leg syndrome.     Please let me know if you have any concerns or questions!     Sincerely,     Anali Mccabe MD   Internal Medicine-Pediatrics       Results for orders placed or performed in visit on 11/18/19   Basic metabolic panel  (Ca, Cl, CO2, Creat, Gluc, K, Na, BUN)     Status: None   Result Value Ref Range    Sodium 137 133 - 144 mmol/L    Potassium 4.2 3.4 - 5.3 mmol/L    Chloride 106 94 - 109 mmol/L    Carbon Dioxide 25 20 - 32 mmol/L    Anion Gap 6 3 - 14 mmol/L    Glucose 95 70 - 99 mg/dL    Urea Nitrogen 12 7 - 30 mg/dL    Creatinine 0.99 0.66 - 1.25 mg/dL    GFR Estimate 86 >60 mL/min/[1.73_m2]    GFR Estimate If Black >90 >60 mL/min/[1.73_m2]    Calcium 9.5 8.5 - 10.1 mg/dL   Ferritin     Status: None   Result Value Ref Range    Ferritin 63 26 - 388 ng/mL

## 2019-11-19 LAB
ANION GAP SERPL CALCULATED.3IONS-SCNC: 6 MMOL/L (ref 3–14)
BUN SERPL-MCNC: 12 MG/DL (ref 7–30)
CALCIUM SERPL-MCNC: 9.5 MG/DL (ref 8.5–10.1)
CHLORIDE SERPL-SCNC: 106 MMOL/L (ref 94–109)
CO2 SERPL-SCNC: 25 MMOL/L (ref 20–32)
CREAT SERPL-MCNC: 0.99 MG/DL (ref 0.66–1.25)
FERRITIN SERPL-MCNC: 63 NG/ML (ref 26–388)
GFR SERPL CREATININE-BSD FRML MDRD: 86 ML/MIN/{1.73_M2}
GLUCOSE SERPL-MCNC: 95 MG/DL (ref 70–99)
POTASSIUM SERPL-SCNC: 4.2 MMOL/L (ref 3.4–5.3)
SODIUM SERPL-SCNC: 137 MMOL/L (ref 133–144)

## 2019-11-19 ASSESSMENT — PATIENT HEALTH QUESTIONNAIRE - PHQ9: SUM OF ALL RESPONSES TO PHQ QUESTIONS 1-9: 7

## 2019-12-02 RX ORDER — IBUPROFEN 200 MG
400 TABLET ORAL EVERY 4 HOURS PRN
COMMUNITY
End: 2019-12-03

## 2019-12-02 RX ORDER — OSELTAMIVIR PHOSPHATE 30 MG/1
CAPSULE ORAL 2 TIMES DAILY
COMMUNITY
End: 2019-12-03

## 2019-12-03 ENCOUNTER — OFFICE VISIT (OUTPATIENT)
Dept: FAMILY MEDICINE | Facility: CLINIC | Age: 54
End: 2019-12-03
Payer: COMMERCIAL

## 2019-12-03 VITALS
SYSTOLIC BLOOD PRESSURE: 132 MMHG | WEIGHT: 165 LBS | HEART RATE: 84 BPM | BODY MASS INDEX: 25.9 KG/M2 | OXYGEN SATURATION: 99 % | HEIGHT: 67 IN | TEMPERATURE: 97.8 F | DIASTOLIC BLOOD PRESSURE: 82 MMHG

## 2019-12-03 DIAGNOSIS — J06.9 UPPER RESPIRATORY TRACT INFECTION, UNSPECIFIED TYPE: ICD-10-CM

## 2019-12-03 DIAGNOSIS — K57.20 DIVERTICULITIS OF LARGE INTESTINE WITH PERFORATION, UNSPECIFIED BLEEDING STATUS: ICD-10-CM

## 2019-12-03 DIAGNOSIS — Z01.818 PREOP GENERAL PHYSICAL EXAM: Primary | ICD-10-CM

## 2019-12-03 DIAGNOSIS — Z90.49 S/P COLON RESECTION: ICD-10-CM

## 2019-12-03 DIAGNOSIS — Z93.3 COLOSTOMY STATUS (H): ICD-10-CM

## 2019-12-03 PROBLEM — K21.9 GASTROESOPHAGEAL REFLUX DISEASE: Status: ACTIVE | Noted: 2018-11-27

## 2019-12-03 PROBLEM — K52.9 GASTROENTERITIS: Status: RESOLVED | Noted: 2019-11-06 | Resolved: 2019-12-03

## 2019-12-03 PROBLEM — N17.9 ACUTE KIDNEY INJURY (H): Status: RESOLVED | Noted: 2019-11-06 | Resolved: 2019-12-03

## 2019-12-03 PROBLEM — E86.0 DEHYDRATION: Status: RESOLVED | Noted: 2019-11-06 | Resolved: 2019-12-03

## 2019-12-03 PROCEDURE — 99214 OFFICE O/P EST MOD 30 MIN: CPT | Performed by: PHYSICIAN ASSISTANT

## 2019-12-03 RX ORDER — CEFAZOLIN SODIUM 1 G/3ML
1 INJECTION, POWDER, FOR SOLUTION INTRAMUSCULAR; INTRAVENOUS SEE ADMIN INSTRUCTIONS
Status: CANCELLED | OUTPATIENT
Start: 2019-12-03

## 2019-12-03 RX ORDER — CEFAZOLIN SODIUM 2 G/100ML
2 INJECTION, SOLUTION INTRAVENOUS
Status: CANCELLED | OUTPATIENT
Start: 2019-12-03

## 2019-12-03 ASSESSMENT — MIFFLIN-ST. JEOR: SCORE: 1542.07

## 2019-12-03 NOTE — PROGRESS NOTES
[facility]  [address]  [place] [address]  [phone]  Dept: [phone]    PRE-OP EVALUATION:  Today's date: 12/3/2019    Jim Mixon (: 1965) presents for pre-operative evaluation assessment as requested by Dr. Barrios.  He requires evaluation and anesthesia risk assessment prior to undergoing surgery/procedure for treatment of Colostomy Status.    Proposed Surgery/ Procedure: Colonscopy and LAPAROSCOPIC ASSISTED COLOSTOMY TAKEDOWN  Date of Surgery/ Procedure: 2019 and 12/10/2019  Time of Surgery/ Procedure: 8:55 am  Hospital/Surgical Facility: [facility]  Fax number for surgical facility:   Primary Physician: Major Cherry  Type of Anesthesia Anticipated: Monitor Anesthesia Care    Patient has a Health Care Directive or Living Will:  NO    1. NO - Do you have a history of heart attack, stroke, stent, bypass or surgery on an artery in the head, neck, heart or legs?  2. NO - Do you ever have any pain or discomfort in your chest?  3. NO - Do you have a history of  Heart Failure?  4. NO - Are you troubled by shortness of breath when: walking on the level, up a slight hill or at night?  5. YES - Do you currently have a cold, bronchitis or other respiratory infection? Currently has a cold - has 2 kids at home that are sick. Sore throat and nasal congestion 2-3 days. No fevers or cough.   6. NO - Do you have a cough, shortness of breath or wheezing?  7. NO - Do you sometimes get pains in the calves of your legs when you walk?  8. NO - Do you or anyone in your family have previous history of blood clots?  9. NO - Do you or does anyone in your family have a serious bleeding problem such as prolonged bleeding following surgeries or cuts?  10. NO - Have you ever had problems with anemia or been told to take iron pills?  11. NO - Have you had any abnormal blood loss such as black, tarry or bloody stools, or abnormal vaginal bleeding?  12. NO - Have you  ever had a blood transfusion?  13. NO - Have you or any of your relatives ever had problems with anesthesia?  14. NO - Do you have sleep apnea, excessive snoring or daytime drowsiness?  15. NO - Do you have any prosthetic heart valves?  16. NO - Do you have prosthetic joints?  17. NO - Is there any chance that you may be pregnant?      HPI:     HPI related to upcoming procedure: Jim is a 53 yo male here today for pre-op. Unfortunately, he suffered a bout of sigmoid diverticulitis with secondary bowel perforation and abscess requiring sigmoid colectomy with colostomy placement in September. Followed general surgery and is now pursuing colonoscopy for re-assessment and ostomy takedown.     Hx was complicated recently by acute gastroenteritis with secondary dehydration and acute kidney injury at the beginning of Nov. Saw PCP for recheck 11/18 and renal function had returned to normal. He is feeling much better from this without any residual symptoms.    Generally healthy otherwise and reports no hx HTN or chronic diseases like DM.     See problem list for active medical problems.  Problems all longstanding and stable, except as noted/documented.  See ROS for pertinent symptoms related to these conditions.      MEDICAL HISTORY:     Patient Active Problem List    Diagnosis Date Noted     Yeast infection 09/07/2019     Priority: High     Escherichia coli (E. coli) infection 09/07/2019     Priority: High     Intra-abdominal abscess (H) 09/07/2019     Priority: High     Colostomy status (H) 11/13/2019     Priority: Medium     Added automatically from request for surgery [id number]       S/P colon resection 09/24/2019     Priority: Medium     Diverticulitis large intestine 09/19/2019     Priority: Medium     Diverticulitis of large intestine with perforation 09/18/2019     Priority: Medium     Diverticulitis 08/19/2019     Priority: Medium     Gastroesophageal reflux disease 11/27/2018     Priority: Medium      Past  Medical History:   Diagnosis Date     Acute kidney injury (H) 11/6/2019     Anxiety      Dehydration 11/6/2019     Diverticulitis      Gastroenteritis 11/6/2019     GERD (gastroesophageal reflux disease)      Hiatal hernia 2006     Hypertension     stress or pain related     Noninfectious ileitis      Past Surgical History:   Procedure Laterality Date     CHOLECYSTECTOMY  01/2004     COLOSTOMY       ENDOSCOPY       INSERT STENT URETER Bilateral 9/19/2019    Procedure: PREOPERATIVE BILATERAL URETERAL STENT PLACEMENT;  Surgeon: Toni Liao MD;  Location:  OR     LAPAROSCOPIC ASSISTED SIGMOID COLECTOMY N/A 9/19/2019    Procedure: LAPAROSCOPY-ASSISTED, SIGMOIDCOLECTOMY WITH END COLOSTOMY;  Surgeon: aJimee Barrios MD;  Location:  OR     Current Outpatient Medications   Medication Sig Dispense Refill     acetaminophen (TYLENOL) 500 MG tablet Take 1,000 mg by mouth every 6 hours as needed for mild pain       esomeprazole (NEXIUM) 20 MG DR capsule Take 20 mg by mouth every morning (before breakfast) Take 30-60 minutes before eating.       LORazepam (ATIVAN) 1 MG tablet Take 1 mg by mouth every 6 hours as needed for anxiety       OTC products: None, except as noted above    Allergies   Allergen Reactions     Ciprofloxacin Itching     Reddness,      Morphine Hives and Itching     Histamine reaction      Latex Allergy: NO    Social History     Tobacco Use     Smoking status: Never Smoker     Smokeless tobacco: Never Used   Substance Use Topics     Alcohol use: Not Currently     History   Drug Use Unknown       REVIEW OF SYSTEMS:   CONSTITUTIONAL: NEGATIVE for fever, chills, change in weight  INTEGUMENTARY/SKIN: NEGATIVE for worrisome rashes, moles or lesions  EYES: NEGATIVE for vision changes or irritation  ENT/MOUTH: NEGATIVE for ear, mouth and throat problems  RESP: NEGATIVE for significant cough or SOB  CV: NEGATIVE for chest pain, palpitations or peripheral edema  GI: NEGATIVE for nausea, abdominal pain,  "heartburn, or change in bowel habits  : NEGATIVE for frequency, dysuria, or hematuria  MUSCULOSKELETAL: NEGATIVE for significant arthralgias or myalgia  NEURO: NEGATIVE for weakness, dizziness or paresthesias  ENDOCRINE: NEGATIVE for temperature intolerance, skin/hair changes  HEME: NEGATIVE for bleeding problems  PSYCHIATRIC: NEGATIVE for changes in mood or affect    EXAM:   /82 (BP Location: Right arm, Cuff Size: Adult Large)   Pulse 84   Temp 97.8  F (36.6  C) (Oral)   Ht 1.702 m (5' 7\")   Wt 74.8 kg (165 lb)   SpO2 99%   BMI 25.84 kg/m      GENERAL APPEARANCE: healthy, alert and no distress     EYES: EOMI,  PERRL     HENT: ear canals and TM's normal and nose and mouth without ulcers or lesions     NECK: no adenopathy, no asymmetry, masses, or scars and thyroid normal to palpation     RESP: lungs clear to auscultation - no rales, rhonchi or wheezes     CV: regular rates and rhythm, normal S1 S2, no S3 or S4 and no murmur, click or rub     ABDOMEN:  soft, nontender, no HSM or masses and bowel sounds normal     MS: extremities normal- no gross deformities noted, no evidence of inflammation in joints, FROM in all extremities.     SKIN: no suspicious lesions or rashes     NEURO: Normal strength and tone, sensory exam grossly normal, mentation intact and speech normal     PSYCH: mentation appears normal. and affect normal/bright     LYMPHATICS: No cervical adenopathy    DIAGNOSTICS:   EKG 11/6/2019: Rate 98 bpm. NE interval 138. QRS duration 84. QT/QTc 340/434. P-R-T axes 78 5 92. Normal sinus rhythm. Nonspecific ST and T wave abnormality, but no significant depression or elevation. Abnormal ECG. Criteria for septal infarct no longer present when compared to previous. Agree with computer interpretation.     Recent Labs   Lab Test 11/18/19  1234 11/07/19  0630 11/06/19  1447 09/25/19  0700  09/21/19  0702  09/04/19  1913   HGB  --   --  17.3  --   --  8.0*   < > 12.8*   PLT  --   --  450 411   < > 316   < " > 458*   INR  --   --   --   --   --   --   --  1.06    139 134  --    < > 138   < > 134   POTASSIUM 4.2 3.5 3.4  --    < > 4.7   < > 4.4   CR 0.99 1.42* 1.83*  --    < > 1.15   < > 1.01    < > = values in this interval not displayed.      IMPRESSION:   Reason for surgery/procedure: diverticulitis with secondary perforation/Colonscopy and LAPAROSCOPIC ASSISTED COLOSTOMY TAKEDOWN  Diagnosis/reason for consult: pre-op, recent secondary dehydration and acute renal injury from gastroenteritis that his since resolved.    The proposed surgical procedure is considered INTERMEDIATE risk.    REVISED CARDIAC RISK INDEX  The patient has the following serious cardiovascular risks for perioperative complications such as (MI, PE, VFib and 3  AV Block):  No serious cardiac risks  INTERPRETATION: 0 risks: Class I (very low risk - 0.4% complication rate)    The patient has the following additional risks for perioperative complications:  No identified additional risks      ICD-10-CM    1. Preop general physical exam Z01.818    2. Colostomy status (H) Z93.3    3. S/P colon resection Z90.49    4. Diverticulitis of large intestine with perforation, unspecified bleeding status K57.20        RECOMMENDATIONS:     --Consult hospital rounder / IM to assist post-op medical management      APPROVAL GIVEN to proceed with proposed procedure, without further diagnostic evaluation.  Pt did mention a few days of URI sx c/w a virus, but reports he is already feeling better. He will expect to continue to improve by time of surgery. He was encouraged to follow-up if worse and surgery may need re-scheduling if fever or any respiratory complication occurs. Patient in agreement with plan.        Signed Electronically by: Itzel Reeder PA-C    Copy of this evaluation report is provided to requesting physician.    Major Preop Guidelines    Revised Cardiac Risk Index

## 2019-12-04 ENCOUNTER — OFFICE VISIT (OUTPATIENT)
Dept: SURGERY | Facility: CLINIC | Age: 54
End: 2019-12-04
Payer: COMMERCIAL

## 2019-12-04 VITALS
OXYGEN SATURATION: 96 % | HEART RATE: 76 BPM | WEIGHT: 165 LBS | HEIGHT: 67 IN | BODY MASS INDEX: 25.9 KG/M2 | SYSTOLIC BLOOD PRESSURE: 144 MMHG | RESPIRATION RATE: 16 BRPM | DIASTOLIC BLOOD PRESSURE: 92 MMHG

## 2019-12-04 DIAGNOSIS — Z93.3 COLOSTOMY STATUS (H): Primary | ICD-10-CM

## 2019-12-04 PROCEDURE — 99214 OFFICE O/P EST MOD 30 MIN: CPT | Performed by: SURGERY

## 2019-12-04 RX ORDER — NEOMYCIN SULFATE 500 MG/1
1000 TABLET ORAL 4 TIMES DAILY
Qty: 4 TABLET | Refills: 0 | Status: ON HOLD | OUTPATIENT
Start: 2019-12-04 | End: 2019-12-13

## 2019-12-04 RX ORDER — ONDANSETRON 4 MG/1
4 TABLET, ORALLY DISINTEGRATING ORAL EVERY 8 HOURS PRN
Qty: 5 TABLET | Refills: 0 | Status: SHIPPED | OUTPATIENT
Start: 2019-12-04 | End: 2019-12-16

## 2019-12-04 RX ORDER — METRONIDAZOLE 500 MG/1
500 TABLET ORAL 3 TIMES DAILY
Qty: 2 TABLET | Refills: 0 | Status: ON HOLD | OUTPATIENT
Start: 2019-12-04 | End: 2019-12-13

## 2019-12-04 ASSESSMENT — MIFFLIN-ST. JEOR: SCORE: 1542.07

## 2019-12-04 NOTE — PROGRESS NOTES
"Established Patient Visit    Patient is here to discuss colostomy reversal. He underwent lap assisted sigmoid colectomy with mcconnell's pouch and end ostomy on 9/19/19 for failed conservative management of complicated diverticulitis with microperforation and abscess. Recovered as expected and discharged 9/25/19. Was admitted for one day on 11/6/19 for gastroenteritis with dehydration/GRICELDA.   He is anxious to have reversal and requested takedown at 3 months if possible so he could be fully recovered by early February when he is moving.  He is doing well, has been eating normally, gained some weight back. Having normal stool out of the ostomy. No pouching issues.      PMH, PSH, social, family history reviewed and updated in epic      Vitals: BP (!) 144/92   Pulse 76   Resp 16   Ht 1.702 m (5' 7\")   Wt 74.8 kg (165 lb)   SpO2 96%   BMI 25.84 kg/m    BMI= Body mass index is 25.84 kg/m .  Constitutional:  This is a well developed, well nourished man in no apparent distress.  Eyes:  no scleral icterus or redness.  ENT - external ears normal. nares without drainage. mucous membranes moist.   GI:   soft, non-distended, non-tender  LLQ ostomy pink, patent   MSK- extremities warm without edema  Skin - no rashes, no lesions  Neurologic - non-focal, moves extremities symmetrically, grossly normal strength and sensation  Psych - normal affect    Most recent CT reviewed from 11/6    A/P  Proceed with lap assisted colostomy takedown with preop ureteral stents on 12/10/19. Will complete a colonoscopy day prior to ensure no other colon lesions.  We discussed surgery in detail, recovery and risks. Patient agree to proceed as planned.    Time spent of which more than 50% was counseling and coordinating care:  30 minutes.      Jaimee Barrios MD    "

## 2019-12-09 ENCOUNTER — OFFICE VISIT (OUTPATIENT)
Dept: SURGERY | Facility: PHYSICIAN GROUP | Age: 54
End: 2019-12-09
Payer: COMMERCIAL

## 2019-12-09 ENCOUNTER — OFFICE VISIT (OUTPATIENT)
Dept: INTERPRETER SERVICES | Facility: CLINIC | Age: 54
End: 2019-12-09
Payer: COMMERCIAL

## 2019-12-09 ENCOUNTER — ANESTHESIA (OUTPATIENT)
Dept: SURGERY | Facility: CLINIC | Age: 54
End: 2019-12-09
Payer: COMMERCIAL

## 2019-12-09 ENCOUNTER — ANESTHESIA EVENT (OUTPATIENT)
Dept: SURGERY | Facility: CLINIC | Age: 54
End: 2019-12-09
Payer: COMMERCIAL

## 2019-12-09 ENCOUNTER — HOSPITAL ENCOUNTER (OUTPATIENT)
Facility: CLINIC | Age: 54
Discharge: HOME | End: 2019-12-09
Attending: SURGERY | Admitting: SURGERY
Payer: COMMERCIAL

## 2019-12-09 VITALS
HEIGHT: 67 IN | SYSTOLIC BLOOD PRESSURE: 144 MMHG | RESPIRATION RATE: 16 BRPM | HEART RATE: 80 BPM | WEIGHT: 159 LBS | BODY MASS INDEX: 24.96 KG/M2 | DIASTOLIC BLOOD PRESSURE: 98 MMHG | TEMPERATURE: 97.3 F | OXYGEN SATURATION: 95 %

## 2019-12-09 DIAGNOSIS — Z53.9 ERRONEOUS ENCOUNTER--DISREGARD: Primary | ICD-10-CM

## 2019-12-09 DIAGNOSIS — Z93.3 COLOSTOMY STATUS (H): Primary | ICD-10-CM

## 2019-12-09 LAB — COLONOSCOPY: NORMAL

## 2019-12-09 PROCEDURE — 40000037 ZZH STATISTIC COLONOSCOPY (OR PROCEDURE): Performed by: SURGERY

## 2019-12-09 PROCEDURE — T1013 SIGN LANG/ORAL INTERPRETER: HCPCS | Mod: U3

## 2019-12-09 PROCEDURE — 40000306 ZZH STATISTIC PRE PROC ASSESS II: Performed by: SURGERY

## 2019-12-09 PROCEDURE — 71000027 ZZH RECOVERY PHASE 2 EACH 15 MINS: Performed by: SURGERY

## 2019-12-09 PROCEDURE — 37000008 ZZH ANESTHESIA TECHNICAL FEE, 1ST 30 MIN: Performed by: SURGERY

## 2019-12-09 PROCEDURE — 25000125 ZZHC RX 250: Performed by: NURSE ANESTHETIST, CERTIFIED REGISTERED

## 2019-12-09 PROCEDURE — 25000128 H RX IP 250 OP 636: Performed by: NURSE ANESTHETIST, CERTIFIED REGISTERED

## 2019-12-09 PROCEDURE — 36000050 ZZH SURGERY LEVEL 2 1ST 30 MIN: Performed by: SURGERY

## 2019-12-09 PROCEDURE — 27210794 ZZH OR GENERAL SUPPLY STERILE: Performed by: SURGERY

## 2019-12-09 PROCEDURE — 25800030 ZZH RX IP 258 OP 636: Performed by: NURSE ANESTHETIST, CERTIFIED REGISTERED

## 2019-12-09 PROCEDURE — 44388 COLONOSCOPY THRU STOMA SPX: CPT | Mod: 58 | Performed by: SURGERY

## 2019-12-09 RX ORDER — ONDANSETRON 2 MG/ML
4 INJECTION INTRAMUSCULAR; INTRAVENOUS EVERY 6 HOURS PRN
Status: CANCELLED | OUTPATIENT
Start: 2019-12-09

## 2019-12-09 RX ORDER — DIMENHYDRINATE 50 MG/ML
25 INJECTION, SOLUTION INTRAMUSCULAR; INTRAVENOUS
Status: CANCELLED | OUTPATIENT
Start: 2019-12-09

## 2019-12-09 RX ORDER — NALOXONE HYDROCHLORIDE 0.4 MG/ML
.1-.4 INJECTION, SOLUTION INTRAMUSCULAR; INTRAVENOUS; SUBCUTANEOUS
Status: CANCELLED | OUTPATIENT
Start: 2019-12-09 | End: 2019-12-10

## 2019-12-09 RX ORDER — HYDRALAZINE HYDROCHLORIDE 20 MG/ML
2.5-5 INJECTION INTRAMUSCULAR; INTRAVENOUS EVERY 10 MIN PRN
Status: CANCELLED | OUTPATIENT
Start: 2019-12-09

## 2019-12-09 RX ORDER — FENTANYL CITRATE 50 UG/ML
25-50 INJECTION, SOLUTION INTRAMUSCULAR; INTRAVENOUS
Status: CANCELLED | OUTPATIENT
Start: 2019-12-09

## 2019-12-09 RX ORDER — LABETALOL HYDROCHLORIDE 5 MG/ML
10 INJECTION, SOLUTION INTRAVENOUS
Status: CANCELLED | OUTPATIENT
Start: 2019-12-09

## 2019-12-09 RX ORDER — MEPERIDINE HYDROCHLORIDE 25 MG/ML
12.5 INJECTION INTRAMUSCULAR; INTRAVENOUS; SUBCUTANEOUS
Status: CANCELLED | OUTPATIENT
Start: 2019-12-09

## 2019-12-09 RX ORDER — PROPOFOL 10 MG/ML
INJECTION, EMULSION INTRAVENOUS CONTINUOUS PRN
Status: DISCONTINUED | OUTPATIENT
Start: 2019-12-09 | End: 2019-12-09

## 2019-12-09 RX ORDER — TAMSULOSIN HYDROCHLORIDE 0.4 MG/1
0.4 CAPSULE ORAL
Status: CANCELLED | OUTPATIENT
Start: 2019-12-09

## 2019-12-09 RX ORDER — ERTAPENEM 1 G/1
1 INJECTION, POWDER, LYOPHILIZED, FOR SOLUTION INTRAMUSCULAR; INTRAVENOUS EVERY 24 HOURS
Status: CANCELLED | OUTPATIENT
Start: 2019-12-09

## 2019-12-09 RX ORDER — LIDOCAINE HYDROCHLORIDE 10 MG/ML
INJECTION, SOLUTION INFILTRATION; PERINEURAL PRN
Status: DISCONTINUED | OUTPATIENT
Start: 2019-12-09 | End: 2019-12-09

## 2019-12-09 RX ORDER — FLUMAZENIL 0.1 MG/ML
0.2 INJECTION, SOLUTION INTRAVENOUS
Status: CANCELLED | OUTPATIENT
Start: 2019-12-09 | End: 2019-12-09

## 2019-12-09 RX ORDER — SODIUM CHLORIDE, SODIUM LACTATE, POTASSIUM CHLORIDE, CALCIUM CHLORIDE 600; 310; 30; 20 MG/100ML; MG/100ML; MG/100ML; MG/100ML
INJECTION, SOLUTION INTRAVENOUS CONTINUOUS
Status: CANCELLED | OUTPATIENT
Start: 2019-12-09

## 2019-12-09 RX ORDER — ONDANSETRON 2 MG/ML
4 INJECTION INTRAMUSCULAR; INTRAVENOUS EVERY 30 MIN PRN
Status: CANCELLED | OUTPATIENT
Start: 2019-12-09

## 2019-12-09 RX ORDER — ONDANSETRON 4 MG/1
4 TABLET, ORALLY DISINTEGRATING ORAL EVERY 30 MIN PRN
Status: CANCELLED | OUTPATIENT
Start: 2019-12-09

## 2019-12-09 RX ORDER — ONDANSETRON 4 MG/1
4 TABLET, ORALLY DISINTEGRATING ORAL EVERY 6 HOURS PRN
Status: CANCELLED | OUTPATIENT
Start: 2019-12-09

## 2019-12-09 RX ORDER — CEFAZOLIN SODIUM 2 G/100ML
2 INJECTION, SOLUTION INTRAVENOUS
Status: DISCONTINUED | OUTPATIENT
Start: 2019-12-09 | End: 2019-12-09

## 2019-12-09 RX ORDER — SODIUM CHLORIDE, SODIUM LACTATE, POTASSIUM CHLORIDE, CALCIUM CHLORIDE 600; 310; 30; 20 MG/100ML; MG/100ML; MG/100ML; MG/100ML
INJECTION, SOLUTION INTRAVENOUS CONTINUOUS PRN
Status: DISCONTINUED | OUTPATIENT
Start: 2019-12-09 | End: 2019-12-09

## 2019-12-09 RX ORDER — CEFAZOLIN SODIUM 1 G/3ML
1 INJECTION, POWDER, FOR SOLUTION INTRAMUSCULAR; INTRAVENOUS SEE ADMIN INSTRUCTIONS
Status: DISCONTINUED | OUTPATIENT
Start: 2019-12-09 | End: 2019-12-09

## 2019-12-09 RX ORDER — HYDROMORPHONE HYDROCHLORIDE 1 MG/ML
.3-.5 INJECTION, SOLUTION INTRAMUSCULAR; INTRAVENOUS; SUBCUTANEOUS EVERY 10 MIN PRN
Status: CANCELLED | OUTPATIENT
Start: 2019-12-09

## 2019-12-09 RX ORDER — ALBUTEROL SULFATE 0.83 MG/ML
2.5 SOLUTION RESPIRATORY (INHALATION) EVERY 4 HOURS PRN
Status: CANCELLED | OUTPATIENT
Start: 2019-12-09

## 2019-12-09 RX ORDER — ONDANSETRON 2 MG/ML
INJECTION INTRAMUSCULAR; INTRAVENOUS PRN
Status: DISCONTINUED | OUTPATIENT
Start: 2019-12-09 | End: 2019-12-09

## 2019-12-09 RX ADMIN — LIDOCAINE HYDROCHLORIDE 15 MG: 10 INJECTION, SOLUTION INFILTRATION; PERINEURAL at 09:41

## 2019-12-09 RX ADMIN — SODIUM CHLORIDE, POTASSIUM CHLORIDE, SODIUM LACTATE AND CALCIUM CHLORIDE: 600; 310; 30; 20 INJECTION, SOLUTION INTRAVENOUS at 09:36

## 2019-12-09 RX ADMIN — LIDOCAINE HYDROCHLORIDE 10 MG: 10 INJECTION, SOLUTION INFILTRATION; PERINEURAL at 09:39

## 2019-12-09 RX ADMIN — LIDOCAINE HYDROCHLORIDE 25 MG: 10 INJECTION, SOLUTION INFILTRATION; PERINEURAL at 09:38

## 2019-12-09 RX ADMIN — PROPOFOL 100 MCG/KG/MIN: 10 INJECTION, EMULSION INTRAVENOUS at 09:39

## 2019-12-09 RX ADMIN — ONDANSETRON HYDROCHLORIDE 4 MG: 2 INJECTION, SOLUTION INTRAVENOUS at 09:41

## 2019-12-09 ASSESSMENT — MIFFLIN-ST. JEOR: SCORE: 1514.85

## 2019-12-09 NOTE — LETTER
November 26, 2019      Jim Mixon  [address] [place] [address]        Dear Jim,     Thank you for choosing [facility]. You are scheduled for the following service(s).   Please be aware that coverage of these services is subject to the terms and limitations of your health insurance plan.  Call member services at your health plan with any benefit or coverage questions.    You will need to have a History and Physical Exam done within 30 days of this scheduled procedures. Please arrange this with your primary care physician.    Date:   12/09/2019 Monday  Procedure:   COLONOSCOPY  Doctor: Dr. Barrios                     Arrival Time:  7:30 am    *check in at the Surgery Center desk*   Procedure Time: 9:00 am    Location:   [facility]      Surgery Center (on the south side of the [facility])       [address] [place], [place] [address]      Colonoscopy is the most accurate test to detect colon polyps and colon cancer; and the only test where polyps can be removed. During this procedure, a doctor examines the lining of your large intestine and rectum through a flexible tube.       Transportation  You must arrange for a ride for the day of your procedure with a responsible adult. A taxi , Uber, etc, is not an option unless you are accompanied by a responsible adult. If you fail to arrange transportation with a responsible adult, your procedure will be cancelled and rescheduled.  NuLYTELY  PREP  Fill your enclosed  prescription for NuLYTELY  at your local pharmacy. Please call our office at [phone] if you did not receive a prescription.      PREPARATION FOR COLONOSCOPY  7 days before:    Discontinue fiber supplements and medications containing iron. This includes Metamucil  and Fibercon ; and multivitamins with iron.  3 days before:    Begin a low-fiber diet. A low-fiber diet helps making the cleanout more effective. For additional  details on low-fiber diet, please refer to the table on the last page.  2 days before:    Continue the low-fiber diet.     Drink at least 8 glasses of water throughout the day.     Stop eating solid foods at 11:45 pm.  1. 1 day before:    In the morning: begin a clear liquid diet (liquids you can see through).     Examples of a clear liquid diet include: water, clear broth or bouillon, Gatorade, Pedialyte or Powerade, carbonated and non-carbonated soft drinks (Sprite , 7-Up , ginger ale), strained fruit juices without pulp (apple, white grape, white cranberry), Jell-O  and popsicles.     The following are not allowed on a clear liquid diet: red liquids, alcoholic beverages,dairy products (milk, creamer, and yogurt), protein shakes,  juice with pulp and chewing tobacco.    At 4pm: drink 1 (one) 8 oz glass of NuLYTELY  solution every 15 minutes until the bottle (approximately 16 glasses of 8 oz) is gone. Keep the solution refrigerated. Do not drink any other liquids while you are drinking the NuLYTELY  solution.    Over the course of the evening, drink an additional   liter of clear liquids and continue clear liquid diet.    COLON CLEANSING TIPS: drink adequate amounts of fluids before and after your colon cleansing to prevent dehydration. Stay near a toilet because you will have diarrhea. Even if you are sitting on the toilet, continue to drink the cleansing solution every 15 minutes. If you feel nauseous or vomit, rinse your mouth with water, take a 15 to 30-minute-break and then continue drinking the solution. You will be uncomfortable until the stool has flushed from your colon (in about 2 to 4 hours). You may feel chilled.    DAY OF YOUR PROCEDURE  You may take all of your morning medications including blood pressure medications, blood thinners (if you have not been instructed to stop these by our office), methadone, and anti-seizure medications with sips of water 3 hours prior to your procedure or earlier. Do  not take insulin or vitamins prior to your procedure. Continue the clear liquid diet.      4 hours prior:  STOP drinking all liquids at this time.     You are ready for the procedure, if you followed all instructions and your stool is no longer formed, but clear or yellow liquid. If you are unsure whether your colon is clean, please call our office at [phone] before you leave for your appointment.    Bring the following to your procedure:  - Insurance Card/Photo ID.   - List of current medications including over-the-counter medications and supplements.   - Your rescue inhaler if you currently use one to control asthma.    Canceling or rescheduling your appointment:   If you must cancel or reschedule your appointment, please call [phone] as soon as possible.    COLONOSCOPY PRE-PROCEDURE CHECKLIST  If you have diabetes, ask your regular doctor for diet and medication restrictions.  If you take an anticoagulant or anti-platelet medication (such as Coumadin , Lovenox , Pradaxa , Xarelto , Eliquis , etc.), please call your primary doctor for advice on holding this medication.  If you take aspirin you may continue to do so.  If you are or may be pregnant, please discuss the risks and benefits of this procedure with your doctor.    What happens during a colonoscopy?  Plan to spend up to two hours, starting at registration time, at the endoscopy center the day of your procedure. The colonoscopy takes an average of 15 to 30 minutes. Recovery time is about 30 minutes.    Before the exam:    You will change into a gown.    Your medical history and medication list will be reviewed with you, unless that has been done over the phone prior to the procedure.     A nurse will insert an intravenous (IV) line into your hand or arm.    The doctor will meet with you and will give you a consent form to sign.  1.   2. During the exam:     Medicine will be given through the IV line to help you relax.     Your heart rate and  oxygen levels will be monitored. If your blood pressure is low, you may be given fluids through the IV line.     The doctor will insert a flexible hollow tube, called a colonoscope, into your rectum. The scope will be advanced slowly through the large intestine (colon).    You may have a feeling of fullness or pressure.     If an abnormal tissue or a polyp is found, the doctor may remove it through the endoscope for closer examination, or biopsy. Tissue removal is painless.    After the exam:           Any tissue samples removed during the exam will be sent to a lab for evaluation. It may take 5-7 working days for you to be notified of the results.     A nurse will provide you with complete discharge instructions before you leave the endoscopy center. Be sure to ask the nurse for specific instructions if you take blood thinners such as Aspirin, Coumadin or Plavix.     The doctor will prepare a full report for you and for the physician who referred you for the procedure.     Your doctor will talk with you about the initial results of your exam.      Medication given during the exam will prohibit you from driving for the rest of the day.     Following the exam, you may resume your normal diet. Your first meal should be light, no greasy foods. Avoid alcohol until the next day.     You may resume your regular activities the day after the procedure.     LOW-FIBER DIET  Foods RECOMMENDED Foods to AVOID   Breads, Cereal, Rice and Pasta:   White bread, rolls, biscuits, croissant and kelton toast.   Waffles, Sri [place]ankan toast and pancakes.   White rice, noodles, pasta, macaroni and peeled cooked potatoes.   Plain crackers and saltines.   Cooked cereals: farina, cream of rice.   Cold cereals: Puffed Rice , Rice Krispies , Corn Flakes  and Special K    Breads, Cereal, Rice and Pasta:   Breads or rolls with nuts, seeds or fruit.   Whole wheat, pumpernickel, rye breads and cornbread.   Potatoes with skin, brown or wild rice, and kasha  (buckwheat).     Vegetables:   Tender cooked and canned vegetables without seeds: carrots, asparagus tips, green or wax beans, pumpkin, spinach, lima beans. Vegetables:   Raw or steamed vegetables.   Vegetables with seeds.   Sauerkraut.   Winter squash, peas, broccoli, Brussel sprouts, cabbage, onions, cauliflower, baked beans, peas and corn.   Fruits:   Strained fruit juice.   Canned fruit, except pineapple.   Ripe bananas and melon. Fruits:   Prunes and prune juice.   Raw fruits.   Dried fruits: figs, dates and raisins.   Milk/Dairy:   Milk: plain or flavored.   Yogurt, custard and ice cream.   Cheese and cottage cheese Milk/Dairy:     Meat and other proteins:   ground, well-cooked tender beef, lamb, ham, veal, pork, fish, poultry and organ meats.   Eggs.   Peanut butter without nuts. Meat and other proteins:   Tough, fibrous meats with gristle.   Dry beans, peas and lentils.   Peanut butter with nuts.   Tofu.   Fats, Snack, Sweets, Condiments and Beverages:   Margarine, butter, oils, mayonnaise, sour cream and salad dressing, plain gravy.   Sugar, hard candy, clear jelly, honey and syrup.   Spices, cooked herbs, bouillon, broth and soups made with allowed vegetable, ketchup and mustard.   Coffee, tea and carbonated drinks.   Plain cakes, cookies and pretzels.   Gelatin, plain puddings, custard, ice cream, sherbet and popsicles. Fats, Snack, Sweets, Condiments and Beverages:   Nuts, seeds and coconut.   Jam, marmalade and preserves.   Pickles, olives, relish and horseradish.   All desserts containing nuts, seeds, dried fruit and coconut; or made from whole grains or bran.   Candy made with nuts or seeds.   Popcorn.       DIRECTIONS TO THE SURGERY CENTER  From the north ([facility])  Take [address]W south, exit on [address] . Get into the left hand emily, turn left (east), go one-half mile to [address] and turn left. Go north to the first stoplight, take a right on [address] and  follow it to the Surgery Center entrance.    From the south ([facility])  Take 35N to the 35E split and exit on [address] . On [address] , turn left (west) to [address]. Turn right (north) on [address]. Go north to the first stoplight, take a right on [address] and follow it to the Surgery Center entrance.    From the east via 35E ([facility])  Take 35E south to [place] [address] exit. Turn right on [address] . Go west to [address]. Turn right (north) on [address]. Go to the first stoplight, take a right and follow on [address] to the Surgery Center entrance.    From the east via [address] ([facility])  Take [address] west to [address]. Turn left (south) on [address] to [address]. Turn left (east) on [address] and follow it to the Surgery Center entrance.    From the west via [address] ([place], [place])  Take [address] east to [address]. Turn right (south) on [address] to [address]. Turn left (east) on [address] and follow it to the Surgery Center entrance.

## 2019-12-09 NOTE — ANESTHESIA PREPROCEDURE EVALUATION
Anesthesia Pre-Procedure Evaluation    Patient: Jim Mixon   MRN: [id number] : 1965          Preoperative Diagnosis: Diverticulitis of colon [K57.32]    Procedure(s):  COLONOSCOPY (regino/CHUCKA) ps sent  JS    Past Medical History:   Diagnosis Date     Acute kidney injury (H) 2019     Anxiety      Dehydration 2019     Diverticulitis      Gastroenteritis 2019     GERD (gastroesophageal reflux disease)      Hiatal hernia      Hypertension     stress or pain related     Noninfectious ileitis      Past Surgical History:   Procedure Laterality Date     CHOLECYSTECTOMY  2004     COLOSTOMY       ENDOSCOPY       INSERT STENT URETER Bilateral 2019    Procedure: PREOPERATIVE BILATERAL URETERAL STENT PLACEMENT;  Surgeon: Toni Liao MD;  Location: [facility] OR     LAPAROSCOPIC ASSISTED SIGMOID COLECTOMY N/A 2019    Procedure: LAPAROSCOPY-ASSISTED, SIGMOIDCOLECTOMY WITH END COLOSTOMY;  Surgeon: Jaimee Barrios MD;  Location: [facility] OR     Anesthesia Evaluation     . Pt has had prior anesthetic. Type: General    No history of anesthetic complications          ROS/MED HX    ENT/Pulmonary:  - neg pulmonary ROS     Neurologic:  - neg neurologic ROS     Cardiovascular:     (+) hypertension----. : . . . :. .       METS/Exercise Tolerance:     Hematologic:  - neg hematologic  ROS       Musculoskeletal:  - neg musculoskeletal ROS       GI/Hepatic:     (+) GERD Asymptomatic on medication, hiatal hernia, Inflammatory bowel disease, Other GI/Hepatic diverticulitis with diverting colostomy      Renal/Genitourinary: Comment: Has recovered to normal    (+) chronic renal disease, type: ARF,       Endo:  - neg endo ROS       Psychiatric:     (+) psychiatric history anxiety      Infectious Disease:  - neg infectious disease ROS       Malignancy:      - no malignancy   Other:    - neg other ROS                      Physical Exam  Normal systems: cardiovascular, pulmonary and dental    Airway    Mallampati: I  TM distance: >3 FB  Neck ROM: full    Dental     Cardiovascular   Rhythm and rate: regular and normal      Pulmonary    breath sounds clear to auscultation    Other findings: Lab Test        11/06/19 09/25/19 09/22/19 09/21/19 09/20/19      --          09/04/19                       1447          0700          0721          0702          0653           --           1913          WBC          10.8          --           --          8.5          9.3            < >        11.0          HGB          17.3          --           --          8.0*         10.8*          < >        12.8*         MCV          88            --           --          92           90             < >        91            PLT          450          411          332          316          429            < >        458*          INR           --           --           --           --           --           --          1.06           < > = values in this interval not displayed.                  Lab Test        11/18/19 11/07/19 11/06/19                       1234          0630          1447          NA           137          139          134           POTASSIUM    4.2          3.5          3.4           CHLORIDE     106          109          104           CO2          25           23           17*           BUN          12           32*          41*           CR           0.99         1.42*        1.83*         ANIONGAP     6            7            13            JOSEFINA          9.5          8.8          9.9           GLC          95           113*         176*                   ECG  Sinus rhythm  Nonspecific ST and T wave abnormality  Abnormal ECG  When compared with ECG of 19-SEP-2019 11:02,  Criteria for Septal infarct are no longer Present  Nonspecific T wave abnormality now evident in Lateral leads        Lab Results   Component Value Date    WBC 10.8 11/06/2019    HGB 17.3 11/06/2019    HCT 52.3 11/06/2019     " 11/06/2019     11/18/2019    POTASSIUM 4.2 11/18/2019    CHLORIDE 106 11/18/2019    CO2 25 11/18/2019    BUN 12 11/18/2019    CR 0.99 11/18/2019    GLC 95 11/18/2019    JOSEFINA 9.5 11/18/2019    PHOS 2.3 (L) 09/20/2019    MAG 1.6 09/20/2019    ALBUMIN 4.9 11/06/2019    PROTTOTAL 8.9 (H) 11/06/2019    ALT 40 11/06/2019    AST 16 11/06/2019    ALKPHOS 102 11/06/2019    BILITOTAL 0.9 11/06/2019    INR 1.06 09/04/2019       Preop Vitals  BP Readings from Last 3 Encounters:   12/09/19 (!) 142/86   12/04/19 (!) 144/92   12/03/19 132/82    Pulse Readings from Last 3 Encounters:   12/04/19 76   12/03/19 84   11/18/19 77      Resp Readings from Last 3 Encounters:   12/09/19 16   12/04/19 16   11/18/19 14    SpO2 Readings from Last 3 Encounters:   12/09/19 95%   12/04/19 96%   12/03/19 99%      Temp Readings from Last 1 Encounters:   12/09/19 98.8  F (37.1  C) (Temporal)    Ht Readings from Last 1 Encounters:   12/09/19 1.702 m (5' 7\")      Wt Readings from Last 1 Encounters:   12/09/19 72.1 kg (159 lb)    Estimated body mass index is 24.9 kg/m  as calculated from the following:    Height as of this encounter: 1.702 m (5' 7\").    Weight as of this encounter: 72.1 kg (159 lb).       Anesthesia Plan      History & Physical Review  History and physical reviewed and following examination; no interval change.    ASA Status:  2 .    NPO Status:  > 8 hours    Plan for MAC with Intravenous induction. Maintenance will be TIVA (propofol).  Reason for MAC:  Difficulty with conscious sedation (QS) and Extreme anxiety (QS)  PONV prophylaxis:  Ondansetron (or other 5HT-3)       Postoperative Care  Postoperative pain management:  IV analgesics.      Consents  Anesthetic plan, risks, benefits and alternatives discussed with:  Patient.  Use of blood products discussed: Shayna .   .                 Rohan De La O MD                    .  "

## 2019-12-09 NOTE — ANESTHESIA POSTPROCEDURE EVALUATION
Patient: Jim Mixon    Procedure(s):  COLONOSCOPY    Diagnosis:Diverticulitis of colon [K57.32]  Diagnosis Additional Information: No value filed.    Anesthesia Type:  MAC    Note:  Anesthesia Post Evaluation    Patient location during evaluation: PACU  Patient participation: Able to fully participate in evaluation  Level of consciousness: awake  Pain management: adequate  Airway patency: patent  Cardiovascular status: acceptable  Respiratory status: acceptable  Hydration status: acceptable  PONV: none             Last vitals:  Vitals:    12/09/19 1000 12/09/19 1015 12/09/19 1044   BP: (!) 142/89 131/88 (!) 144/98   Pulse:  80    Resp: 16 16 16   Temp: 97.9  F (36.6  C)  97.3  F (36.3  C)   SpO2: 98% 97% 95%         Electronically Signed By: Rohan De La O MD  December 9, 2019  1:50 PM

## 2019-12-09 NOTE — DISCHARGE INSTRUCTIONS
GENERAL ANESTHESIA OR SEDATION ADULT DISCHARGE INSTRUCTIONS   SPECIAL PRECAUTIONS FOR 24 HOURS AFTER SURGERY    IT IS NOT UNUSUAL TO FEEL LIGHT-HEADED OR FAINT, UP TO 24 HOURS AFTER SURGERY OR WHILE TAKING PAIN MEDICATION.  IF YOU HAVE THESE SYMPTOMS; SIT FOR A FEW MINUTES BEFORE STANDING AND HAVE SOMEONE ASSIST YOU WHEN YOU GET UP TO WALK OR USE THE BATHROOM.    YOU SHOULD REST AND RELAX FOR THE NEXT 24 HOURS AND YOU MUST MAKE ARRANGEMENTS TO HAVE SOMEONE STAY WITH YOU FOR AT LEAST 24 HOURS AFTER YOUR DISCHARGE.  AVOID HAZARDOUS AND STRENUOUS ACTIVITIES.  DO NOT MAKE IMPORTANT DECISIONS FOR 24 HOURS.    DO NOT DRIVE ANY VEHICLE OR OPERATE MECHANICAL EQUIPMENT FOR 24 HOURS FOLLOWING THE END OF YOUR SURGERY.  EVEN THOUGH YOU MAY FEEL NORMAL, YOUR REACTIONS MAY BE AFFECTED BY THE MEDICATION YOU HAVE RECEIVED.    DO NOT DRINK ALCOHOLIC BEVERAGES FOR 24 HOURS FOLLOWING YOUR SURGERY.    DRINK CLEAR LIQUIDS (APPLE JUICE, GINGER ALE, 7-UP, BROTH, ETC.).  PROGRESS TO YOUR REGULAR DIET AS YOU FEEL ABLE.    YOU MAY HAVE A DRY MOUTH, A SORE THROAT, MUSCLES ACHES OR TROUBLE SLEEPING.  THESE SHOULD GO AWAY AFTER 24 HOURS.    CALL YOUR DOCTOR FOR ANY OF THE FOLLOWING:  SIGNS OF INFECTION (FEVER, GROWING TENDERNESS AT THE SURGERY SITE, A LARGE AMOUNT OF DRAINAGE OR BLEEDING, SEVERE PAIN, FOUL-SMELLING DRAINAGE, REDNESS OR SWELLING.    IT HAS BEEN OVER 8 TO 10 HOURS SINCE SURGERY AND YOU ARE STILL NOT ABLE TO URINATE (PASS WATER).       COLONOSCOPY DISCHARGE INSTRUCTIONS    You may not drive, use heavy equipment or consume alcohol for 24 hours because the drugs you were given may cause dizziness, drowsiness, forgetfulness and slower reaction time.    You may resume your regular diet and medications.    If you had a biopsy or polypectomy done, do not take aspirin, aleve (naproxen) or ibuprofen products for the next 10 days.  Tylenol (acetaminophen) is safe to take.    What to watch for:    Problems rarely occur after the exam.  It  is important for you to be aware of the early signs of a possible complication.  Call your doctor immediately if you notice any of the followin.  Unusual or persistent abdominal pain (pain that does not move around like a gas pain).    2.  Passing bright red blood from your rectum.    3.  Black or bloody stools.    4.  Temperature above 100.6 degrees F (37.5 degrees C)    If you feel this has become a medical emergency please call 911.

## 2019-12-09 NOTE — PHARMACY-ADMISSION MEDICATION HISTORY
Admission medication history interview status for this patient is complete. See [facility] admission navigator for allergy information, prior to admission medications and immunization status.     PTA meds completed by pre-admitting nurse and reviewed by pharmacy       Prior to Admission medications    Medication Sig Last Dose Taking? Auth Provider   acetaminophen (TYLENOL) 500 MG tablet Take 1,000 mg by mouth every 6 hours as needed for mild pain   Unknown, Entered By History   esomeprazole (NEXIUM) 20 MG DR capsule Take 20 mg by mouth every morning (before breakfast) Take 30-60 minutes before eating.   Unknown, Entered By History   LORazepam (ATIVAN) 1 MG tablet Take 1 mg by mouth every 6 hours as needed for anxiety   Unknown, Entered By History   magnesium citrate solution Take 296 mLs by mouth once for 1 dose   Jaimee Barrios MD   metroNIDAZOLE (FLAGYL) 500 MG tablet Take 1 tablet (500 mg) by mouth 3 times daily   Jaimee Barrios MD   neomycin (MYCIFRADIN) 500 MG tablet Take 2 tablets (1,000 mg) by mouth 4 times daily   Jaimee Barrios MD   ondansetron (ZOFRAN-ODT) 4 MG ODT tab Take 1 tablet (4 mg) by mouth every 8 hours as needed for nausea   Jaimee Barrios MD

## 2019-12-09 NOTE — ANESTHESIA CARE TRANSFER NOTE
Patient: Jim Mixon    Procedure(s):  COLONOSCOPY    Diagnosis: Diverticulitis of colon [K57.32]  Diagnosis Additional Information: No value filed.    Anesthesia Type:   MAC     Note:  Airway :Room Air  Patient transferred to:Phase II  Handoff Report: Identifed the Patient, Identified the Reponsible Provider, Reviewed the pertinent medical history, Discussed the surgical course, Reviewed Intra-OP anesthesia mangement and issues during anesthesia, Set expectations for post-procedure period and Allowed opportunity for questions and acknowledgement of understanding      Vitals: (Last set prior to Anesthesia Care Transfer)    CRNA VITALS  12/9/2019 0922 - 12/9/2019 1003      12/9/2019             NIBP:  144/86    Pulse:  86    NIBP Mean:  105    SpO2:  97 %                Electronically Signed By: FADIA Villanueva CRNA  December 9, 2019  10:03 AM

## 2019-12-10 ENCOUNTER — HOSPITAL ENCOUNTER (INPATIENT)
Facility: CLINIC | Age: 54
LOS: 3 days | Discharge: HOME | DRG: 346 | End: 2019-12-13
Attending: SURGERY | Admitting: SURGERY
Payer: COMMERCIAL

## 2019-12-10 ENCOUNTER — APPOINTMENT (OUTPATIENT)
Dept: SURGERY | Facility: PHYSICIAN GROUP | Age: 54
End: 2019-12-10
Payer: COMMERCIAL

## 2019-12-10 ENCOUNTER — ANESTHESIA (OUTPATIENT)
Dept: SURGERY | Facility: CLINIC | Age: 54
DRG: 346 | End: 2019-12-10
Payer: COMMERCIAL

## 2019-12-10 ENCOUNTER — ANESTHESIA EVENT (OUTPATIENT)
Dept: SURGERY | Facility: CLINIC | Age: 54
DRG: 346 | End: 2019-12-10
Payer: COMMERCIAL

## 2019-12-10 DIAGNOSIS — Z93.3 COLOSTOMY STATUS (H): ICD-10-CM

## 2019-12-10 DIAGNOSIS — K57.92 DIVERTICULITIS: ICD-10-CM

## 2019-12-10 DIAGNOSIS — Z98.890 S/P COLOSTOMY TAKEDOWN: Primary | ICD-10-CM

## 2019-12-10 LAB
ABO + RH BLD: NORMAL
ABO + RH BLD: NORMAL
BLD GP AB SCN SERPL QL: NORMAL
BLOOD BANK CMNT PATIENT-IMP: NORMAL
CREAT SERPL-MCNC: 0.97 MG/DL (ref 0.66–1.25)
GFR SERPL CREATININE-BSD FRML MDRD: 87 ML/MIN/{1.73_M2}
HGB BLD-MCNC: 15.5 G/DL (ref 13.3–17.7)
PLATELET # BLD AUTO: 265 10E9/L (ref 150–450)
SPECIMEN EXP DATE BLD: NORMAL

## 2019-12-10 PROCEDURE — 52005 CYSTO W/URTRL CATHJ: CPT | Performed by: UROLOGY

## 2019-12-10 PROCEDURE — 0DJD8ZZ INSPECTION OF LOWER INTESTINAL TRACT, VIA NATURAL OR ARTIFICIAL OPENING ENDOSCOPIC: ICD-10-PCS | Performed by: SURGERY

## 2019-12-10 PROCEDURE — C1758 CATHETER, URETERAL: HCPCS | Performed by: SURGERY

## 2019-12-10 PROCEDURE — 44227 LAP CLOSE ENTEROSTOMY: CPT | Performed by: SURGERY

## 2019-12-10 PROCEDURE — 36415 COLL VENOUS BLD VENIPUNCTURE: CPT | Performed by: ANESTHESIOLOGY

## 2019-12-10 PROCEDURE — 27210794 ZZH OR GENERAL SUPPLY STERILE: Performed by: SURGERY

## 2019-12-10 PROCEDURE — 25800025 ZZH RX 258: Performed by: UROLOGY

## 2019-12-10 PROCEDURE — 25000128 H RX IP 250 OP 636: Performed by: SURGERY

## 2019-12-10 PROCEDURE — 25000128 H RX IP 250 OP 636: Performed by: ANESTHESIOLOGY

## 2019-12-10 PROCEDURE — 27211024 ZZHC OR SUPPLY OTHER OPNP: Performed by: SURGERY

## 2019-12-10 PROCEDURE — 25000128 H RX IP 250 OP 636: Performed by: NURSE ANESTHETIST, CERTIFIED REGISTERED

## 2019-12-10 PROCEDURE — 86900 BLOOD TYPING SEROLOGIC ABO: CPT | Performed by: ANESTHESIOLOGY

## 2019-12-10 PROCEDURE — 86850 RBC ANTIBODY SCREEN: CPT | Performed by: ANESTHESIOLOGY

## 2019-12-10 PROCEDURE — 37000009 ZZH ANESTHESIA TECHNICAL FEE, EACH ADDTL 15 MIN: Performed by: SURGERY

## 2019-12-10 PROCEDURE — 25800030 ZZH RX IP 258 OP 636: Performed by: ANESTHESIOLOGY

## 2019-12-10 PROCEDURE — 25000128 H RX IP 250 OP 636

## 2019-12-10 PROCEDURE — 25800030 ZZH RX IP 258 OP 636: Performed by: NURSE ANESTHETIST, CERTIFIED REGISTERED

## 2019-12-10 PROCEDURE — P9041 ALBUMIN (HUMAN),5%, 50ML: HCPCS | Performed by: NURSE ANESTHETIST, CERTIFIED REGISTERED

## 2019-12-10 PROCEDURE — 36000063 ZZH SURGERY LEVEL 4 EA 15 ADDTL MIN: Performed by: SURGERY

## 2019-12-10 PROCEDURE — 0DSM0ZZ REPOSITION DESCENDING COLON, OPEN APPROACH: ICD-10-PCS | Performed by: SURGERY

## 2019-12-10 PROCEDURE — 85018 HEMOGLOBIN: CPT | Performed by: ANESTHESIOLOGY

## 2019-12-10 PROCEDURE — 88304 TISSUE EXAM BY PATHOLOGIST: CPT | Performed by: SURGERY

## 2019-12-10 PROCEDURE — 82565 ASSAY OF CREATININE: CPT | Performed by: SURGERY

## 2019-12-10 PROCEDURE — 25000125 ZZHC RX 250: Performed by: SURGERY

## 2019-12-10 PROCEDURE — 36000093 ZZH SURGERY LEVEL 4 1ST 30 MIN: Performed by: SURGERY

## 2019-12-10 PROCEDURE — 71000013 ZZH RECOVERY PHASE 1 LEVEL 1 EA ADDTL HR: Performed by: SURGERY

## 2019-12-10 PROCEDURE — 25000132 ZZH RX MED GY IP 250 OP 250 PS 637: Performed by: SURGERY

## 2019-12-10 PROCEDURE — 86901 BLOOD TYPING SEROLOGIC RH(D): CPT | Performed by: ANESTHESIOLOGY

## 2019-12-10 PROCEDURE — 25000125 ZZHC RX 250: Performed by: NURSE ANESTHETIST, CERTIFIED REGISTERED

## 2019-12-10 PROCEDURE — C1769 GUIDE WIRE: HCPCS | Performed by: SURGERY

## 2019-12-10 PROCEDURE — 36415 COLL VENOUS BLD VENIPUNCTURE: CPT | Performed by: SURGERY

## 2019-12-10 PROCEDURE — 71000012 ZZH RECOVERY PHASE 1 LEVEL 1 FIRST HR: Performed by: SURGERY

## 2019-12-10 PROCEDURE — 85049 AUTOMATED PLATELET COUNT: CPT | Performed by: SURGERY

## 2019-12-10 PROCEDURE — 25800025 ZZH RX 258: Performed by: SURGERY

## 2019-12-10 PROCEDURE — 37000008 ZZH ANESTHESIA TECHNICAL FEE, 1ST 30 MIN: Performed by: SURGERY

## 2019-12-10 PROCEDURE — 40000306 ZZH STATISTIC PRE PROC ASSESS II: Performed by: SURGERY

## 2019-12-10 PROCEDURE — 12000000 ZZH R&B MED SURG/OB

## 2019-12-10 PROCEDURE — 44227 LAP CLOSE ENTEROSTOMY: CPT | Mod: AS | Performed by: PHYSICIAN ASSISTANT

## 2019-12-10 PROCEDURE — 88304 TISSUE EXAM BY PATHOLOGIST: CPT | Mod: 26 | Performed by: SURGERY

## 2019-12-10 RX ORDER — NEOSTIGMINE METHYLSULFATE 1 MG/ML
VIAL (ML) INJECTION PRN
Status: DISCONTINUED | OUTPATIENT
Start: 2019-12-10 | End: 2019-12-10

## 2019-12-10 RX ORDER — NALOXONE HYDROCHLORIDE 0.4 MG/ML
.1-.4 INJECTION, SOLUTION INTRAMUSCULAR; INTRAVENOUS; SUBCUTANEOUS
Status: DISCONTINUED | OUTPATIENT
Start: 2019-12-10 | End: 2019-12-13 | Stop reason: HOSPADM

## 2019-12-10 RX ORDER — TAMSULOSIN HYDROCHLORIDE 0.4 MG/1
0.4 CAPSULE ORAL
Status: DISCONTINUED | OUTPATIENT
Start: 2019-12-10 | End: 2019-12-10 | Stop reason: HOSPADM

## 2019-12-10 RX ORDER — HEPARIN SODIUM 5000 [USP'U]/.5ML
5000 INJECTION, SOLUTION INTRAVENOUS; SUBCUTANEOUS
Status: COMPLETED | OUTPATIENT
Start: 2019-12-10 | End: 2019-12-10

## 2019-12-10 RX ORDER — HYDRALAZINE HYDROCHLORIDE 20 MG/ML
10 INJECTION INTRAMUSCULAR; INTRAVENOUS EVERY 4 HOURS PRN
Status: DISCONTINUED | OUTPATIENT
Start: 2019-12-10 | End: 2019-12-13 | Stop reason: HOSPADM

## 2019-12-10 RX ORDER — ACETAMINOPHEN 325 MG/1
975 TABLET ORAL ONCE
Status: COMPLETED | OUTPATIENT
Start: 2019-12-10 | End: 2019-12-10

## 2019-12-10 RX ORDER — LORAZEPAM 1 MG/1
1 TABLET ORAL EVERY 6 HOURS PRN
Status: DISCONTINUED | OUTPATIENT
Start: 2019-12-10 | End: 2019-12-13 | Stop reason: HOSPADM

## 2019-12-10 RX ORDER — ALBUTEROL SULFATE 0.83 MG/ML
2.5 SOLUTION RESPIRATORY (INHALATION) EVERY 4 HOURS PRN
Status: DISCONTINUED | OUTPATIENT
Start: 2019-12-10 | End: 2019-12-10 | Stop reason: HOSPADM

## 2019-12-10 RX ORDER — LABETALOL 20 MG/4 ML (5 MG/ML) INTRAVENOUS SYRINGE
10
Status: DISCONTINUED | OUTPATIENT
Start: 2019-12-10 | End: 2019-12-10 | Stop reason: HOSPADM

## 2019-12-10 RX ORDER — FENTANYL CITRATE 50 UG/ML
25-50 INJECTION, SOLUTION INTRAMUSCULAR; INTRAVENOUS
Status: DISCONTINUED | OUTPATIENT
Start: 2019-12-10 | End: 2019-12-10 | Stop reason: HOSPADM

## 2019-12-10 RX ORDER — PANTOPRAZOLE SODIUM 20 MG/1
20 TABLET, DELAYED RELEASE ORAL
Status: DISCONTINUED | OUTPATIENT
Start: 2019-12-11 | End: 2019-12-13 | Stop reason: HOSPADM

## 2019-12-10 RX ORDER — BUPIVACAINE HYDROCHLORIDE AND EPINEPHRINE 2.5; 5 MG/ML; UG/ML
INJECTION, SOLUTION INFILTRATION; PERINEURAL PRN
Status: DISCONTINUED | OUTPATIENT
Start: 2019-12-10 | End: 2019-12-10 | Stop reason: HOSPADM

## 2019-12-10 RX ORDER — SODIUM CHLORIDE, SODIUM LACTATE, POTASSIUM CHLORIDE, CALCIUM CHLORIDE 600; 310; 30; 20 MG/100ML; MG/100ML; MG/100ML; MG/100ML
INJECTION, SOLUTION INTRAVENOUS CONTINUOUS
Status: DISCONTINUED | OUTPATIENT
Start: 2019-12-10 | End: 2019-12-10 | Stop reason: HOSPADM

## 2019-12-10 RX ORDER — KETOROLAC TROMETHAMINE 30 MG/ML
30 INJECTION, SOLUTION INTRAMUSCULAR; INTRAVENOUS EVERY 6 HOURS
Status: COMPLETED | OUTPATIENT
Start: 2019-12-10 | End: 2019-12-11

## 2019-12-10 RX ORDER — MEPERIDINE HYDROCHLORIDE 25 MG/ML
12.5 INJECTION INTRAMUSCULAR; INTRAVENOUS; SUBCUTANEOUS
Status: DISCONTINUED | OUTPATIENT
Start: 2019-12-10 | End: 2019-12-10 | Stop reason: HOSPADM

## 2019-12-10 RX ORDER — PROPOFOL 10 MG/ML
INJECTION, EMULSION INTRAVENOUS CONTINUOUS PRN
Status: DISCONTINUED | OUTPATIENT
Start: 2019-12-10 | End: 2019-12-10

## 2019-12-10 RX ORDER — NALOXONE HYDROCHLORIDE 0.4 MG/ML
.1-.4 INJECTION, SOLUTION INTRAMUSCULAR; INTRAVENOUS; SUBCUTANEOUS
Status: DISCONTINUED | OUTPATIENT
Start: 2019-12-10 | End: 2019-12-10 | Stop reason: HOSPADM

## 2019-12-10 RX ORDER — KETAMINE HYDROCHLORIDE 10 MG/ML
INJECTION, SOLUTION INTRAMUSCULAR; INTRAVENOUS PRN
Status: DISCONTINUED | OUTPATIENT
Start: 2019-12-10 | End: 2019-12-10

## 2019-12-10 RX ORDER — PROPOFOL 10 MG/ML
INJECTION, EMULSION INTRAVENOUS PRN
Status: DISCONTINUED | OUTPATIENT
Start: 2019-12-10 | End: 2019-12-10

## 2019-12-10 RX ORDER — DEXAMETHASONE SODIUM PHOSPHATE 4 MG/ML
INJECTION, SOLUTION INTRA-ARTICULAR; INTRALESIONAL; INTRAMUSCULAR; INTRAVENOUS; SOFT TISSUE PRN
Status: DISCONTINUED | OUTPATIENT
Start: 2019-12-10 | End: 2019-12-10

## 2019-12-10 RX ORDER — ONDANSETRON 4 MG/1
4 TABLET, ORALLY DISINTEGRATING ORAL EVERY 30 MIN PRN
Status: DISCONTINUED | OUTPATIENT
Start: 2019-12-10 | End: 2019-12-10 | Stop reason: HOSPADM

## 2019-12-10 RX ORDER — LIDOCAINE 40 MG/G
CREAM TOPICAL
Status: DISCONTINUED | OUTPATIENT
Start: 2019-12-10 | End: 2019-12-13 | Stop reason: HOSPADM

## 2019-12-10 RX ORDER — HYDRALAZINE HYDROCHLORIDE 20 MG/ML
2.5-5 INJECTION INTRAMUSCULAR; INTRAVENOUS EVERY 10 MIN PRN
Status: DISCONTINUED | OUTPATIENT
Start: 2019-12-10 | End: 2019-12-10 | Stop reason: HOSPADM

## 2019-12-10 RX ORDER — ERTAPENEM 1 G/1
1 INJECTION, POWDER, LYOPHILIZED, FOR SOLUTION INTRAMUSCULAR; INTRAVENOUS EVERY 24 HOURS
Status: COMPLETED | OUTPATIENT
Start: 2019-12-11 | End: 2019-12-11

## 2019-12-10 RX ORDER — CELECOXIB 50 MG/1
50 CAPSULE ORAL
Status: DISCONTINUED | OUTPATIENT
Start: 2019-12-10 | End: 2019-12-13 | Stop reason: HOSPADM

## 2019-12-10 RX ORDER — ONDANSETRON 2 MG/ML
INJECTION INTRAMUSCULAR; INTRAVENOUS PRN
Status: DISCONTINUED | OUTPATIENT
Start: 2019-12-10 | End: 2019-12-10

## 2019-12-10 RX ORDER — LABETALOL HYDROCHLORIDE 5 MG/ML
INJECTION, SOLUTION INTRAVENOUS PRN
Status: DISCONTINUED | OUTPATIENT
Start: 2019-12-10 | End: 2019-12-10

## 2019-12-10 RX ORDER — DIMENHYDRINATE 50 MG/ML
25 INJECTION, SOLUTION INTRAMUSCULAR; INTRAVENOUS
Status: DISCONTINUED | OUTPATIENT
Start: 2019-12-10 | End: 2019-12-10 | Stop reason: HOSPADM

## 2019-12-10 RX ORDER — NALOXONE HYDROCHLORIDE 0.4 MG/ML
.1-.4 INJECTION, SOLUTION INTRAMUSCULAR; INTRAVENOUS; SUBCUTANEOUS
Status: DISCONTINUED | OUTPATIENT
Start: 2019-12-10 | End: 2019-12-10

## 2019-12-10 RX ORDER — ONDANSETRON 2 MG/ML
4 INJECTION INTRAMUSCULAR; INTRAVENOUS EVERY 6 HOURS PRN
Status: DISCONTINUED | OUTPATIENT
Start: 2019-12-10 | End: 2019-12-13 | Stop reason: HOSPADM

## 2019-12-10 RX ORDER — ONDANSETRON 2 MG/ML
4 INJECTION INTRAMUSCULAR; INTRAVENOUS EVERY 30 MIN PRN
Status: DISCONTINUED | OUTPATIENT
Start: 2019-12-10 | End: 2019-12-10 | Stop reason: HOSPADM

## 2019-12-10 RX ORDER — ACETAMINOPHEN 325 MG/1
975 TABLET ORAL EVERY 8 HOURS
Status: DISCONTINUED | OUTPATIENT
Start: 2019-12-10 | End: 2019-12-13 | Stop reason: HOSPADM

## 2019-12-10 RX ORDER — ALBUMIN, HUMAN INJ 5% 5 %
SOLUTION INTRAVENOUS CONTINUOUS PRN
Status: DISCONTINUED | OUTPATIENT
Start: 2019-12-10 | End: 2019-12-10

## 2019-12-10 RX ORDER — GLYCOPYRROLATE 0.2 MG/ML
INJECTION, SOLUTION INTRAMUSCULAR; INTRAVENOUS PRN
Status: DISCONTINUED | OUTPATIENT
Start: 2019-12-10 | End: 2019-12-10

## 2019-12-10 RX ORDER — LIDOCAINE HYDROCHLORIDE 20 MG/ML
INJECTION, SOLUTION INFILTRATION; PERINEURAL PRN
Status: DISCONTINUED | OUTPATIENT
Start: 2019-12-10 | End: 2019-12-10

## 2019-12-10 RX ORDER — ERTAPENEM 1 G/1
1 INJECTION, POWDER, LYOPHILIZED, FOR SOLUTION INTRAMUSCULAR; INTRAVENOUS
Status: COMPLETED | OUTPATIENT
Start: 2019-12-10 | End: 2019-12-10

## 2019-12-10 RX ORDER — FENTANYL CITRATE 50 UG/ML
INJECTION, SOLUTION INTRAMUSCULAR; INTRAVENOUS PRN
Status: DISCONTINUED | OUTPATIENT
Start: 2019-12-10 | End: 2019-12-10

## 2019-12-10 RX ORDER — OXYCODONE HYDROCHLORIDE 5 MG/1
5-10 TABLET ORAL EVERY 4 HOURS PRN
Status: DISCONTINUED | OUTPATIENT
Start: 2019-12-10 | End: 2019-12-13 | Stop reason: HOSPADM

## 2019-12-10 RX ADMIN — PHENYLEPHRINE HYDROCHLORIDE 50 MCG: 10 INJECTION INTRAVENOUS at 15:31

## 2019-12-10 RX ADMIN — KETAMINE HYDROCHLORIDE 30 MG: 10 INJECTION, SOLUTION INTRAMUSCULAR; INTRAVENOUS at 13:02

## 2019-12-10 RX ADMIN — PROCHLORPERAZINE EDISYLATE 10 MG: 5 INJECTION, SOLUTION INTRAMUSCULAR; INTRAVENOUS at 21:39

## 2019-12-10 RX ADMIN — FENTANYL CITRATE 50 MCG: 50 INJECTION, SOLUTION INTRAMUSCULAR; INTRAVENOUS at 19:12

## 2019-12-10 RX ADMIN — ERTAPENEM SODIUM 1 G: 1 INJECTION, POWDER, LYOPHILIZED, FOR SOLUTION INTRAMUSCULAR; INTRAVENOUS at 12:54

## 2019-12-10 RX ADMIN — GLYCOPYRROLATE 0.4 MG: 0.2 INJECTION, SOLUTION INTRAMUSCULAR; INTRAVENOUS at 17:32

## 2019-12-10 RX ADMIN — MIDAZOLAM 2 MG: 1 INJECTION INTRAMUSCULAR; INTRAVENOUS at 12:51

## 2019-12-10 RX ADMIN — HYDROMORPHONE HYDROCHLORIDE 1 MG: 1 INJECTION, SOLUTION INTRAMUSCULAR; INTRAVENOUS; SUBCUTANEOUS at 14:21

## 2019-12-10 RX ADMIN — HYDROMORPHONE HYDROCHLORIDE 0.5 MG: 1 INJECTION, SOLUTION INTRAMUSCULAR; INTRAVENOUS; SUBCUTANEOUS at 14:05

## 2019-12-10 RX ADMIN — PHENYLEPHRINE HYDROCHLORIDE 100 MCG: 10 INJECTION INTRAVENOUS at 15:46

## 2019-12-10 RX ADMIN — PHENYLEPHRINE HYDROCHLORIDE 100 MCG: 10 INJECTION INTRAVENOUS at 16:01

## 2019-12-10 RX ADMIN — SODIUM CHLORIDE, POTASSIUM CHLORIDE, SODIUM LACTATE AND CALCIUM CHLORIDE: 600; 310; 30; 20 INJECTION, SOLUTION INTRAVENOUS at 20:13

## 2019-12-10 RX ADMIN — KETAMINE HYDROCHLORIDE 20 MG: 10 INJECTION, SOLUTION INTRAMUSCULAR; INTRAVENOUS at 13:12

## 2019-12-10 RX ADMIN — FENTANYL CITRATE 50 MCG: 50 INJECTION, SOLUTION INTRAMUSCULAR; INTRAVENOUS at 19:20

## 2019-12-10 RX ADMIN — GLYCOPYRROLATE 0.2 MG: 0.2 INJECTION, SOLUTION INTRAMUSCULAR; INTRAVENOUS at 13:00

## 2019-12-10 RX ADMIN — ERTAPENEM SODIUM 1 G: 1 INJECTION, POWDER, LYOPHILIZED, FOR SOLUTION INTRAMUSCULAR; INTRAVENOUS at 12:51

## 2019-12-10 RX ADMIN — ROCURONIUM BROMIDE 10 MG: 10 INJECTION INTRAVENOUS at 16:25

## 2019-12-10 RX ADMIN — DEXAMETHASONE SODIUM PHOSPHATE 4 MG: 4 INJECTION, SOLUTION INTRA-ARTICULAR; INTRALESIONAL; INTRAMUSCULAR; INTRAVENOUS; SOFT TISSUE at 13:00

## 2019-12-10 RX ADMIN — ROCURONIUM BROMIDE 10 MG: 10 INJECTION INTRAVENOUS at 15:11

## 2019-12-10 RX ADMIN — SODIUM CHLORIDE, POTASSIUM CHLORIDE, SODIUM LACTATE AND CALCIUM CHLORIDE: 600; 310; 30; 20 INJECTION, SOLUTION INTRAVENOUS at 13:33

## 2019-12-10 RX ADMIN — ONDANSETRON HYDROCHLORIDE 4 MG: 2 INJECTION, SOLUTION INTRAVENOUS at 17:24

## 2019-12-10 RX ADMIN — DEXTROSE AND SODIUM CHLORIDE: 5; 450 INJECTION, SOLUTION INTRAVENOUS at 21:20

## 2019-12-10 RX ADMIN — ALBUMIN HUMAN: 0.05 INJECTION, SOLUTION INTRAVENOUS at 14:00

## 2019-12-10 RX ADMIN — HYDROMORPHONE HYDROCHLORIDE 0.5 MG: 1 INJECTION, SOLUTION INTRAMUSCULAR; INTRAVENOUS; SUBCUTANEOUS at 14:16

## 2019-12-10 RX ADMIN — HEPARIN SODIUM 5000 UNITS: 5000 INJECTION, SOLUTION INTRAVENOUS; SUBCUTANEOUS at 10:41

## 2019-12-10 RX ADMIN — FENTANYL CITRATE 100 MCG: 50 INJECTION, SOLUTION INTRAMUSCULAR; INTRAVENOUS at 13:00

## 2019-12-10 RX ADMIN — LABETALOL HYDROCHLORIDE 5 MG: 5 INJECTION INTRAVENOUS at 17:37

## 2019-12-10 RX ADMIN — Medication 3 MG: at 17:32

## 2019-12-10 RX ADMIN — KETOROLAC TROMETHAMINE 30 MG: 30 INJECTION, SOLUTION INTRAMUSCULAR at 21:38

## 2019-12-10 RX ADMIN — ROCURONIUM BROMIDE 20 MG: 10 INJECTION INTRAVENOUS at 14:25

## 2019-12-10 RX ADMIN — HYDROMORPHONE HYDROCHLORIDE 0.5 MG: 1 INJECTION, SOLUTION INTRAMUSCULAR; INTRAVENOUS; SUBCUTANEOUS at 19:16

## 2019-12-10 RX ADMIN — Medication: at 20:31

## 2019-12-10 RX ADMIN — PROPOFOL 150 MG: 10 INJECTION, EMULSION INTRAVENOUS at 13:00

## 2019-12-10 RX ADMIN — SODIUM CHLORIDE, POTASSIUM CHLORIDE, SODIUM LACTATE AND CALCIUM CHLORIDE: 600; 310; 30; 20 INJECTION, SOLUTION INTRAVENOUS at 11:18

## 2019-12-10 RX ADMIN — LIDOCAINE HYDROCHLORIDE 50 MG: 20 INJECTION, SOLUTION INFILTRATION; PERINEURAL at 13:00

## 2019-12-10 RX ADMIN — SODIUM CHLORIDE, POTASSIUM CHLORIDE, SODIUM LACTATE AND CALCIUM CHLORIDE: 600; 310; 30; 20 INJECTION, SOLUTION INTRAVENOUS at 16:27

## 2019-12-10 RX ADMIN — HYDROMORPHONE HYDROCHLORIDE 1 MG: 1 INJECTION, SOLUTION INTRAMUSCULAR; INTRAVENOUS; SUBCUTANEOUS at 14:55

## 2019-12-10 RX ADMIN — ROCURONIUM BROMIDE 50 MG: 10 INJECTION INTRAVENOUS at 13:00

## 2019-12-10 RX ADMIN — ROCURONIUM BROMIDE 20 MG: 10 INJECTION INTRAVENOUS at 13:45

## 2019-12-10 RX ADMIN — PROPOFOL 50 MCG/KG/MIN: 10 INJECTION, EMULSION INTRAVENOUS at 13:10

## 2019-12-10 RX ADMIN — PHENYLEPHRINE HYDROCHLORIDE 100 MCG: 10 INJECTION INTRAVENOUS at 13:56

## 2019-12-10 RX ADMIN — ROCURONIUM BROMIDE 10 MG: 10 INJECTION INTRAVENOUS at 15:42

## 2019-12-10 RX ADMIN — ONDANSETRON HYDROCHLORIDE 4 MG: 2 INJECTION, SOLUTION INTRAMUSCULAR; INTRAVENOUS at 20:20

## 2019-12-10 RX ADMIN — ACETAMINOPHEN 975 MG: 325 TABLET, FILM COATED ORAL at 10:34

## 2019-12-10 ASSESSMENT — MIFFLIN-ST. JEOR
SCORE: 1514.97
SCORE: 1542.19

## 2019-12-10 NOTE — LETTER
To whom it may concern,  Patient above, Jim Shah, will be admitted 12/10/2019 after planned elective surgery.  The surgery being performed is a laparoscopic colostomy takedown with colorectal anastomosis with preoperative ureteral stent placement.  The patient has a past history of complicated diverticulitis which failed nonsurgical management and he underwent urgent sigmoidectomy with end colostomy in September 2019.  He now is well recovered and requires takedown of the ostomy.  He has no comorbidities  Attached to this letter are discharge summaries from the past 3 months from his previous admissions, including his last operation, and progress notes from my clinic visits with the patient which all summarize important labs and imaging results.

## 2019-12-10 NOTE — ANESTHESIA PREPROCEDURE EVALUATION
Anesthesia Pre-Procedure Evaluation    Patient: Jim Mixon   MRN: [id number] : 1965          Preoperative Diagnosis: Colostomy status (H) [Z93.3]    Procedure(s):  LAPAROSCOPIC ASSISTED COLOSTOMY TAKEDOWN  INSERTION, STENT, URETER, PREOPERATIVE    Past Medical History:   Diagnosis Date     Acute kidney injury (H) 2019     Anxiety      Dehydration 2019     Diverticulitis      Gastroenteritis 2019     GERD (gastroesophageal reflux disease)      Hiatal hernia      Hypertension     stress or pain related     Noninfectious ileitis      Past Surgical History:   Procedure Laterality Date     CHOLECYSTECTOMY  2004     COLONOSCOPY N/A 2019    Procedure: COLONOSCOPY;  Surgeon: Jaimee Barrios MD;  Location: [facility] OR     COLOSTOMY       ENDOSCOPY       INSERT STENT URETER Bilateral 2019    Procedure: PREOPERATIVE BILATERAL URETERAL STENT PLACEMENT;  Surgeon: Toni Liao MD;  Location: [facility] OR     LAPAROSCOPIC ASSISTED SIGMOID COLECTOMY N/A 2019    Procedure: LAPAROSCOPY-ASSISTED, SIGMOIDCOLECTOMY WITH END COLOSTOMY;  Surgeon: Jaimee Barrios MD;  Location: [facility]     Anesthesia Evaluation     . Pt has had prior anesthetic. Type: General           ROS/MED HX    ENT/Pulmonary:  - neg pulmonary ROS     Neurologic:  - neg neurologic ROS     Cardiovascular:     (+) hypertension----. : . . . :. .       METS/Exercise Tolerance:     Hematologic:  - neg hematologic  ROS       Musculoskeletal:  - neg musculoskeletal ROS       GI/Hepatic: Comment: Diverticulitis    (+) GERD Asymptomatic on medication, hiatal hernia,       Renal/Genitourinary:     (+) chronic renal disease,       Endo:  - neg endo ROS       Psychiatric:  - neg psychiatric ROS       Infectious Disease:  - neg infectious disease ROS       Malignancy:      - no malignancy   Other:    (+) No chance of pregnancy C-spine cleared: N/A, no H/O Chronic Pain,no other significant disability                      "    Physical Exam  Normal systems: cardiovascular, pulmonary and dental    Airway   Mallampati: II  TM distance: >3 FB  Neck ROM: full    Dental     Cardiovascular       Pulmonary             Lab Results   Component Value Date    WBC 10.8 11/06/2019    HGB 15.5 12/10/2019    HCT 52.3 11/06/2019     11/06/2019     11/18/2019    POTASSIUM 4.2 11/18/2019    CHLORIDE 106 11/18/2019    CO2 25 11/18/2019    BUN 12 11/18/2019    CR 0.99 11/18/2019    GLC 95 11/18/2019    JOSEFINA 9.5 11/18/2019    PHOS 2.3 (L) 09/20/2019    MAG 1.6 09/20/2019    ALBUMIN 4.9 11/06/2019    PROTTOTAL 8.9 (H) 11/06/2019    ALT 40 11/06/2019    AST 16 11/06/2019    ALKPHOS 102 11/06/2019    BILITOTAL 0.9 11/06/2019    INR 1.06 09/04/2019       Preop Vitals  BP Readings from Last 3 Encounters:   12/10/19 (!) 125/93   12/09/19 (!) 144/98   12/04/19 (!) 144/92    Pulse Readings from Last 3 Encounters:   12/09/19 80   12/04/19 76   12/03/19 84      Resp Readings from Last 3 Encounters:   12/10/19 16   12/09/19 16   12/04/19 16    SpO2 Readings from Last 3 Encounters:   12/10/19 94%   12/09/19 95%   12/04/19 96%      Temp Readings from Last 1 Encounters:   12/10/19 99.1  F (37.3  C) (Temporal)    Ht Readings from Last 1 Encounters:   12/10/19 1.702 m (5' 7.01\")      Wt Readings from Last 1 Encounters:   12/10/19 72.1 kg (159 lb)    Estimated body mass index is 24.9 kg/m  as calculated from the following:    Height as of this encounter: 1.702 m (5' 7.01\").    Weight as of this encounter: 72.1 kg (159 lb).       Anesthesia Plan      History & Physical Review  History and physical reviewed and following examination; no interval change.    ASA Status:  2 .    NPO Status:  > 8 hours    Plan for General and ETT with Intravenous induction. Maintenance will be Balanced.    PONV prophylaxis:  Dexamethasone or Solumedrol and Ondansetron (or other 5HT-3)       Postoperative Care  Postoperative pain management:  IV analgesics.      Consents  Anesthetic " plan, risks, benefits and alternatives discussed with:  Patient.  Use of blood products discussed: Yes.   Use of blood products discussed with Patient.  Consented to blood products.  .                 Emilio Mayer MD                    .

## 2019-12-10 NOTE — OP NOTE
Procedure Date: 12/10/2019      PREOPERATIVE DIAGNOSIS:  Colostomy takedown.      POSTOPERATIVE DIAGNOSIS:  Colostomy takedown.      PROCEDURE:  Video cystoscopy, bilateral ureteral catheter placement.      SURGEON:  Toni Jang Jr., MD.      ANESTHESIA:  General endotracheal.      ESTIMATED BLOOD LOSS:  0 mL.      INDICATIONS:  The patient is a 54-year-old male who had inflammatory diverticular disease and underwent sigmoid resection and a colostomy.  He is now here for colostomy takedown and I was asked to place ureteral catheters preoperatively.      DESCRIPTION OF THE PROCEDURE:  The patient received his usual IV antibiotic therapy as ordered by Dr. Barrios.  He was taken to the operating room and placed supine on the operating table.  After adequate general endotracheal anesthesia, the patient was placed in lithotomy and his genitalia were prepped in a sterile fashion.  A #22 [place] Storz cystoscope with 30 degree lens and video were used to visualize the urethra and bladder, using water as an irrigant.  The urethra appeared normal and the prostatic fossa was unobstructed.  The bladder revealed normal mucosa and normal bilateral ureteral orifices.  Five whistle tip catheters were placed to 22 cm easily and the cystoscope removed.  An 18 Ross was placed into the bladder and 9 mL placed in the 10 mL balloon.  The ureteral catheters were brought through the drainage port of the Ross catheter and all placed to closed gravity drainage.  The ureteral catheters were secured to the Ross with a 2-0 silk suture.         TONI JANG JR, MD             D: 12/10/2019   T: 12/10/2019   MT: VERNA      Name:     LEIGH COUGHLIN   MRN:      [id number]        Account:        [id number]   :      1965           Procedure Date: 12/10/2019      Document: [id number]       cc: Jaimee Barrios MD       Primary Care Physician        Toni Jang Jr, MD

## 2019-12-11 LAB
ANION GAP SERPL CALCULATED.3IONS-SCNC: 6 MMOL/L (ref 3–14)
BUN SERPL-MCNC: 13 MG/DL (ref 7–30)
CALCIUM SERPL-MCNC: 8.5 MG/DL (ref 8.5–10.1)
CHLORIDE SERPL-SCNC: 109 MMOL/L (ref 94–109)
CO2 SERPL-SCNC: 25 MMOL/L (ref 20–32)
CREAT SERPL-MCNC: 1.05 MG/DL (ref 0.66–1.25)
ERYTHROCYTE [DISTWIDTH] IN BLOOD BY AUTOMATED COUNT: 13 % (ref 10–15)
GFR SERPL CREATININE-BSD FRML MDRD: 79 ML/MIN/{1.73_M2}
GLUCOSE BLDC GLUCOMTR-MCNC: 122 MG/DL (ref 70–99)
GLUCOSE BLDC GLUCOMTR-MCNC: 122 MG/DL (ref 70–99)
GLUCOSE SERPL-MCNC: 130 MG/DL (ref 70–99)
HCT VFR BLD AUTO: 40.5 % (ref 40–53)
HGB BLD-MCNC: 12.8 G/DL (ref 13.3–17.7)
MCH RBC QN AUTO: 28.4 PG (ref 26.5–33)
MCHC RBC AUTO-ENTMCNC: 31.6 G/DL (ref 31.5–36.5)
MCV RBC AUTO: 90 FL (ref 78–100)
PLATELET # BLD AUTO: 273 10E9/L (ref 150–450)
POTASSIUM SERPL-SCNC: 4.4 MMOL/L (ref 3.4–5.3)
RBC # BLD AUTO: 4.5 10E12/L (ref 4.4–5.9)
SODIUM SERPL-SCNC: 140 MMOL/L (ref 133–144)
WBC # BLD AUTO: 10.7 10E9/L (ref 4–11)

## 2019-12-11 PROCEDURE — 36415 COLL VENOUS BLD VENIPUNCTURE: CPT | Performed by: SURGERY

## 2019-12-11 PROCEDURE — 25000128 H RX IP 250 OP 636: Performed by: SURGERY

## 2019-12-11 PROCEDURE — 12000000 ZZH R&B MED SURG/OB

## 2019-12-11 PROCEDURE — 25000132 ZZH RX MED GY IP 250 OP 250 PS 637: Performed by: SURGERY

## 2019-12-11 PROCEDURE — 25800030 ZZH RX IP 258 OP 636: Performed by: SURGERY

## 2019-12-11 PROCEDURE — 85027 COMPLETE CBC AUTOMATED: CPT | Performed by: SURGERY

## 2019-12-11 PROCEDURE — 80048 BASIC METABOLIC PNL TOTAL CA: CPT | Performed by: SURGERY

## 2019-12-11 PROCEDURE — 00000146 ZZHCL STATISTIC GLUCOSE BY METER IP

## 2019-12-11 RX ORDER — SODIUM CHLORIDE, SODIUM LACTATE, POTASSIUM CHLORIDE, CALCIUM CHLORIDE 600; 310; 30; 20 MG/100ML; MG/100ML; MG/100ML; MG/100ML
INJECTION, SOLUTION INTRAVENOUS CONTINUOUS
Status: DISCONTINUED | OUTPATIENT
Start: 2019-12-11 | End: 2019-12-13 | Stop reason: HOSPADM

## 2019-12-11 RX ORDER — HYDROMORPHONE HYDROCHLORIDE 1 MG/ML
.3-.5 INJECTION, SOLUTION INTRAMUSCULAR; INTRAVENOUS; SUBCUTANEOUS
Status: DISCONTINUED | OUTPATIENT
Start: 2019-12-11 | End: 2019-12-13 | Stop reason: HOSPADM

## 2019-12-11 RX ADMIN — ACETAMINOPHEN 975 MG: 325 TABLET, FILM COATED ORAL at 12:27

## 2019-12-11 RX ADMIN — OXYCODONE HYDROCHLORIDE 10 MG: 5 TABLET ORAL at 22:07

## 2019-12-11 RX ADMIN — SODIUM CHLORIDE, POTASSIUM CHLORIDE, SODIUM LACTATE AND CALCIUM CHLORIDE: 600; 310; 30; 20 INJECTION, SOLUTION INTRAVENOUS at 15:33

## 2019-12-11 RX ADMIN — OXYCODONE HYDROCHLORIDE 5 MG: 5 TABLET ORAL at 18:07

## 2019-12-11 RX ADMIN — KETOROLAC TROMETHAMINE 30 MG: 30 INJECTION, SOLUTION INTRAMUSCULAR at 09:02

## 2019-12-11 RX ADMIN — KETOROLAC TROMETHAMINE 30 MG: 30 INJECTION, SOLUTION INTRAMUSCULAR at 15:50

## 2019-12-11 RX ADMIN — ERTAPENEM SODIUM 1 G: 1 INJECTION, POWDER, LYOPHILIZED, FOR SOLUTION INTRAMUSCULAR; INTRAVENOUS at 13:42

## 2019-12-11 RX ADMIN — ACETAMINOPHEN 975 MG: 325 TABLET, FILM COATED ORAL at 06:37

## 2019-12-11 RX ADMIN — PANTOPRAZOLE SODIUM 20 MG: 20 TABLET, DELAYED RELEASE ORAL at 06:37

## 2019-12-11 RX ADMIN — ENOXAPARIN SODIUM 40 MG: 40 INJECTION SUBCUTANEOUS at 15:53

## 2019-12-11 RX ADMIN — ACETAMINOPHEN 975 MG: 325 TABLET, FILM COATED ORAL at 20:40

## 2019-12-11 RX ADMIN — KETOROLAC TROMETHAMINE 30 MG: 30 INJECTION, SOLUTION INTRAMUSCULAR at 03:18

## 2019-12-11 ASSESSMENT — ACTIVITIES OF DAILY LIVING (ADL)
ADLS_ACUITY_SCORE: 15

## 2019-12-11 NOTE — ANESTHESIA POSTPROCEDURE EVALUATION
Patient: Jmi Mixon    Procedure(s):  LAPAROSCOPIC ASSISTED COLOSTOMY TAKEDOWN, Cystoscopy, Bilateral ureteral stent placement and removal  INSERTION, STENT, URETER, PREOPERATIVE    Diagnosis:Colostomy status (H) [Z93.3]  Diagnosis Additional Information: No value filed.    Anesthesia Type:  General, ETT    Note:  Anesthesia Post Evaluation    Patient location during evaluation: PACU  Patient participation: Able to fully participate in evaluation  Level of consciousness: awake  Pain management: adequate  multimodal analgesia used between 6 hours prior to anesthesia start to PACU dischargeAirway patency: patent  Cardiovascular status: acceptable  Respiratory status: acceptable  Hydration status: acceptable  PONV: none             Last vitals:  Vitals:    12/10/19 1018   BP: (!) 125/93   Resp: 16   Temp: 99.1  F (37.3  C)   SpO2: 94%         Electronically Signed By: Rohan De La O MD  December 10, 2019  6:04 PM

## 2019-12-11 NOTE — OP NOTE
Procedure Date: 12/10/2019      SURGEON:  Jaimee Barrios MD      FIRST ASSISTANT:  Andrae Orozco MD      SECOND ASSISTANT:  Desiree Flores PA-C      PREOPERATIVE DIAGNOSES:     1.  History of sigmoid colectomy and end colostomy for complicated diverticulitis.      POSTOPERATIVE DIAGNOSES:     1. History of sigmoid colectomy and end colostomy for complicated diverticulitis.      PROCEDURES:   1.  Laparoscopic takedown of end colostomy with colorectal anastomosis.   2.  Lysis of adhesions.   3.  Rigid proctoscopy by Dr. Andrae Orozco.   4.  Ureteral stent placement by Urology, Dr. Toni Liao     ANESTHESIA:  General endotracheal.      FINDINGS:  Adhesions of omentum and small bowel to abdominal wall and pelvis.  Descending colostomy taken down with anastomosis to rectal stump with 25 mm EEA stapler, 28mm anvil too small.  Negative leak test.      ESTIMATED BLOOD LOSS:  30 mL.      COMPLICATIONS:  None.      DRAINS:  None.      INDICATIONS FOR OPERATION:  This is a 54-year-old male, for whom I did an urgent sigmoid colectomy with end colostomy and Jean Paul pouch for complicated diverticulitis that had failed medical management.  This was done 09/19/2019.  We discussed pursuing takedown at 3 months postop to allow time for adhesions to soften and inflammation to decrease.  The patient requested early takedown, if possible, due to personal factors and need to move himself and his family in less than 2 months.  He had been eating well and gaining weight and overall is a good candidate for takedown.  We discussed risks of surgery, which include but are not limited to bleeding, infection, anastomotic leak, bowel injury, DVT, PE, MI, urinary infection.  The patient signed informed consent and agreed to proceed.  He had undergone colonoscopy through the stoma the day prior to operation, with no significant findings in the proximal colon.      DESCRIPTION OF PROCEDURE:  The patient was brought to the operating room, placed  on the operating table in supine position.  General anesthesia was induced.  He was then transferred to the operating table and placed in lithotomy for ureteral stents and Ross catheter, which was placed by Urology, see Dr. Liao's dictation for details.  The patient was then positioned in modified Eugene-Sousa position with feet in low-profile stirrups and care taken to place the weight on the heels, off the calves.  The right arm was tucked to the side, and the left arm was out on the arm board.  Sterile prep and drape was then done in the usual fashion after suturing the colostomy closed.  Entrance to the abdomen was gained using open technique at the umbilicus with a 12 mm Veronica trocar.  Additional 5 mm ports were placed under direct vision in the right lower quadrant and right upper quadrant.  These were placed through previous incisions. Ultimately a 3rd 5mm port was also placed in the left lower quadrant. A lysis of adhesions was then undertaken of the omentum from the anterior abdominal wall as well as the small intestine, which was adhesed down in the pelvis to the rectal stump.  These adhesions were filmy and were mostly swept away bluntly, with some adhesions managed with LigaSure and sharp division.  Small intestine was able to rotate into the upper abdomen at that point.  The Jean Paul pouch was identified with 2 blue Prolene sutures on the suture line.  The pouch was mobilized bluntly circumferentially.  The end ostomy was then assessed, and the colon was rotated to the midline by taking down lateral adhesions.  The proximal colon was also mobilized, and at this point pneumoperitoneum was reduced, and the colostomy was taken down in open fashion.  I used cautery to divide directly between the mucocutaneous junction and dissected the preperitoneal fat down to the level of fascia.  Peritoneal cavity was entered without difficulty, and the bowel was delivered into the field.  Ultimately, we resected  approximately 5 cm of the distal aspect of the colostomy, prepared nicely to the bowel for eventual colorectal anastomosis.  A 28 mm EEA stapler was then opened after using sizers in the descending ostomy.  The anvil was placed in the open end of the bowel, but would not pass easily and a serosal tear formed. Additional colon was removed with cautery and a 25mm EEA was opened, this fit nicely in the descending colon end and a pursestring using 2-0 Prolene was placed to hold the anvil.  The mesentery was cleared from the area so as not to catch the staple line, but not too much to reduce blood supply.  This portion of the bowel was then dropped back into the abdomen, and the posterior fascia from the ostomy site was closed with running 0 PDS.  Pneumoperitoneum was then reestablished and the descending colon was then assessed.  At this time, there was too much tension, and the remaining colon would not reach to the rectum easily.  Additional dissection was done by mobilizing the splenic flexure, taking down adhesions, and dividing the greater omentum off of the transverse colon to free any of those adhesions that were holding the colon up.  After additional dissection, there was adequate reach of the descending colon to the rectal stump without tension.  The EEA stapler was placed into the rectum, and the end was brought out just anterior to the transverse staple line.  The anvil was placed on the trocar, and the stapler was closed and fired after assuring there were no additional structures caught in the staple line.  The stapler was removed without difficulty.  The rectal EEA stapler portion was done by Dr. Orozco.  The donuts were inspected and were intact.  A leak test was then done by covering the anastomosis with saline and clamping the proximal bowel.  Dr. Orozco used a rigid proctoscope to insufflate air into the colon, and no air bubbles were seen around the anastomosis.  Abdomen was inspected for hemostasis,  which was adequate.  The pneumoperitoneum was then reduced.  The ports were removed, and the anterior fascia of the stoma trephine was closed with a running 0 PDS suture.  The stomal opening was pursestring closed with a 3-0 Vicryl in subcuticular fashion and packed with gauze.  The midline 12 mm port site fascia was closed with interrupted 0 Vicryl sutures.  Subcutaneous tissue was irrigated with saline, and skin of port sites was closed with 4-0 Vicryl.  Sterile dressing was applied.  The patient tolerated the procedure well.  Instrument, sponge and needle counts were correct at the end of the case.         ALICIA VILLALPANDO MD             D: 12/10/2019   T: 2019   MT: ARTURO      Name:     GOLD YASMIN MAICIO   MRN:      [id number]        Account:        [id number]   :      1965           Procedure Date: 12/10/2019      Document: [id number]

## 2019-12-11 NOTE — PROGRESS NOTES
"[facility]   General Surgery Progress Note           Assessment and Plan:   Assessment:   POD#1 s/p Procedure(s):  LAPAROSCOPIC ASSISTED COLOSTOMY TAKEDOWN, Cystoscopy, Bilateral ureteral stent placement and removal  INSERTION, STENT, URETER, PREOPERATIVE  Afebrile      Plan:   -OK to start clear liquids today  -Pain control: tylenol, toradol, oxycodone, Dilaudid PCA - will discontinue by this afternoon  -VTE prophylaxis: lovenox, PCDs  -Ertepenem 1g today for perioperative prophylaxis  -GI prophylaxis: protonix  -Remove swan today  -Increase activity as tolerated  -Wound cares: continue daily packing changes         Interval History:   Seen while up walking halls. Reports pain is present, although controlled with medication. Aggravated with activity. +flatus. Denies nausea/vomiting or bloating today. +swan. -BM.        Physical Exam:   Blood pressure 130/77, pulse 96, temperature 97.7  F (36.5  C), temperature source Oral, resp. rate 20, height 1.702 m (5' 7.01\"), weight 74.8 kg (165 lb), SpO2 93 %.    I/O last 3 completed shifts:  In: 3765 [P.O.:50; I.V.:3465]  Out: 1730 [Urine:1700; Blood:30]    Abdomen:   soft, mildly distended, tenderness noted diffusely and hypoactive bowel sounds   Inc(s) - clean, dry, intact      Wound: old stoma site packing removed and cleansed with normal saline and cotton swab. Appears clean, minimal serosanguinous drainage. Repacked with saline soaked 2x2 and covered with guaze and tape.           Data:     Recent Labs   Lab 12/11/19  0603 12/10/19  2216 12/10/19  1051   WBC 10.7  --   --    HGB 12.8*  --  15.5   HCT 40.5  --   --    MCV 90  --   --     265  --      Recent Labs   Lab 12/11/19  0603 12/10/19  2216     --    POTASSIUM 4.4  --    CHLORIDE 109  --    CO2 25  --    ANIONGAP 6  --    *  --    BUN 13  --    CR 1.05 0.97   GFRESTIMATED 79 87   GFRESTBLACK >90 >90   JOSEFINA 8.5  --        Rosy Rush PA-C     Seen and agree  Will stop " PCA this afternoon. Has multimodal therapy available  Ok for full liquids  Jaimee Barrios MD

## 2019-12-11 NOTE — PLAN OF CARE
Ambulatory Status: Patient did not get out of bed this shift  Wife at bedside  Capnography in place:WNL  Pain: C/O abd pain,throat pain and redness. relieved by Dilaudid PCA,scheduled Toradol, and Tylenol  Resp: LS clear, sats 94% on 3L NC  GI: Continued to c/o of nausea after Zofran. Small amount of emesis. Relieved after Compazine. Later in shift able to tolerate clear liquids.Full liquid diet ordered. No bowel sounds  Not  Passing flatus..  : Ross draining clear tete urine  Skin: lab sites covered with steri strips, incision in belly button drained small amount of serosanguinous drainage, covered with 2x2,   Tx: Invanz  Consults: Surgery  Disposition: TBD

## 2019-12-11 NOTE — PLAN OF CARE
Pt. Persistently C/o sore throat. Visual check shows reddness/abrasion on the roof of the back of the throat.Also noted a bruise to the right of the uvula on the palate. Dr. Clementine MD updated and also did visual check of pt.'s throat and cofirms this RN assessment. Will continue ice chips, throat lozenges and pain meds prn.

## 2019-12-11 NOTE — CONSULTS
CTS identifies pt as high risk due to PORSHA ELEVATED.  Pt is here for an elective TD surgery. No gap in care. No hand off will be done.    CM will continue to follow patient for any additional discharge needs.     Lily Omer RN BSN   Inpatient Care Coordination  [facility]  [phone]

## 2019-12-11 NOTE — ANESTHESIA CARE TRANSFER NOTE
Patient: Jim Mixon    Procedure(s):  LAPAROSCOPIC ASSISTED COLOSTOMY TAKEDOWN, Cystoscopy, Bilateral ureteral stent placement and removal  INSERTION, STENT, URETER, PREOPERATIVE    Diagnosis: Colostomy status (H) [Z93.3]  Diagnosis Additional Information: No value filed.    Anesthesia Type:   General, ETT     Note:  Airway :Face Mask  Patient transferred to:PACU  Comments: Pt to PACU, VSS, report to RNHandoff Report: Identifed the Patient, Identified the Reponsible Provider, Reviewed the pertinent medical history, Discussed the surgical course, Reviewed Intra-OP anesthesia mangement and issues during anesthesia, Set expectations for post-procedure period and Allowed opportunity for questions and acknowledgement of understanding      Vitals: (Last set prior to Anesthesia Care Transfer)    CRNA VITALS  12/10/2019 1724 - 12/10/2019 1803      12/10/2019             Pulse:  88    SpO2:  96 %    Resp Rate (observed):  11                Electronically Signed By: FADIA Stafford CRNA  December 10, 2019  6:03 PM

## 2019-12-11 NOTE — PLAN OF CARE
Pt remains admitted for colostomy TD  A/O x4  Pain in abdomen reported, PCA pump in place  No nausea reported  On IV invanz, pt reported swelling of his mouth, MD paged  PCA in place 0.1/10/1.8  Lap sites clean dry and intact  Up x1 with gaitbelt  Advanced to full liquids, tolerating fairly   O2 weaned to RA  Discharge pending  Will continue to monitor

## 2019-12-12 LAB
ANION GAP SERPL CALCULATED.3IONS-SCNC: 3 MMOL/L (ref 3–14)
BUN SERPL-MCNC: 11 MG/DL (ref 7–30)
CALCIUM SERPL-MCNC: 8.6 MG/DL (ref 8.5–10.1)
CHLORIDE SERPL-SCNC: 109 MMOL/L (ref 94–109)
CO2 SERPL-SCNC: 28 MMOL/L (ref 20–32)
COPATH REPORT: NORMAL
CREAT SERPL-MCNC: 1.09 MG/DL (ref 0.66–1.25)
ERYTHROCYTE [DISTWIDTH] IN BLOOD BY AUTOMATED COUNT: 13.2 % (ref 10–15)
GFR SERPL CREATININE-BSD FRML MDRD: 76 ML/MIN/{1.73_M2}
GLUCOSE SERPL-MCNC: 102 MG/DL (ref 70–99)
HCT VFR BLD AUTO: 38.7 % (ref 40–53)
HGB BLD-MCNC: 12.2 G/DL (ref 13.3–17.7)
MCH RBC QN AUTO: 28.7 PG (ref 26.5–33)
MCHC RBC AUTO-ENTMCNC: 31.5 G/DL (ref 31.5–36.5)
MCV RBC AUTO: 91 FL (ref 78–100)
PLATELET # BLD AUTO: 223 10E9/L (ref 150–450)
POTASSIUM SERPL-SCNC: 4.3 MMOL/L (ref 3.4–5.3)
RBC # BLD AUTO: 4.25 10E12/L (ref 4.4–5.9)
SODIUM SERPL-SCNC: 140 MMOL/L (ref 133–144)
WBC # BLD AUTO: 7.3 10E9/L (ref 4–11)

## 2019-12-12 PROCEDURE — 85027 COMPLETE CBC AUTOMATED: CPT | Performed by: SURGERY

## 2019-12-12 PROCEDURE — 25000132 ZZH RX MED GY IP 250 OP 250 PS 637: Performed by: SURGERY

## 2019-12-12 PROCEDURE — 80048 BASIC METABOLIC PNL TOTAL CA: CPT | Performed by: SURGERY

## 2019-12-12 PROCEDURE — 36415 COLL VENOUS BLD VENIPUNCTURE: CPT | Performed by: SURGERY

## 2019-12-12 PROCEDURE — 25000128 H RX IP 250 OP 636: Performed by: SURGERY

## 2019-12-12 PROCEDURE — 12000000 ZZH R&B MED SURG/OB

## 2019-12-12 RX ORDER — AMOXICILLIN 250 MG
1 CAPSULE ORAL 2 TIMES DAILY
Status: DISCONTINUED | OUTPATIENT
Start: 2019-12-12 | End: 2019-12-13 | Stop reason: HOSPADM

## 2019-12-12 RX ORDER — IBUPROFEN 600 MG/1
600 TABLET, FILM COATED ORAL EVERY 6 HOURS PRN
Status: DISCONTINUED | OUTPATIENT
Start: 2019-12-12 | End: 2019-12-13 | Stop reason: HOSPADM

## 2019-12-12 RX ADMIN — IBUPROFEN 600 MG: 600 TABLET, FILM COATED ORAL at 15:33

## 2019-12-12 RX ADMIN — ENOXAPARIN SODIUM 40 MG: 40 INJECTION SUBCUTANEOUS at 15:33

## 2019-12-12 RX ADMIN — OXYCODONE HYDROCHLORIDE 10 MG: 5 TABLET ORAL at 08:10

## 2019-12-12 RX ADMIN — PANTOPRAZOLE SODIUM 20 MG: 20 TABLET, DELAYED RELEASE ORAL at 06:52

## 2019-12-12 RX ADMIN — OXYCODONE HYDROCHLORIDE 10 MG: 5 TABLET ORAL at 22:01

## 2019-12-12 RX ADMIN — ACETAMINOPHEN 975 MG: 325 TABLET, FILM COATED ORAL at 05:39

## 2019-12-12 RX ADMIN — HYDROMORPHONE HYDROCHLORIDE 0.5 MG: 1 INJECTION, SOLUTION INTRAMUSCULAR; INTRAVENOUS; SUBCUTANEOUS at 10:48

## 2019-12-12 RX ADMIN — ACETAMINOPHEN 975 MG: 325 TABLET, FILM COATED ORAL at 21:07

## 2019-12-12 RX ADMIN — OXYCODONE HYDROCHLORIDE 10 MG: 5 TABLET ORAL at 02:18

## 2019-12-12 RX ADMIN — OXYCODONE HYDROCHLORIDE 10 MG: 5 TABLET ORAL at 13:19

## 2019-12-12 RX ADMIN — ACETAMINOPHEN 975 MG: 325 TABLET, FILM COATED ORAL at 13:18

## 2019-12-12 RX ADMIN — OXYCODONE HYDROCHLORIDE 10 MG: 5 TABLET ORAL at 17:40

## 2019-12-12 ASSESSMENT — ACTIVITIES OF DAILY LIVING (ADL)
ADLS_ACUITY_SCORE: 13
ADLS_ACUITY_SCORE: 15
ADLS_ACUITY_SCORE: 13
ADLS_ACUITY_SCORE: 13

## 2019-12-12 NOTE — PLAN OF CARE
"Vitals: BP (!) 146/82 (BP Location: Right arm)   Pulse 78   Temp 98.4  F (36.9  C) (Axillary)   Resp 20   Ht 1.702 m (5' 7.01\")   Wt 74.8 kg (165 lb)   SpO2 95%   BMI 25.84 kg/m    Situation/Status: Laparoscopic takedown of end colostomy   Neuro: A/O x 4  Pain: Rated pain between 2-6/10, gave prn 10 mg oxy x 2, 0.5 mg Dilaudid IV x 1 for breakthrough, on scheduled Tylenol, with effective relief, ice/heat offered  Activity:Indep  Diet: Low fiber diet  Lungs: OB-IPI-Txuuc  GI/: No nausea/vomiting, + Flautus, BS x4.  Dressings: CDI, surgery changed the packing and dressing today.  Lines/drains: IVF 50 ml/hr  Plan: discharge home 1-2days once tolerating diet and pain is controlled with oral meds. Continue to monitor per POC.    "

## 2019-12-12 NOTE — PLAN OF CARE
BP's elevated. Afebrile.  Reports pain, oxy given.  Denies N/V.  BS hypo, +flatus. Reports 1 loose stool w/a little blood.  Reports voiding ok.  IVF infusing.  Up independently.  Diet: Fulls, tolerating.  Discharge: TBD

## 2019-12-12 NOTE — PROGRESS NOTES
"[facility]   General Surgery Progress Note           Assessment and Plan:   Assessment:   POD#2 s/p Procedure(s):  1.  Laparoscopic takedown of end colostomy with colorectal anastomosis.  2.  Lysis of adhesions.  3.  Rigid proctoscopy by Dr. Andrae Orozco.  4.  Ureteral stent placement by Urology, Dr. Toni Liao  Video cystoscopy, bilateral ureteral catheter placement  Postoperative ileus as expected - resolved      Plan:   -Diet: advance to low fiber  -Pain control: tylenol, ibuprofen, oxycodone  -VTE prophylaxis: lovenox, PCDs  -GI prophylaxis: protonix  -Increase activity as tolerated  -Wound cares: continue daily packing changes  -Disposition: Possible discharge in 1-2 days when meets criteria         Interval History:   Sitting up in bed, doing well. Denies pain. Pain controled with oral medications. He has been up walking, voiding independently and having BMs. He had some bloody stool initially and this has mostly cleared out.          Physical Exam:   Blood pressure (!) 146/82, pulse 78, temperature 98.4  F (36.9  C), temperature source Axillary, resp. rate 20, height 1.702 m (5' 7.01\"), weight 74.8 kg (165 lb), SpO2 95 %.    I/O last 3 completed shifts:  In: 1395 [P.O.:200; I.V.:1195]  Out: 470 [Urine:470]    Abdomen:   soft, mildly distended, +mild tenderness noted diffusely and +BS  Inc(s) - clean, dry, intact with steristrips     Wound: old stoma site packing removed and cleansed with MicroKlens spray. Appears clean, minimal serosanguinous drainage. Repacked with moist 2x2 and covered with guaze and tape.           Data:     Recent Labs   Lab 12/12/19  0735 12/11/19  0603 12/10/19  2216 12/10/19  1051   WBC 7.3 10.7  --   --    HGB 12.2* 12.8*  --  15.5   HCT 38.7* 40.5  --   --    MCV 91 90  --   --     273 265  --      Recent Labs   Lab 12/12/19  0735 12/11/19  0603 12/10/19  2216    140  --    POTASSIUM 4.3 4.4  --    CHLORIDE 109 109  --    CO2 28 25  --    ANIONGAP 3 6  " --    * 130*  --    BUN 11 13  --    CR 1.09 1.05 0.97   GFRESTIMATED 76 79 87   GFRESTBLACK 88 >90 >90   JOSEFINA 8.6 8.5  --        Atul Merchant PA-C     Seen and agree. First couple loose BMs with blood discussed this is normal. Continue to monitor.  AF and labs all normal.   Low fiber diet  Possibly home tomorrow.   Needs to do packing with wife before home  Jaimee Barrios MD

## 2019-12-12 NOTE — CONSULTS
CLINICAL NUTRITION SERVICES  -  ASSESSMENT NOTE      Recommendations Ordered by Registered Dietitian (RD):   Diet advancement per surgery  Boost breeze 2 times daily   MALNUTRITION:  % Weight Loss:  Weight loss does not meet criteria for malnutrition, weight back to baseline  % Intake:  Decreased intake does not meet criteria for malnutrition   Subcutaneous Fat Loss:  Orbital region mild depletion and Upper arm region mild - moderate depletion  Muscle Loss:  Temporal region depletion, Clavicle bone region depletion, Acromion bone region depletion, Patellar region depletion, Anterior thigh region depletion and Posterior calf region depletion: mild - moderate   Fluid Retention:  None noted    Malnutrition Diagnosis: Non-Severe malnutrition  In Context of:  Acute illness or injury  Chronic illness or disease          REASON FOR ASSESSMENT  Jim Mixon is a 54 year old male seen by Registered Dietitian for Provider Order - Nutrition Education - Low residue diet     History of sigmoid colectomy and end colostomy for complicated diverticulitis. Here for laparoscopic takedown of end colostomy with colorectal anastomosis and a ureteral stent placement 12/10/2019.     NUTRITION HISTORY  Information obtained from pt:  Was very hungry prior to admission  4-5 meals a day   Lat time on a low fiber diet pt had very bad constipation, recommend prune juice   Usually consumes a low fiber diet      CURRENT NUTRITION ORDERS  Diet Order:     Full liquid        Current Intake/Tolerance:  Information obtained from pt:  Full liquids are going well   12/11 ate 2 meals, first meal 50%, second 100%  Fair appetite   Throat is sore and has trouble with cold liquids          NUTRITION FOCUSED PHYSICAL ASSESSMENT FOR DIAGNOSING MALNUTRITION)  Yes               Observed:     Muscle wasting (refer to documentation in Malnutrition section) and Subcutaneous fat loss (refer to documentation in Malnutrition section)    Obtained from  "Chart/Interdisciplinary Team:  Pt is having loose stools 1-2 times a day    ANTHROPOMETRICS  Height: 5' 7.008\"  Weight: 165 lbs 0 oz  Body mass index is 25.84 kg/m .  Weight Status:  Overweight BMI 25-29.9  IBW: 148 lbs  % IBW: 111%  Weight History:  Wt Readings from Last 10 Encounters:   12/10/19 74.8 kg (165 lb)   12/09/19 72.1 kg (159 lb)   12/04/19 74.8 kg (165 lb)   12/03/19 74.8 kg (165 lb)   11/18/19 75.3 kg (166 lb)   11/06/19 68.5 kg (151 lb)   10/07/19 67.6 kg (149 lb)   09/23/19 67.9 kg (149 lb 11.2 oz)   09/18/19 76.7 kg (169 lb)   09/16/19 76.7 kg (169 lb)   Pt reports losing 20 pounds in September-October and regaining 12 in November  Wt back to baseline     LABS  Labs reviewed    MEDICATIONS  Medications reviewed      ASSESSED NUTRITION NEEDS PER APPROVED PRACTICE GUIDELINES:    Dosing Weight 74.8  Estimated Energy Needs: 4927-6823+ kcals (25-30+ Kcal/Kg)  Justification: post-op  Estimated Protein Needs: 90 - 112 grams protein (1.2-1.5 g pro/Kg)  Justification: post-op and preservation of lean body mass  Estimated Fluid Needs: 1870 - 2244 mL (1 mL/Kcal) or per MD  Justification: maintenance      NUTRITION DIAGNOSIS:  Inadequate protein-energy intake related to being on a restricted diet order of full liquids postop as evidenced by diet less than or equal to full liquids since admission 12/10/19      NUTRITION INTERVENTIONS  Recommendations / Nutrition Prescription  Diet advancement per surgery  Boost breeze 2 times daily      Implementation  Nutrition education: Provided education on Low Fiber Diet     Assessed learning needs, learning preferences, and willingness to learn    Nutrition Education (Content):  a. Provided handout on Fiber-Restricted (13 gram) Nutrition therapy    b. Discussed Low fiber foods and high fiber foods to avoid     Nutrition Education (Application):  a. Discussed eating habits and recommended alternative food choices    Patient verbalizes understanding of diet by engaging in " conversation and asking questions      Anticipate good compliance    Diet Education - refer to Education Flowsheet      Medical Food Supplement: 2 boost breeze daily  Collaboration and Referral of Nutrition care: spoke with care team at rounds about pt needs        Nutrition Goals  Drink 2 boost breeze daily, 10:00 am & 2:00 pm  Consume >/= 75% of meals      MONITORING AND EVALUATION:  Progress towards goals will be monitored and evaluated per protocol and Practice Guidelines      Margoth Corrie  Dietetic Intern

## 2019-12-13 ENCOUNTER — TELEPHONE (OUTPATIENT)
Dept: SURGERY | Facility: CLINIC | Age: 54
End: 2019-12-13

## 2019-12-13 VITALS
OXYGEN SATURATION: 96 % | HEIGHT: 67 IN | BODY MASS INDEX: 25.9 KG/M2 | WEIGHT: 165 LBS | DIASTOLIC BLOOD PRESSURE: 94 MMHG | RESPIRATION RATE: 16 BRPM | TEMPERATURE: 97.7 F | SYSTOLIC BLOOD PRESSURE: 151 MMHG | HEART RATE: 64 BPM

## 2019-12-13 LAB
ANION GAP SERPL CALCULATED.3IONS-SCNC: 6 MMOL/L (ref 3–14)
BUN SERPL-MCNC: 14 MG/DL (ref 7–30)
CALCIUM SERPL-MCNC: 8.7 MG/DL (ref 8.5–10.1)
CHLORIDE SERPL-SCNC: 108 MMOL/L (ref 94–109)
CO2 SERPL-SCNC: 26 MMOL/L (ref 20–32)
CREAT SERPL-MCNC: 0.99 MG/DL (ref 0.66–1.25)
ERYTHROCYTE [DISTWIDTH] IN BLOOD BY AUTOMATED COUNT: 12.8 % (ref 10–15)
GFR SERPL CREATININE-BSD FRML MDRD: 85 ML/MIN/{1.73_M2}
GLUCOSE SERPL-MCNC: 100 MG/DL (ref 70–99)
HCT VFR BLD AUTO: 38.6 % (ref 40–53)
HGB BLD-MCNC: 12.4 G/DL (ref 13.3–17.7)
MCH RBC QN AUTO: 28.7 PG (ref 26.5–33)
MCHC RBC AUTO-ENTMCNC: 32.1 G/DL (ref 31.5–36.5)
MCV RBC AUTO: 89 FL (ref 78–100)
PLATELET # BLD AUTO: 248 10E9/L (ref 150–450)
POTASSIUM SERPL-SCNC: 3.8 MMOL/L (ref 3.4–5.3)
RBC # BLD AUTO: 4.32 10E12/L (ref 4.4–5.9)
SODIUM SERPL-SCNC: 140 MMOL/L (ref 133–144)
WBC # BLD AUTO: 6.4 10E9/L (ref 4–11)

## 2019-12-13 PROCEDURE — 85027 COMPLETE CBC AUTOMATED: CPT | Performed by: SURGERY

## 2019-12-13 PROCEDURE — 80048 BASIC METABOLIC PNL TOTAL CA: CPT | Performed by: SURGERY

## 2019-12-13 PROCEDURE — 36415 COLL VENOUS BLD VENIPUNCTURE: CPT | Performed by: SURGERY

## 2019-12-13 PROCEDURE — 25000132 ZZH RX MED GY IP 250 OP 250 PS 637: Performed by: SURGERY

## 2019-12-13 RX ORDER — OXYCODONE HYDROCHLORIDE 5 MG/1
5-10 TABLET ORAL EVERY 4 HOURS PRN
Qty: 20 TABLET | Refills: 0 | Status: SHIPPED | OUTPATIENT
Start: 2019-12-13 | End: 2019-12-16

## 2019-12-13 RX ADMIN — OXYCODONE HYDROCHLORIDE 10 MG: 5 TABLET ORAL at 08:54

## 2019-12-13 RX ADMIN — PANTOPRAZOLE SODIUM 20 MG: 20 TABLET, DELAYED RELEASE ORAL at 06:32

## 2019-12-13 RX ADMIN — OXYCODONE HYDROCHLORIDE 10 MG: 5 TABLET ORAL at 03:50

## 2019-12-13 RX ADMIN — ACETAMINOPHEN 975 MG: 325 TABLET, FILM COATED ORAL at 05:12

## 2019-12-13 RX ADMIN — ACETAMINOPHEN 975 MG: 325 TABLET, FILM COATED ORAL at 12:01

## 2019-12-13 ASSESSMENT — ACTIVITIES OF DAILY LIVING (ADL)
ADLS_ACUITY_SCORE: 13

## 2019-12-13 NOTE — PROGRESS NOTES
"[facility]   General Surgery Progress Note           Assessment and Plan:   Assessment:   POD#3 s/p Procedure(s):  1.  Laparoscopic takedown of end colostomy with colorectal anastomosis.  2.  Lysis of adhesions.  3.  Rigid proctoscopy by Dr. Andrae Orozco.  4.  Ureteral stent placement by Urology, Dr. Toni Liao  Video cystoscopy, bilateral ureteral catheter placement  Postoperative ileus as expected - resolved  Afebrile      Plan:   -OK to DC today.   -DC Rx: oxycodone.    -OTC bowel program prn.    -RTC next week for wound check, and again in 2-3 weeks for postop appt.    -Discharge instructions were reviewed with the patient in detail.  All his questions/concerns were addressed.  He is aware that a printed copy of instructions will be given to him upon discharge.         Interval History:   Comfortable up in room, wife at bedside. Reports pain is well controlled with medication. Tolerating diet, +flatus, +BM, denies any further blood. Voiding independently. Anxious about wound cares over the weekend, although ready to discharge. Instructions reviewed.        Physical Exam:   Blood pressure (!) 159/96, pulse 64, temperature 97.7  F (36.5  C), temperature source Oral, resp. rate 16, height 1.702 m (5' 7.01\"), weight 74.8 kg (165 lb), SpO2 96 %.    I/O last 3 completed shifts:  In: 700 [P.O.:700]  Out: -     Abdomen:   soft, mildly distended, +mild tenderness noted diffusely and +BS  Inc(s) - clean, dry, intact with steristrips     Wound: old stoma site packing removed and cleansed with MicroKlens spray. Appears clean, minimal serosanguinous drainage. Repacked with moist 2x2 and covered with guaze and tape. Education given.           Data:     Recent Labs   Lab 12/13/19  0753 12/12/19  0735 12/11/19  0603   WBC 6.4 7.3 10.7   HGB 12.4* 12.2* 12.8*   HCT 38.6* 38.7* 40.5   MCV 89 91 90    223 273     Recent Labs   Lab 12/13/19  0753 12/12/19  0735 12/11/19  0603    140 140   POTASSIUM " 3.8 4.3 4.4   CHLORIDE 108 109 109   CO2 26 28 25   ANIONGAP 6 3 6   * 102* 130*   BUN 14 11 13   CR 0.99 1.09 1.05   GFRESTIMATED 85 76 79   GFRESTBLACK >90 88 >90   JOSEFINA 8.7 8.6 8.5       Rosy Rush PA-C

## 2019-12-13 NOTE — DISCHARGE INSTRUCTIONS
HOME CARE FOLLOWING ABDOMINAL SURGERY  PARRIS Guzman, MAJOR Collins R. O Donnell, J. Shaheen  Special instructions for Jim Mixon:  --Continue to pack the wound daily, follow up in clinic next week  [phone]       INCISIONAL CARE:  Replace the bandage over your incision (or incisions) until all drainage stops, or if more comfortable to have in place.  If present, leave the steri-strips (white paper tapes) in place for 14 days after surgery.  If you have staples in your incision at the time of discharge, they will be removed at your follow-up appointment.  If Dermabond (a type of skin glue) is present, leave in place until it wears/flakes off.     1. Remove dressing/tape  2. Remove 2x2 packing  3. Cleanse with MicroKlenz spray, pat dry   4. Pack with the corner of new 2x2  5. Apply new 2x2 over top, tape in place    BATHING:  Avoid baths for 1 week after surgery.  Showers are okay.  You may wash your hair at any time.  Gently pat your incision dry after bathing.    ACTIVITY:  Light Activity -- you may immediately be up and about as tolerated.  Driving -- you may drive when comfortable and off narcotic pain medications.  Light Work -- resume when comfortable off pain medications.  (If you can drive, you probably can work.)  Strenuous Work/Activity -- limit lifting to 20 pounds for 4 weeks.  Then, progressively increase with time.  Active Sports (running, biking, etc.) -- cautiously resume after 6 weeks.    DISCOMFORT:  Use pain medications as prescribed by your surgeon.  Take the pain medication with some food, when possible, to minimize side effects.  Expect gradual improvement.    DIET:  Return to diet you were on before surgery, unless you are given specific diet instructions.  Drink plenty of fluids.  While taking pain medications, increase dietary fiber or add a fiber supplementation like Metamucil or Citrucel to help prevent constipation - a possible side effect of pain  medications.    NAUSEA:  If nauseated from the anesthetic/pain meds; rest in bed, get up cautiously with assistance, and drink clear liquids (juice, tea, broth).    RETURN APPOINTMENT:  Schedule a follow-up visit 2-3 weeks after discharge from the hospital.  Office Phone:  [phone]     CONTACT US IF THE FOLLOWING DEVELOPS:   1. A fever that is above 101     2. If there is a large amount of drainage, bleeding, or swelling.   3. Severe pain that is not relieved by your prescription.   4. Drainage that is thick, cloudy, yellow, green or white.   5. Any other questions not answered by  Frequently Asked Questions  sheet.      FREQUENTLY ASKED QUESTIONS:    Q:  How should my incision look?    A:  Normally your incision will appear slightly swollen with light redness directly along the incision itself as it heals.  It may feel like a bump or ridge as the healing/scarring happens, and over time (3-4 months) this bump or ridge feeling should slowly go away.  In general, clear or pink watery drainage can be normal at first as your incision heals, but should decrease over time.    Q:  How do I know if my incision is infected?  A:  Look at your incision for signs of infection, like redness around the incision spreading to surrounding skin, or drainage of cloudy or foul-smelling drainage.  If you feel warm, check your temperature to see if you are running a fever.    **If any of these things occur, please notify the nurse at our office.  We may need you to come into the office for an incision check.      Q:  How do I take care of my incision?  A:  If you have a dressing in place - Starting the day after surgery, replace the dressing 1-2 times a day until there is no further drainage from the incision.  At that time, a dressing is no longer needed.  Try to minimize tape on the skin if irritation is occurring at the tape sites.  If you have significant irritation from tape on the skin, please call the office to discuss other  method of dressing your incision.    Small pieces of tape called  steri-strips  may be present directly overlying your incision; these may be removed 10 days after surgery unless otherwise specified by your surgeon.  If these tapes start to loosen at the ends, you may trim them back until they fall off or are removed.    A:  If you had  Dermabond  tissue glue used as a dressing (this causes your incision to look shiny with a clear covering over it) - This type of dressing wears off with time and does not require more dressings over the top unless it is draining around the glue as it wears off.  Do not apply ointments or lotions over the incisions until the glue has completely worn off.    Q:  There is a piece of tape or a sticky  lead  still on my skin.  Can I remove this?  A:  Sometimes the sticky  leads  used for monitoring during surgery or for evaluation in the emergency department are not all removed while you are in the hospital.  These sometimes have a tab or metal dot on them.  You can easily remove these on your own, like taking off a band-aid.  If there is a gel substance under the  lead , simply wipe/clean it off with a washcloth or paper towel.      Q:  What can I do to minimize constipation (very hard stools, or lack of stools)?  A:  Stay well hydrated.  Increase your dietary fiber intake or take a fiber supplement -with plenty of water.  Walk around frequently.  You may consider an over-the-counter stool-softener.  Your Pharmacist can assist you with choosing one that is stocked at your pharmacy.  Constipation is also one of the most common side effects of pain medication.  If you are using pain medication, be pro-active and try to PREVENT problems with constipation by taking the steps above BEFORE constipation becomes a problem.    Q:  What do I do if I need more pain medications?  A:  Call the office to receive refills.  Be aware that certain pain meds cannot be called into a pharmacy and actually  require a paper prescription.  A change may be made in your pain med as you progress thru your recovery period or if you have side effects to certain meds.    --Pain meds are NOT refilled after 5pm on weekdays, and NOT AT ALL on the weekends, so please look ahead to prevent problems.      Q:  Why am I having a hard time sleeping now that I am at home?  A:  Many medications you receive while you are in the hospital can impact your sleep for a number of days after your surgery/hospitalization.  Decreased level of activity and naps during the day may also make sleeping at night difficult.  Try to minimize day-time naps, and get up frequently during the day to walk around your home during your recovery time.  Sleep aides may be of some help, but are not recommended for long-term use.      Q:  I am having some back discomfort.  What should I do?  A:  This may be related to certain positioning that was required for your surgery, extended periods of time in bed, or other changes in your overall activity level.  You may try ice, heat, acetaminophen, or ibuprofen to treat this temporarily.  Note that many pain medications have acetaminophen in them and would state this on the prescription bottle.  Be sure not to exceed the maximum of 4000mg per day of acetaminophen.     **If the pain you are having does not resolve, is severe, or is a flare of back pain you have had on other occasions prior to surgery, please contact your primary physician for further recommendations or for an appointment to be examined at their office.    Q:  Why am I having headaches?  A:  Headaches can be caused by many things:  caffeine withdrawal, use of pain meds, dehydration, high blood pressure, lack of sleep, over-activity/exhaustion, flare-up of usual migraine headaches.  If you feel this is related to muscle tension (a band-like feeling around the head, or a pressure at the low-back of the head) you may try ice or heat to this area.  You may need  to drink more fluids (try electrolyte drink like Gatorade), rest, or take your usual migraine medications.   **If your headaches do not resolve, worsen, are accompanied by other symptoms, or if your blood pressure is high, please call your primary physician for recommendation and/or examination.    Q:  I am unable to urinate.  What do I do?  A:  A small percentage of people can have difficulty urinating initially after surgery.  This includes being able to urinate only a very small amount at a time and feeling discomfort or pressure in the very low abdomen.  This is called  urinary retention , and is actually an urgent situation.  Proceed to your nearest Emergency department for evaluation (not an Urgent Care Center).  Sometimes the bladder does not work correctly after certain medications you receive during surgery, or related to certain procedures.  You may need to have a catheter placed until your bladder recovers.  When planning to go to an Emergency department, it may help to call the ER to let them know you are coming in for this problem after a surgery.  This may help you get in quicker to be evaluated.  **If you have symptoms of a urinary tract infection, please contact your primary physician for the proper evaluation and treatment.          If you have other questions, please call the office Monday thru Friday between 8am and 5pm to discuss with the nurse or physician assistant.  #[phone]    There is a surgeon ON CALL on weekday evenings and over the weekend in case of urgent need only, and may be contacted at the same number.    If you are having an emergency, call 911 or proceed to your nearest emergency department.

## 2019-12-13 NOTE — PLAN OF CARE
Pt to D/C to home.  Pt provided with d/c instructions, including new medications, when medications were last given, and when to take them again.  Pt also informed to f/u with primary in 7 days and surgery next week.  Pt verbalized understanding of all d/c and f/u instructions.  All questions were answered at this time.  Copy of paperwork sent with pt.  Medications sent with pt.  [employer] to provide transport.  All personal belongings sent with pt. No concerns at this time.

## 2019-12-13 NOTE — PLAN OF CARE
BP's low 140's. Afebrile.  Reports pain, oxy given.  Denies N/V.  BS active. BM x1, no blood per pt.  Reports voiding ok.  PIV SL  Up independently.  Diet: Low fiber, tolerating.

## 2019-12-13 NOTE — PLAN OF CARE
Full code.  Alert and oriented.  POD #2 from colostomy takedown.  Surgery team changed dressings today.  VSS on room air. Afebrile.  Tolerating a low fiber diet.  Up independently.  Pain managed with scheduled Tylenol, PRN Ibuprofen, and PRN Oxycodone.  Passing gas, Having BMs, bowel sounds active.  Discharge pending.

## 2019-12-13 NOTE — TELEPHONE ENCOUNTER
S/p 1.  Laparoscopic takedown of end colostomy with colorectal anastomosis.   2.  Lysis of adhesions.   3.  Rigid proctoscopy by Dr. Andrae Orozco.   4.  Ureteral stent placement by Urology, Dr. Toni Liao  On 12/10/19.     Surgeon: Dr. Barrios    Patient was discharged home today.  He states that he was told by Dr. Barrios that he should take a stool softener while he is on a low fiber diet.  However, he now realizes that this was not on his list of discharge medications - wondering what the name of medication is and how frequently should he take it.      Discussed with Rosy Rush PA-C.  Patient can take OTC colace as needed.  Hold for loose stools. If he is not having loose stools he can take Q day.  If he is having more difficulty passing stool he can take stool softer BID.  He is to schedule follow up in clinic next week.

## 2019-12-16 ENCOUNTER — OFFICE VISIT (OUTPATIENT)
Dept: SURGERY | Facility: CLINIC | Age: 54
End: 2019-12-16
Payer: COMMERCIAL

## 2019-12-16 VITALS
HEART RATE: 72 BPM | HEIGHT: 67 IN | RESPIRATION RATE: 16 BRPM | SYSTOLIC BLOOD PRESSURE: 148 MMHG | WEIGHT: 165 LBS | DIASTOLIC BLOOD PRESSURE: 104 MMHG | BODY MASS INDEX: 25.9 KG/M2 | OXYGEN SATURATION: 97 %

## 2019-12-16 DIAGNOSIS — K57.92 DIVERTICULITIS: ICD-10-CM

## 2019-12-16 DIAGNOSIS — Z98.890 S/P COLOSTOMY TAKEDOWN: ICD-10-CM

## 2019-12-16 DIAGNOSIS — Z93.3 COLOSTOMY STATUS (H): ICD-10-CM

## 2019-12-16 PROCEDURE — 99024 POSTOP FOLLOW-UP VISIT: CPT | Performed by: SURGERY

## 2019-12-16 RX ORDER — OXYCODONE HYDROCHLORIDE 5 MG/1
5-10 TABLET ORAL EVERY 4 HOURS PRN
Qty: 25 TABLET | Refills: 0 | Status: SHIPPED | OUTPATIENT
Start: 2019-12-16 | End: 2019-12-31

## 2019-12-16 ASSESSMENT — MIFFLIN-ST. JEOR: SCORE: 1542.07

## 2019-12-16 NOTE — PROGRESS NOTES
[facility] Follow Up    Subjective:  Jim is here for his first postoperative visit. He underwent laparoscopic end colostomy takedown with colorectal anastomosis on 12/10/19 with preoperative colonoscopy in 12/9/2019. Today he  tells me he is overall doing pretty well, still having a lot of pain when moving around, mostly at the old ostomy site. Other incisions don't hurt too much.   Is taking about 4-5 oxycodone pills daily, 2 at night and 2 in the morning and typically 1 in early afternoon.  He is eating a low fiber diet and his bowel movements are loose.  Has had about 4-5 soft bowel movements daily since discharge.  No blood.  Reports no fevers and otherwise feeling well.    Objective:  Abd - soft, non-distended  LLQ ostomy site wound 2cm wide and deep, with small serosang drainage. Base is clean small amount of fibrinous exudate at the base.  Inc(s) - c/d/i, healing well, no erythema, +normal healing ridges    Path: FINAL DIAGNOSIS:   Colon, colostomy takedown -   -Reactive epithelial changes with mild inflammation; negative for   malignancy.     Plan:  Activity limitations reviewed - 6 weeks no heavy lifting  1 more week low fiber diet then slowly start back introducing more fiber  At this point stools are soft, doesn't need stool softener, but if stools become more solid can start colace, senna or miralax OTC  25 tabs of 5mg oxycodone prescribed today, start to taper off and hopefully shouldn't need any further prescriptions  RTC in 2-3 weeks for recheck    Jaimee Barrios MD

## 2019-12-16 NOTE — LETTER
2019       Re: Jim Mixon - 1965    Jim is here for his first postoperative visit. He underwent laparoscopic end colostomy takedown with colorectal anastomosis on 12/10/19 with preoperative colonoscopy in 2019. Today he  tells me he is overall doing pretty well, still having a lot of pain when moving around, mostly at the old ostomy site. Other incisions don't hurt too much.   Is taking about 4-5 oxycodone pills daily, 2 at night and 2 in the morning and typically 1 in early afternoon.  He is eating a low fiber diet and his bowel movements are loose.  Has had about 4-5 soft bowel movements daily since discharge.  No blood.  Reports no fevers and otherwise feeling well.     Objective:  Abd - soft, non-distended  LLQ ostomy site wound 2cm wide and deep, with small serosang drainage. Base is clean small amount of fibrinous exudate at the base.  Inc(s) - c/d/i, healing well, no erythema, +normal healing ridges     Path: FINAL DIAGNOSIS:   Colon, colostomy takedown -   -Reactive epithelial changes with mild inflammation; negative for   malignancy.      Plan:  Activity limitations reviewed - 6 weeks no heavy lifting  1 more week low fiber diet then slowly start back introducing more fiber  At this point stools are soft, doesn't need stool softener, but if stools become more solid can start colace, senna or miralax OTC  25 tabs of 5mg oxycodone prescribed today, start to taper off and hopefully shouldn't need any further prescriptions  RTC in 2-3 weeks for recheck     Jaimee Barrios MD

## 2019-12-17 ENCOUNTER — TELEPHONE (OUTPATIENT)
Dept: PEDIATRICS | Facility: CLINIC | Age: 54
End: 2019-12-17

## 2019-12-17 NOTE — DISCHARGE SUMMARY
[facility]    Discharge Summary  Surgery    Date of Admission:  12/10/2019  Date of Discharge:  12/13/2019 12:12 PM  Discharging Provider: Rosy Rush PA-C  Discharge Summary Note completed by: Desiree Flores PA-C on 12/17/2019  Date of Service: The patient was personally seen by Discharging Providers on the day of discharge.    Discharge Diagnoses   Principal Problem:    Colostomy status (H)  Active Problems:    S/P colostomy takedown      Procedure/Surgery Information   Procedure(s):  1.  Laparoscopic takedown of end colostomy with colorectal anastomosis.  2.  Lysis of adhesions.  3.  Rigid proctoscopy by Dr. Andrae Orozco.  4.  Ureteral stent placement by Urology, Dr. Toni Liao  Video cystoscopy, bilateral ureteral catheter placement   Surgeon(s) and Role:  Panel 1:     * Jaimee Barrios MD - Primary     * Andrae Orozco MD - Assisting     * Desiree Flores PA-C - Assisting  Panel 2:     * Toni Liao MD - Primary     Specimens: ID Type Source Tests Collected by Time Destination   A : Colostomy Tissue Colon SURGICAL PATHOLOGY EXAM Jaimee Barrios MD 12/10/2019  4:05 PM       Non-operative procedures: None performed       History of Present Illness   Jim Mixon is a 54 year old male, for whom I did an urgent sigmoid colectomy with end colostomy and Jean Paul pouch for complicated diverticulitis that had failed medical management.  This was done 09/19/2019.  We discussed pursuing takedown at 3 months postop to allow time for adhesions to soften and inflammation to decrease.  The patient requested early takedown, if possible, due to personal factors and need to move himself and his family in less than 2 months.  He had been eating well and gaining weight and overall is a good candidate for takedown.      Hospital Course   Jim Mixon was admitted on 12/10/2019.  The following problems were addressed during his hospitalization:    History of  sigmoid colectomy and end colostomy for complicated diverticulitis.     Gina-operative antibiotic therapy included: Ancef and Invanz.  Post-operative pain control: was via IV until able to tolerate PO intake and transitioned to PO pain meds.    Remarkable hospital course events: normal postop course.  Ureteral stents were placed intraoperatively and were removed upon completion of surgery.  First couple loose BMs were bloody which is normal.     Jim met all criteria for release on 12/13/2019 12:12 PM.  He was afebrile, tolerating diet, pain controlled on PO meds, ambulating well, and had return of bowel function.    Medications discontinued or adjusted during this hospitalization: see discharge med list below.    Antibiotics prescribed at discharge: None prescribed     Imaging study follow up needs:   -No studies require specific follow-up    Discharge Instructions and Follow-Up:  Discharge diet: Low fiber   Discharge activity: Lifting restricted to 20 pounds   Discharge follow-up: Follow up with Dr. Barrios in 1 week for wound check and again in 2-3 weeks for regular postop visit.   Wound/Incision care: Daily dressing changes, pack ostomy site with 2x2 gauze daily.       Desiree Flores PA-C      Discharge Disposition   Discharged to home   Condition at discharge: Good    Pending Results   Final pathology results: Colon, colostomy takedown-Reactive epithelial changes with mild inflammation; negative for malignancy.    Unresulted Labs Ordered in the Past 30 Days of this Admission     No orders found from 11/10/2019 to 12/11/2019.          Primary Care Physician   [facility]    Consultations This Hospital Stay   NUTRITION SERVICES ADULT IP CONSULT  CARE COORDINATOR IP CONSULT    Discharge Orders   No discharge procedures on file.  Discharge Medications   Discharge Medication List as of 12/13/2019 10:34 AM      START taking these medications    Details   oxyCODONE (ROXICODONE) 5 MG tablet Take  1-2 tablets (5-10 mg) by mouth every 4 hours as needed for moderate to severe pain, Disp-20 tablet, R-0, E-Prescribe         CONTINUE these medications which have NOT CHANGED    Details   acetaminophen (TYLENOL) 500 MG tablet Take 1,000 mg by mouth every 6 hours as needed for mild pain, Historical      esomeprazole (NEXIUM) 20 MG DR capsule Take 20 mg by mouth every morning (before breakfast) Take 30-60 minutes before eating., Historical      LORazepam (ATIVAN) 1 MG tablet Take 1 mg by mouth every 6 hours as needed for anxiety, Historical      ondansetron (ZOFRAN-ODT) 4 MG ODT tab Take 1 tablet (4 mg) by mouth every 8 hours as needed for nausea, Disp-5 tablet, R-0, E-Prescribe         STOP taking these medications       magnesium citrate solution Comments:   Reason for Stopping:         metroNIDAZOLE (FLAGYL) 500 MG tablet Comments:   Reason for Stopping:         neomycin (MYCIFRADIN) 500 MG tablet Comments:   Reason for Stopping:             Allergies   Allergies   Allergen Reactions     Ciprofloxacin Itching     Reddness,      Morphine Hives and Itching     Histamine reaction     Data   Most Recent 3 CBC's:  Recent Labs   Lab Test 12/13/19  0753 12/12/19  0735 12/11/19  0603   WBC 6.4 7.3 10.7   HGB 12.4* 12.2* 12.8*   MCV 89 91 90    223 273      Most Recent 3 BMP's:  Recent Labs   Lab Test 12/13/19  0753 12/12/19  0735 12/11/19  0603    140 140   POTASSIUM 3.8 4.3 4.4   CHLORIDE 108 109 109   CO2 26 28 25   BUN 14 11 13   CR 0.99 1.09 1.05   ANIONGAP 6 3 6   JOSEFINA 8.7 8.6 8.5   * 102* 130*     Most Recent 2 LFT's:  Recent Labs   Lab Test 11/06/19  1447 08/19/19  0756   AST 16 28   ALT 40 52   ALKPHOS 102 90   BILITOTAL 0.9 0.8     Most Recent INR's and Anticoagulation Dosing History:  Anticoagulation Dose History     Recent Dosing and Labs Latest Ref Rng & Units 9/4/2019    INR 0.86 - 1.14 1.06        Most Recent 3 Troponin's:  Recent Labs   Lab Test 11/06/19  1447   TROPI <0.015     Most  Recent Cholesterol Panel:No lab results found.  Most Recent 6 Bacteria Isolates From Any Culture (See EPIC Reports for Culture Details):  Recent Labs   Lab Test 09/05/19  1610   CULT Heavy growth  Escherichia coli  *  Moderate growth  Enterococcus faecalis  *  Moderate growth  Candida albicans / dubliniensis  Candida albicans and Candida dubliniensis are not routinely speciated  Susceptibility testing not routinely done  *  Light growth  Mixed gram positive william    Moderate growth  Fusobacterium species  Susceptibility testing not routinely done  *  Heavy growth  Bacteroides thetaiotaomicron  Susceptibility testing not routinely done  *  Heavy growth  Mixed aerobic and anaerobic william  *     Most Recent TSH, T4 and A1c Labs:No lab results found.  Results for orders placed or performed during the hospital encounter of 11/06/19   CT Abdomen Pelvis w Contrast    Narrative    EXAM: CT ABDOMEN PELVIS W CONTRAST  LOCATION: [facility]  DATE/TIME: 11/6/2019 6:27 PM    INDICATION: Abdominal pain status post partial colectomy.  COMPARISON: 09/17/2019.  TECHNIQUE: CT scan of the abdomen and pelvis was performed following injection of IV contrast. Multiplanar reformats were obtained. Dose reduction techniques were used.  CONTRAST: 75 mL Isovue-370.    FINDINGS:   LOWER CHEST: Normal.    HEPATOBILIARY: Cholecystectomy. Fatty liver. There is a small amount of portal venous air now seen in the left hepatic lobe. This most likely is postsurgical although clinical correlation recommended as it can be a finding of bowel ischemia. In addition,   there is some air within venous radicles within the left upper abdomen.    PANCREAS: Normal.    SPLEEN: Normal.    ADRENAL GLANDS: Normal.    KIDNEYS/BLADDER: Small cyst right kidney.    BOWEL: Interval sigmoid resection with new descending colostomy. No abscess.    LYMPH NODES: Normal.    VASCULATURE: Unremarkable.    PELVIC ORGANS: Normal.    OTHER:  None.    MUSCULOSKELETAL: Normal.      Impression    IMPRESSION:   1.  Interval sigmoid resection with new descending colostomy. No evidence for abscess or bowel obstruction. There is new very mild portal venous air in the left hepatic lobe with some air seen within mesenteric venous radicles in the left side of the   abdomen. This area is likely postsurgical in nature although clinical correlation recommended as this can be a sign of bowel infarct. No pneumatosis or bowel wall thickening. There is soft tissue haziness and stranding within the subcutaneous tissues at   the stoma but no discrete fluid collection.    2.  Fatty liver.    3.  Cholecystectomy.    4.  Small cyst right kidney.

## 2019-12-17 NOTE — TELEPHONE ENCOUNTER
Reason for call:  Other   Patient called regarding (reason for call): appointment-Post op   Additional comments: Requesting post op appointment for next week, any day is fine.     Phone number to reach patient:  Home number on file [phone] (home)    Best Time:  any    Can we leave a detailed message on this number?  YES     Zandra Johnson on 12/17/2019 at 4:58 PM

## 2019-12-18 NOTE — TELEPHONE ENCOUNTER
TC: Please offer an appointment with  on 12/26(Thurs) at 8:10 am. Thanks.    Otf RN  Triage Nurse

## 2019-12-18 NOTE — TELEPHONE ENCOUNTER
Outgoing call to patient, unable to leave VM no voice setup.    Thanks  Zack BOOGIE  Team Coodinator

## 2019-12-20 NOTE — TELEPHONE ENCOUNTER
MA/TRISTON    Called pt. He would like to take the 12/26 8:10 appt. Can you please add him to the schedule with ?   Bryanna Wolfe RN on 12/20/2019 at 4:02 PM

## 2019-12-31 ENCOUNTER — OFFICE VISIT (OUTPATIENT)
Dept: SURGERY | Facility: CLINIC | Age: 54
End: 2019-12-31
Payer: COMMERCIAL

## 2019-12-31 VITALS
SYSTOLIC BLOOD PRESSURE: 130 MMHG | OXYGEN SATURATION: 96 % | HEIGHT: 67 IN | WEIGHT: 165 LBS | RESPIRATION RATE: 16 BRPM | HEART RATE: 77 BPM | DIASTOLIC BLOOD PRESSURE: 90 MMHG | BODY MASS INDEX: 25.9 KG/M2

## 2019-12-31 DIAGNOSIS — Z09 SURGICAL FOLLOWUP VISIT: Primary | ICD-10-CM

## 2019-12-31 PROCEDURE — 99024 POSTOP FOLLOW-UP VISIT: CPT | Performed by: SURGERY

## 2019-12-31 ASSESSMENT — MIFFLIN-ST. JEOR: SCORE: 1542.07

## 2019-12-31 NOTE — PROGRESS NOTES
[facility] Follow Up     Subjective:  Jim is here for his second postoperative visit. He underwent laparoscopic end colostomy takedown with colorectal anastomosis on 12/10/19 with preoperative colonoscopy in 12/9/2019. discharged on POD#3. Today he tells me he is well, minimal pain. Mild pain at old ostomy site with moving around. First postop visit 1 week postop he was doing well but still having pain and received refill oxycodone prescription.  He is eating a low fiber diet and his bowel movements are more formed, smaller in diameter than prior to surgery.  Has had about 3-6 soft bowel movements daily. Using colace. No blood.  Not sleeping well, uses lorazepam     Objective:  Abd - soft, non-distended  LLQ ostomy site wound <1cm long, with small serosang drainage. Base with scab, removed. Suture end through medial portion, removed. Silver nitrate used on the base of the wound to assist with debridement.  Inc(s) - c/d/i, healing well, no erythema     Path: FINAL DIAGNOSIS:   Colon, colostomy takedown -   -Reactive epithelial changes with mild inflammation; negative for   malignancy.      Plan:  Activity limitations reviewed - 6 weeks no heavy lifting  Cover wound until no drainage, suspect less than 1 week more, as silver nitrate used today expect more dark/black drainage or scabbing.  Start back introducing more fiber in diet  At this point stools are soft with colace, if stools become more solid or constipation problems can start metamucil, senna or miralax OTC  RTC prn  Discussed risk of anastomotic stenosis - had to use 25mm EEA (28mm too large and created serosal tear) and what to watch for, pt is moving to [place] in about 1 month  Colonoscopy was negative for polyps, next colonoscopy in 10 years     Jaimee Barrios MD

## 2019-12-31 NOTE — LETTER
2019    RE: Jim mathews, : 1965      [facility] Follow [facility]     Subjective:  Jim is here for his second postoperative visit. He underwent laparoscopic end colostomy takedown with colorectal anastomosis on 12/10/19 with preoperative colonoscopy in 2019. discharged on POD#3. Today he tells me he is well, minimal pain. Mild pain at old ostomy site with moving around. First postop visit 1 week postop he was doing well but still having pain and received refill oxycodone prescription.  He is eating a low fiber diet and his bowel movements are more formed, smaller in diameter than prior to surgery.  Has had about 3-6 soft bowel movements daily. Using colace. No blood.  Not sleeping well, uses lorazepam     Objective:  Abd - soft, non-distended  LLQ ostomy site wound <1cm long, with small serosang drainage. Base with scab, removed. Suture end through medial portion, removed. Silver nitrate used on the base of the wound to assist with debridement.  Inc(s) - c/d/i, healing well, no erythema     Path: FINAL DIAGNOSIS:   Colon, colostomy takedown -   -Reactive epithelial changes with mild inflammation; negative for   malignancy.      Plan:  Activity limitations reviewed - 6 weeks no heavy lifting  Cover wound until no drainage, suspect less than 1 week more, as silver nitrate used today expect more dark/black drainage or scabbing.  Start back introducing more fiber in diet  At this point stools are soft with colace, if stools become more solid or constipation problems can start metamucil, senna or miralax OTC  RTC prn  Discussed risk of anastomotic stenosis - had to use 25mm EEA (28mm too large and created serosal tear) and what to watch for, pt is moving to [place] in about 1 month  Colonoscopy was negative for polyps, next colonoscopy in 10 years     Jaimee Barrios MD

## 2019-12-31 NOTE — PATIENT INSTRUCTIONS
Jim to follow up with Primary Care provider regarding elevated blood pressure.    Instructions:  OK to start regular food, normal fiber diet is okay  Can continue taking colace (docusate) for constipation or if worsening constipation, can also use metamucil, or other laxatives such as miralax or senna (over the counter)  Silver nitrate used on the wound - will look black over the few days. Once no more drainage, can stop covering the wound  Shower or bath is okay  6 weeks total no heavy lifting (less than 20 pounds is okay)  Discussed anastomotic stenosis (if stools are very narrow or difficult to pass)  Next colonoscopy in 10 years

## 2020-01-02 ENCOUNTER — OFFICE VISIT (OUTPATIENT)
Dept: PEDIATRICS | Facility: CLINIC | Age: 55
End: 2020-01-02
Payer: COMMERCIAL

## 2020-01-02 VITALS
HEIGHT: 67 IN | BODY MASS INDEX: 25.9 KG/M2 | SYSTOLIC BLOOD PRESSURE: 138 MMHG | TEMPERATURE: 97.5 F | RESPIRATION RATE: 14 BRPM | WEIGHT: 165 LBS | DIASTOLIC BLOOD PRESSURE: 80 MMHG | HEART RATE: 88 BPM

## 2020-01-02 DIAGNOSIS — Z98.890 S/P COLOSTOMY TAKEDOWN: ICD-10-CM

## 2020-01-02 DIAGNOSIS — G47.01 INSOMNIA DUE TO MEDICAL CONDITION: Primary | ICD-10-CM

## 2020-01-02 PROCEDURE — 99213 OFFICE O/P EST LOW 20 MIN: CPT | Mod: GE | Performed by: STUDENT IN AN ORGANIZED HEALTH CARE EDUCATION/TRAINING PROGRAM

## 2020-01-02 RX ORDER — TRAZODONE HYDROCHLORIDE 100 MG/1
100 TABLET ORAL AT BEDTIME
Qty: 90 TABLET | Refills: 0 | Status: SHIPPED | OUTPATIENT
Start: 2020-01-02

## 2020-01-02 ASSESSMENT — MIFFLIN-ST. JEOR: SCORE: 1542.07

## 2020-01-02 NOTE — PROGRESS NOTES
Subjective     Jim Mixon is a 54 year old male who presents to clinic today for the following health issues:    History of Present Illness        He eats 0-1 servings of fruits and vegetables daily.He consumes 2 sweetened beverage(s) daily.  He is taking medications regularly.     Postop      Duration:  Three wks ago    Description (location/character/radiation): Pt has colostomy    Intensity:  moderate    Accompanying signs and symptoms: tenderness in area, but having a hard pain sleeping    History (similar episodes/previous evaluation): abdominal pain and resection of colon    Precipitating or alleviating factors: None    Therapies tried and outcome: Surgery effective     Jim is a 53 yo M w/ hx of diverticulitis c/b perforation s/p sigmoid colectomy and colostomy formation who presents to clinic for post-op check up. Three weeks ago, had his colostomy taken down and colorectal anastomosis. Since discharge from hospital, has followed at surgery clinic twice already. Pain is resolved (was on oxycodone for 2 weeks but has not taken oxycodone for about a week). Only using tylenol and ibuprofen as needed. Wound is healing and amount of drainage is improving as well. At last surgery clinic visit, food limitation was lifted and now slowly going back to his usual diet. BM is still intermittently thin caliber but slowly improving. Still taking colace.     His main concern today is insomnia, which started after surgery. He thinks this is due to lots of stress from surgery and upcoming move to [place]. He has been prescribed lorazepam from hospital which he has been taking only at night and never with oxycodone. He would like to get something for his sleeping problems. No fever, nausea, vomiting, cough, SOB, hematochezia, melena.    Patient Active Problem List   Diagnosis     Diverticulitis     Yeast infection     Escherichia coli (E. coli) infection     Intra-abdominal abscess (H)     Diverticulitis of  large intestine with perforation     Diverticulitis large intestine     Colostomy status (H)     S/P colon resection     Gastroesophageal reflux disease     S/P colostomy takedown     Past Surgical History:   Procedure Laterality Date     CHOLECYSTECTOMY  01/2004     COLONOSCOPY N/A 12/9/2019    Procedure: COLONOSCOPY;  Surgeon: Jaimee Barrios MD;  Location: [facility] OR     COLOSTOMY       ENDOSCOPY       INSERT STENT URETER Bilateral 9/19/2019    Procedure: PREOPERATIVE BILATERAL URETERAL STENT PLACEMENT;  Surgeon: Toni Liao MD;  Location: [facility] OR     INSERT STENT URETER Bilateral 12/10/2019    Procedure: Video cystoscopy, bilateral ureteral catheter placement;  Surgeon: Toni Liao MD;  Location: [facility] OR     LAPAROSCOPIC ASSISTED COLOSTOMY TAKEDOWN N/A 12/10/2019    Procedure: 1.  Laparoscopic takedown of end colostomy with colorectal anastomosis.  2.  Lysis of adhesions.  3.  Rigid proctoscopy by Dr. Andrae Orozco.  4.  Ureteral stent placement by Urology, Dr. Toni Liao;  Surgeon: Jaimee Barrios MD;  Location: [facility] OR     LAPAROSCOPIC ASSISTED SIGMOID COLECTOMY N/A 9/19/2019    Procedure: LAPAROSCOPY-ASSISTED, SIGMOIDCOLECTOMY WITH END COLOSTOMY;  Surgeon: Jaimee Barrios MD;  Location: [facility] OR       Social History     Tobacco Use     Smoking status: Never Smoker     Smokeless tobacco: Never Used   Substance Use Topics     Alcohol use: Not Currently     Family History   Problem Relation Age of Onset     Diabetes Father      Diabetes Maternal Grandmother          Current Outpatient Medications   Medication Sig Dispense Refill     acetaminophen (TYLENOL) 500 MG tablet Take 1,000 mg by mouth every 6 hours as needed for mild pain       esomeprazole (NEXIUM) 20 MG DR capsule Take 20 mg by mouth every morning (before breakfast) Take 30-60 minutes before eating.       Multiple Vitamins-Minerals (WOMENS ONE DAILY PO)        traZODone (DESYREL) 100 MG tablet Take 1 tablet (100 mg) by mouth At Bedtime 90  "tablet 0     Allergies   Allergen Reactions     Ciprofloxacin Itching     Reddness,      Morphine Hives and Itching     Histamine reaction     Reviewed and updated as needed this visit by Provider  Tobacco  Allergies  Meds  Problems  Med Hx  Surg Hx  Fam Hx         Review of Systems   - please refer to HPI for ROS      Objective    /80   Pulse 88   Temp 97.5  F (36.4  C) (Oral)   Resp 14   Ht 1.702 m (5' 7\")   Wt 74.8 kg (165 lb)   BMI 25.84 kg/m    Body mass index is 25.84 kg/m .     Physical Exam   GENERAL: healthy, alert and no distress  EYES: Eyes grossly normal to inspection, conjunctivae and sclerae normal  RESP: lungs clear to auscultation - no rales, rhonchi or wheezes  CV: regular rate and rhythm, normal S1 S2, no S3 or S4, no murmur, click or rub, peripheral pulses strong  ABDOMEN: soft, previous colostomy area has a healing wound covered with dressing, mild discharge noted but no bleeding, +BS  MS: no gross musculoskeletal defects noted  SKIN: Skin surrounding the wound on abdomen appears not warm, and non-tender  NEURO: Normal strength and tone, mentation intact and speech normal  PSYCH: mentation appears normal, affect normal/bright          Diagnostic Test Results:  none         Assessment & Plan   1. Insomnia due to medical condition  His previous regimen of lorazepam is not the first line option for insomnia. Especially, it seemed to be a bad choice in the setting of oxycodone use. Discussed with patient about the side effects of lorazepam and recommend trazodone. He has tried this before so discussed about going up to a higher dose. Also, combination with melatonin would be helpful as well. Also, informed patient that as he slowly recovers from surgery, inflammation should continue to improve, which might help with insomnia as well  - traZODone (DESYREL) 100 MG tablet; Take 1 tablet (100 mg) by mouth At Bedtime  Dispense: 90 tablet; Refill: 0  - melatonin 3-6 mg at bedtime    2. " "S/p colostomy takedown  - continue wound care as instructed by surgeon  - tylenol and ibuprofen as needed for pain    BMI:   Estimated body mass index is 25.84 kg/m  as calculated from the following:    Height as of this encounter: 1.702 m (5' 7\").    Weight as of this encounter: 74.8 kg (165 lb).     FUTURE APPOINTMENTS:  Return in about 2 months (around 3/2/2020) for establishment of care in [place].    Patient discussed with Dr. Zhen Alicea MD  [facility]        I have discussed the patient's presenting complaint(s) with Dr. Alicea and agree with the history, physical exam and plan as documented above. His progress note reflects our joint assessment and plan.    Anuj Fontaine M.D.  Internal Medicine-Pediatrics         "

## 2020-01-02 NOTE — PATIENT INSTRUCTIONS
- will start trazodone 100 mg at bedtime; also get melatonin over the counter 6 mg at night as well  - continue with tylenol and ibuprofen as needed  - continue with wound care as instructed by surgeon

## 2020-01-16 ENCOUNTER — TELEPHONE (OUTPATIENT)
Dept: SURGERY | Facility: CLINIC | Age: 55
End: 2020-01-16

## 2020-01-16 NOTE — TELEPHONE ENCOUNTER
S/p 1.  Laparoscopic takedown of end colostomy with colorectal anastomosis.   2.  Lysis of adhesions.   3.  Rigid proctoscopy by Dr. Andrae Orozco.   4.  Ureteral stent placement by Urology, Dr. Toni Liao  12/10/19    Surgeon:  Dr. Barrios  Patient was last seen in office for post-op visit on 12/31/19.  Today he reports that he noticed some bright red blood when he wiped after having a bowel movement.  Also small amount of blood on the outside of stool - not enough to color water.  He denies any abdominal pain or pain with bowel movement.  States that last week he was somewhat constipated and added Metamucil which softened his stool.    Reports today's bowel movement was soft.  When I questioned pt, he informed me that when he had a colonoscopy in [place] he was told that he had some internal hemorrhoids.      Patient is rather anxious about this. States that he is moving out of [place] in a few weeks and wants to be sure that this is not a complication from his surgery.    I will review the above info with clinic PA and inform pt of their assessment and any recommendations.

## 2020-01-16 NOTE — TELEPHONE ENCOUNTER
Discussed with Desiree Flores PA-C.  Blood is likely due to internal hemorrhoid.  Continue with daily metamucil and stool softener along with plenty of fluids to keep stool soft.    He will monitor and if blood continues or increasing or if new/worrisome symptoms he will call clinic.      Patient verbalizes understanding of above and agrees with plan.

## 2025-03-18 ENCOUNTER — OFFICE VISIT (OUTPATIENT)
Dept: URBAN - METROPOLITAN AREA CLINIC 50 | Facility: CLINIC | Age: 60
End: 2025-03-18

## (undated) DEVICE — DRSG GAUZE 4X4" 8044

## (undated) DEVICE — ENDO TROCAR SLEEVE KII ADV FIXATION 05X100MM CFS02

## (undated) DEVICE — ESU PENCIL W/HOLSTER E2350H

## (undated) DEVICE — PREP POVIDONE IODINE SOLUTION 10% 4OZ

## (undated) DEVICE — SU VICRYL 2-0 SH 27" J317H

## (undated) DEVICE — PAD CHUX UNDERPAD 30X36" P3036C

## (undated) DEVICE — SU PROLENE 2-0 SHDA 48" 8533H

## (undated) DEVICE — PACK CYSTO CUSTOM RIDGES

## (undated) DEVICE — LUBRICATING JELLY 2.7GM T00137

## (undated) DEVICE — LINEN FULL SHEET 5511

## (undated) DEVICE — ESU ELEC BLADE 2.75" COATED/INSULATED E1455

## (undated) DEVICE — NDL 22GA 1.5"

## (undated) DEVICE — POLYURETHANE URETERAL CATHETER

## (undated) DEVICE — DRAPE MAYO STAND 23X54 8337

## (undated) DEVICE — SU VICRYL 0 UR-6 27" J603H

## (undated) DEVICE — GOWN IMPERVIOUS ZONED XLG 9041

## (undated) DEVICE — BAG CLEAR TRASH 1.3M 39X33" P4040C

## (undated) DEVICE — TUBING SUCTION 12"X1/4" N612

## (undated) DEVICE — TUBING IRRIG CYSTO/BLADDER SET 81" LF 2C4040

## (undated) DEVICE — KIT PROCEDURE W/CLEAN-A-SCOPE LINERS V2 200800

## (undated) DEVICE — CATH TRAY FOLEY SURESTEP 16FR DRAIN BAG STATOCK A899916

## (undated) DEVICE — LINEN POUCH DBL 5427

## (undated) DEVICE — DRSG TEGADERM 4X4 3/4" 1626W

## (undated) DEVICE — ENDO TROCAR FIRST ENTRY KII FIOS Z-THRD 05X100MM CTF03

## (undated) DEVICE — ENDO TROCAR BLUNT TIP KII BALLOON 12X100MM C0R47

## (undated) DEVICE — SYSTEM CLEARIFY VISUALIZATION 21-345

## (undated) DEVICE — LINEN ORTHO ACL PACK 5447

## (undated) DEVICE — GLOVE PROTEXIS POWDER FREE SMT 7.5  2D72PT75X

## (undated) DEVICE — STPL LINEAR CUT 75MM TLC75

## (undated) DEVICE — LINEN DRAPE 54X72" 5467

## (undated) DEVICE — PREP SKIN SCRUB TRAY 4461A

## (undated) DEVICE — PACK SET-UP STD 9102

## (undated) DEVICE — STPL CONTOUR CUT CVD BLUE CS40B

## (undated) DEVICE — SU VICRYL 3-0 SH 27" J316H

## (undated) DEVICE — LINEN TOWEL PACK X5 5464

## (undated) DEVICE — GLOVE PROTEXIS BLUE W/NEU-THERA 8.0  2D73EB80

## (undated) DEVICE — KIT SIGMOIDOSCOPE W/OBTURATOR 18FR SUCTION KI521/10

## (undated) DEVICE — STPL SINGLE USE 28MM W/3.5MM EEA2835

## (undated) DEVICE — SUCTION IRR STRYKERFLOW II W/TIP 250-070-520

## (undated) DEVICE — SU VICRYL 2-0 TIE 12X18" J905T

## (undated) DEVICE — DRSG STERI STRIP 1/2X4" R1547

## (undated) DEVICE — SU VICRYL 4-0 PS-2 18" UND J496H

## (undated) DEVICE — ESU CORD MONOPOLAR 10'  E0510

## (undated) DEVICE — ENDO TROCAR SLEEVE KII Z-THREADED 05X100MM CTS02

## (undated) DEVICE — LINEN HALF SHEET 5512

## (undated) DEVICE — DRAPE LAVH/LAPAROSCOPY W/POUCH 29474

## (undated) DEVICE — SU PROLENE 0 CT 30" 8434H

## (undated) DEVICE — BLADE KNIFE SURG 11 371111

## (undated) DEVICE — PREP TECHNI-CARE CHLOROXYLENOL 3% 4OZ BOTTLE C222-4ZWO

## (undated) DEVICE — NDL BLUNT 18GA 1" W/O FILTER 305181

## (undated) DEVICE — BLADE CLIPPER 3M 9670

## (undated) DEVICE — SUCTION TIP YANKAUER W/O VENT K86

## (undated) DEVICE — GOWN XLG DISP 9545

## (undated) DEVICE — SOL NACL 0.9% IRRIG 3000ML BAG 2B7477

## (undated) DEVICE — ESU LIGASURE MARYLAND LAPAROSCOPIC SLR/DVDR 5MMX37CM LF1937

## (undated) DEVICE — SUCTION TIP POOLE K770

## (undated) DEVICE — GLOVE PROTEXIS BLUE W/NEU-THERA 7.0  2D73EB70

## (undated) DEVICE — SOL WATER IRRIG 1000ML BOTTLE 2F7114

## (undated) DEVICE — GLOVE PROTEXIS MICRO 6.5  2D73PM65

## (undated) DEVICE — SU PDS II 0 CT 36" Z358T

## (undated) DEVICE — SPONGE KITTNER 30-101

## (undated) DEVICE — SU ETHILON 2-0 PS 18" 585H

## (undated) DEVICE — SU SILK 2-0 TIE 24" SA75H

## (undated) DEVICE — BARRIER SEPRAFILM 3X5" X6 SHEETS 5086-02

## (undated) DEVICE — ESU GROUND PAD ADULT W/CORD E7507

## (undated) DEVICE — SU VICRYL 3-0 SH CR 8X18" J774

## (undated) DEVICE — Device

## (undated) DEVICE — IMPLANTABLE DEVICE
Type: IMPLANTABLE DEVICE | Site: URETER | Status: NON-FUNCTIONAL
Removed: 2019-12-10

## (undated) DEVICE — BAG RED BIOHAZARD 37X50" 40GAL A7450PR

## (undated) DEVICE — SU VICRYL 3-0 SH 27" UND J416H

## (undated) DEVICE — SUCTION CANISTER MEDIVAC LINER 3000ML W/LID 65651-530

## (undated) DEVICE — GUIDEWIRE URO STR STIFF .035"X150CM NITINOL 150NSS35

## (undated) DEVICE — DRAPE LEGGINGS 8421

## (undated) DEVICE — EVAC SYSTEM CLEAR FLOW SC082500

## (undated) DEVICE — CATH TRAY FOLEY 18FR DRAINAGE BAG STATLOCK 899918

## (undated) DEVICE — GLOVE PROTEXIS POWDER FREE 7.5 ORTHOPEDIC 2D73ET75

## (undated) DEVICE — TUBING SUCTION MEDI-VAC SOFT 3/16"X20' N520A

## (undated) DEVICE — ENDO GELPORT 100/120MM C8XX2

## (undated) DEVICE — CATH FOLEY COUDE 18FR 5ML LATEX

## (undated) DEVICE — SOL WATER IRRIG 3000ML BAG 2B7117

## (undated) DEVICE — LINEN TOWEL PACK X10 5473

## (undated) DEVICE — PREP CHLORAPREP 26ML TINTED ORANGE  260815

## (undated) DEVICE — ENDO TROCAR FIRST ENTRY KII FIOS Z-THRD 12X100MM CTF73

## (undated) DEVICE — CATH URETERAL WHISTLE 5FR 115CM 136405

## (undated) DEVICE — SU SILK 3-0 SH 30" K832H

## (undated) DEVICE — SPONGE LAP 18X18" X8435

## (undated) DEVICE — SOL NACL 0.9% IRRIG 1000ML BOTTLE 2F7124

## (undated) DEVICE — PROTECTOR ARM ONE-STEP TRENDELENBURG 40418

## (undated) DEVICE — DRAPE GYN/UROLOGY FLUID POUCH TUR 29455

## (undated) DEVICE — KIT PATIENT POSITIONING PIGAZZI LATEX FREE 40580

## (undated) DEVICE — BLADE KNIFE SURG 10 371110

## (undated) RX ORDER — NEOSTIGMINE METHYLSULFATE 1 MG/ML
VIAL (ML) INJECTION
Status: DISPENSED
Start: 2019-12-10

## (undated) RX ORDER — HEPARIN SODIUM 5000 [USP'U]/.5ML
INJECTION, SOLUTION INTRAVENOUS; SUBCUTANEOUS
Status: DISPENSED
Start: 2019-12-10

## (undated) RX ORDER — NEOSTIGMINE METHYLSULFATE 1 MG/ML
VIAL (ML) INJECTION
Status: DISPENSED
Start: 2019-09-19

## (undated) RX ORDER — ALBUMIN, HUMAN INJ 5% 5 %
SOLUTION INTRAVENOUS
Status: DISPENSED
Start: 2019-12-10

## (undated) RX ORDER — ACETAMINOPHEN 325 MG/1
TABLET ORAL
Status: DISPENSED
Start: 2019-12-10

## (undated) RX ORDER — FENTANYL CITRATE 50 UG/ML
INJECTION, SOLUTION INTRAMUSCULAR; INTRAVENOUS
Status: DISPENSED
Start: 2019-09-19

## (undated) RX ORDER — BUPIVACAINE HYDROCHLORIDE 5 MG/ML
INJECTION, SOLUTION EPIDURAL; INTRACAUDAL
Status: DISPENSED
Start: 2019-12-10

## (undated) RX ORDER — ERTAPENEM 1 G/1
INJECTION, POWDER, LYOPHILIZED, FOR SOLUTION INTRAMUSCULAR; INTRAVENOUS
Status: DISPENSED
Start: 2019-12-10

## (undated) RX ORDER — ONDANSETRON 2 MG/ML
INJECTION INTRAMUSCULAR; INTRAVENOUS
Status: DISPENSED
Start: 2019-12-10

## (undated) RX ORDER — LIDOCAINE HYDROCHLORIDE 10 MG/ML
INJECTION, SOLUTION EPIDURAL; INFILTRATION; INTRACAUDAL; PERINEURAL
Status: DISPENSED
Start: 2019-12-10

## (undated) RX ORDER — PROPOFOL 10 MG/ML
INJECTION, EMULSION INTRAVENOUS
Status: DISPENSED
Start: 2019-12-10

## (undated) RX ORDER — FENTANYL CITRATE 50 UG/ML
INJECTION, SOLUTION INTRAMUSCULAR; INTRAVENOUS
Status: DISPENSED
Start: 2019-12-10

## (undated) RX ORDER — CEFAZOLIN SODIUM 2 G/100ML
INJECTION, SOLUTION INTRAVENOUS
Status: DISPENSED
Start: 2019-12-09

## (undated) RX ORDER — PROPOFOL 10 MG/ML
INJECTION, EMULSION INTRAVENOUS
Status: DISPENSED
Start: 2019-12-09

## (undated) RX ORDER — GLYCOPYRROLATE 0.2 MG/ML
INJECTION INTRAMUSCULAR; INTRAVENOUS
Status: DISPENSED
Start: 2019-09-19

## (undated) RX ORDER — PHENYLEPHRINE HCL IN 0.9% NACL 1 MG/10 ML
SYRINGE (ML) INTRAVENOUS
Status: DISPENSED
Start: 2019-12-10

## (undated) RX ORDER — GLYCOPYRROLATE 0.2 MG/ML
INJECTION INTRAMUSCULAR; INTRAVENOUS
Status: DISPENSED
Start: 2019-12-10

## (undated) RX ORDER — KETAMINE HCL IN 0.9 % NACL 50 MG/5 ML
SYRINGE (ML) INTRAVENOUS
Status: DISPENSED
Start: 2019-09-19

## (undated) RX ORDER — BUPIVACAINE HYDROCHLORIDE AND EPINEPHRINE 2.5; 5 MG/ML; UG/ML
INJECTION, SOLUTION EPIDURAL; INFILTRATION; INTRACAUDAL; PERINEURAL
Status: DISPENSED
Start: 2019-12-10

## (undated) RX ORDER — LABETALOL 20 MG/4 ML (5 MG/ML) INTRAVENOUS SYRINGE
Status: DISPENSED
Start: 2019-12-10

## (undated) RX ORDER — HYDROMORPHONE HYDROCHLORIDE 1 MG/ML
INJECTION, SOLUTION INTRAMUSCULAR; INTRAVENOUS; SUBCUTANEOUS
Status: DISPENSED
Start: 2019-09-19

## (undated) RX ORDER — KETOROLAC TROMETHAMINE 30 MG/ML
INJECTION, SOLUTION INTRAMUSCULAR; INTRAVENOUS
Status: DISPENSED
Start: 2019-12-10

## (undated) RX ORDER — KETAMINE HCL IN 0.9 % NACL 50 MG/5 ML
SYRINGE (ML) INTRAVENOUS
Status: DISPENSED
Start: 2019-12-10

## (undated) RX ORDER — BUPIVACAINE HYDROCHLORIDE AND EPINEPHRINE 2.5; 5 MG/ML; UG/ML
INJECTION, SOLUTION EPIDURAL; INFILTRATION; INTRACAUDAL; PERINEURAL
Status: DISPENSED
Start: 2019-09-19

## (undated) RX ORDER — ONDANSETRON 2 MG/ML
INJECTION INTRAMUSCULAR; INTRAVENOUS
Status: DISPENSED
Start: 2019-09-19

## (undated) RX ORDER — LIDOCAINE HYDROCHLORIDE 10 MG/ML
INJECTION, SOLUTION EPIDURAL; INFILTRATION; INTRACAUDAL; PERINEURAL
Status: DISPENSED
Start: 2019-12-09

## (undated) RX ORDER — ONDANSETRON 2 MG/ML
INJECTION INTRAMUSCULAR; INTRAVENOUS
Status: DISPENSED
Start: 2019-12-09

## (undated) RX ORDER — DEXAMETHASONE SODIUM PHOSPHATE 4 MG/ML
INJECTION, SOLUTION INTRA-ARTICULAR; INTRALESIONAL; INTRAMUSCULAR; INTRAVENOUS; SOFT TISSUE
Status: DISPENSED
Start: 2019-12-10

## (undated) RX ORDER — LIDOCAINE HYDROCHLORIDE 10 MG/ML
INJECTION, SOLUTION EPIDURAL; INFILTRATION; INTRACAUDAL; PERINEURAL
Status: DISPENSED
Start: 2019-09-19

## (undated) RX ORDER — HYDROMORPHONE HYDROCHLORIDE 1 MG/ML
INJECTION, SOLUTION INTRAMUSCULAR; INTRAVENOUS; SUBCUTANEOUS
Status: DISPENSED
Start: 2019-12-10